# Patient Record
Sex: FEMALE | Race: BLACK OR AFRICAN AMERICAN | NOT HISPANIC OR LATINO | Employment: FULL TIME | URBAN - METROPOLITAN AREA
[De-identification: names, ages, dates, MRNs, and addresses within clinical notes are randomized per-mention and may not be internally consistent; named-entity substitution may affect disease eponyms.]

---

## 2018-06-09 ENCOUNTER — HOSPITAL ENCOUNTER (EMERGENCY)
Facility: OTHER | Age: 28
Discharge: HOME | End: 2018-06-09
Attending: EMERGENCY MEDICINE
Payer: MEDICAID

## 2018-06-09 VITALS
OXYGEN SATURATION: 98 % | HEIGHT: 68 IN | HEART RATE: 58 BPM | WEIGHT: 178 LBS | DIASTOLIC BLOOD PRESSURE: 75 MMHG | BODY MASS INDEX: 26.98 KG/M2 | TEMPERATURE: 98 F | SYSTOLIC BLOOD PRESSURE: 118 MMHG | RESPIRATION RATE: 18 BRPM

## 2018-06-09 DIAGNOSIS — T78.40XA ALLERGIC REACTION, INITIAL ENCOUNTER: Primary | ICD-10-CM

## 2018-06-09 LAB
B-HCG UR QL: NEGATIVE
CTP QC/QA: YES

## 2018-06-09 PROCEDURE — 63600175 PHARM REV CODE 636 W HCPCS: Performed by: PHYSICIAN ASSISTANT

## 2018-06-09 PROCEDURE — 96374 THER/PROPH/DIAG INJ IV PUSH: CPT

## 2018-06-09 PROCEDURE — 81025 URINE PREGNANCY TEST: CPT | Performed by: EMERGENCY MEDICINE

## 2018-06-09 PROCEDURE — 25000003 PHARM REV CODE 250: Performed by: PHYSICIAN ASSISTANT

## 2018-06-09 PROCEDURE — 99284 EMERGENCY DEPT VISIT MOD MDM: CPT | Mod: 25

## 2018-06-09 PROCEDURE — S0028 INJECTION, FAMOTIDINE, 20 MG: HCPCS | Performed by: PHYSICIAN ASSISTANT

## 2018-06-09 PROCEDURE — 96375 TX/PRO/DX INJ NEW DRUG ADDON: CPT

## 2018-06-09 RX ORDER — PREDNISONE 20 MG/1
60 TABLET ORAL DAILY
Qty: 15 TABLET | Refills: 0 | Status: SHIPPED | OUTPATIENT
Start: 2018-06-09 | End: 2018-06-14

## 2018-06-09 RX ORDER — FAMOTIDINE 20 MG/1
20 TABLET, FILM COATED ORAL 2 TIMES DAILY PRN
Qty: 20 TABLET | Refills: 0 | Status: ON HOLD | OUTPATIENT
Start: 2018-06-09 | End: 2022-02-05 | Stop reason: CLARIF

## 2018-06-09 RX ORDER — FAMOTIDINE 10 MG/ML
20 INJECTION INTRAVENOUS
Status: COMPLETED | OUTPATIENT
Start: 2018-06-09 | End: 2018-06-09

## 2018-06-09 RX ORDER — DIPHENHYDRAMINE HCL 25 MG
50 CAPSULE ORAL EVERY 6 HOURS PRN
Qty: 20 CAPSULE | Refills: 0 | Status: SHIPPED | OUTPATIENT
Start: 2018-06-09 | End: 2021-07-07

## 2018-06-09 RX ORDER — DIPHENHYDRAMINE HYDROCHLORIDE 50 MG/ML
25 INJECTION INTRAMUSCULAR; INTRAVENOUS
Status: COMPLETED | OUTPATIENT
Start: 2018-06-09 | End: 2018-06-09

## 2018-06-09 RX ORDER — METHYLPREDNISOLONE SOD SUCC 125 MG
125 VIAL (EA) INJECTION
Status: COMPLETED | OUTPATIENT
Start: 2018-06-09 | End: 2018-06-09

## 2018-06-09 RX ADMIN — METHYLPREDNISOLONE SODIUM SUCCINATE 125 MG: 125 INJECTION, POWDER, FOR SOLUTION INTRAMUSCULAR; INTRAVENOUS at 06:06

## 2018-06-09 RX ADMIN — DIPHENHYDRAMINE HYDROCHLORIDE 25 MG: 50 INJECTION, SOLUTION INTRAMUSCULAR; INTRAVENOUS at 06:06

## 2018-06-09 RX ADMIN — FAMOTIDINE 20 MG: 10 INJECTION INTRAVENOUS at 06:06

## 2018-06-10 NOTE — ED PROVIDER NOTES
Encounter Date: 6/9/2018       History     Chief Complaint   Patient presents with    Insect Bite     Pt reports being stung by a bee to the right wrist. Pt now c/o itching to entire body.      Patient is a 28-year-old female with no significant medical history who presents to the emergency department with insect bite.  Patient states she was at a party just prior to arrival.  She states either a bee or wasp stung her.  She states the 1st time she has ever been stung.  She states within 30 min she had swelling and hives to her face, arms, chest and legs.  She reports extreme pruritus.  She denies oral cavity swelling.  She denies nausea or vomiting. She denies chest tightness or wheezing.      The history is provided by the patient.     Review of patient's allergies indicates:  No Known Allergies  No past medical history on file.  Past Surgical History:   Procedure Laterality Date    EYE SURGERY       No family history on file.  Social History   Substance Use Topics    Smoking status: Never Smoker    Smokeless tobacco: Not on file    Alcohol use No     Review of Systems   Constitutional: Negative for activity change, appetite change, chills, fatigue and fever.   HENT: Negative for congestion, ear discharge, ear pain, nosebleeds, postnasal drip, rhinorrhea, sore throat and trouble swallowing.    Respiratory: Negative for cough, chest tightness, shortness of breath and wheezing.    Cardiovascular: Negative for chest pain.   Gastrointestinal: Negative for abdominal pain, blood in stool, constipation, diarrhea, nausea and vomiting.   Genitourinary: Negative for dysuria, flank pain and hematuria.   Musculoskeletal: Negative for back pain, neck pain and neck stiffness.   Skin: Positive for rash. Negative for wound.   Neurological: Negative for dizziness, syncope, speech difficulty, weakness, light-headedness, numbness and headaches.       Physical Exam     Initial Vitals [06/09/18 1806]   BP Pulse Resp Temp SpO2    115/65 104 18 98.2 °F (36.8 °C) 98 %      MAP       81.67         Physical Exam    Nursing note and vitals reviewed.  Constitutional: She appears well-developed and well-nourished. She is not diaphoretic.  Non-toxic appearance. No distress.   HENT:   Head: Normocephalic.   Right Ear: Hearing and external ear normal.   Left Ear: Hearing and external ear normal.   Nose: Nose normal.   Mouth/Throat: Uvula is midline, oropharynx is clear and moist and mucous membranes are normal. Mucous membranes are not pale and not dry. No trismus in the jaw. No uvula swelling. No oropharyngeal exudate.   Eyes: Conjunctivae are normal. Pupils are equal, round, and reactive to light.   Neck: Normal range of motion.   Cardiovascular: Normal rate, regular rhythm and normal heart sounds. Exam reveals no gallop and no friction rub.    No murmur heard.  Pulmonary/Chest: Breath sounds normal. No respiratory distress. She has no wheezes. She has no rhonchi. She has no rales. She exhibits no tenderness.   Abdominal: Soft. Bowel sounds are normal. There is no tenderness.   Lymphadenopathy:     She has no cervical adenopathy.   Neurological: She is alert and oriented to person, place, and time.   Skin: Skin is warm and dry. Capillary refill takes less than 2 seconds.   Diffuse urticarial lesions to face, trunk, and upper extremities.   Psychiatric: She has a normal mood and affect.         ED Course   Procedures  Labs Reviewed   POCT URINE PREGNANCY          No orders to display        Medical Decision Making:   Initial Assessment:   Urgent evaluation of a 28-year-old female with no significant medical history who presents to the emergency department with insect sting.  Patient is afebrile, nontoxic appearing, and hemodynamically stable. Patient is slightly tachycardic on initial exam.  Lungs are clear to auscultation. Not hypoxic.  No oral cavity swelling.  Diffuse erythematous urticarial lesions to face, trunk, and extremities. No other  systemic symptoms.  Patient will be given Solu-Medrol, Pepcid, and Benadryl.  Patient will be observed and re-evaluated.  ED Management:  8:04 PM  On re-evaluation, patient no longer has hives.  She is feeling much better.  She is ready for discharge. She is advised to follow up with PCP or return to the emergency department with any worsening symptoms or concerns.  Other:   I have discussed this case with another health care provider.       <> Summary of the Discussion: Dr. Allen                      Clinical Impression:   The encounter diagnosis was Allergic reaction, initial encounter.                             Sherlyn Seo PA-C  06/09/18 2007

## 2019-03-18 ENCOUNTER — HOSPITAL ENCOUNTER (EMERGENCY)
Facility: HOSPITAL | Age: 29
Discharge: HOME | End: 2019-03-18
Attending: EMERGENCY MEDICINE

## 2019-03-18 VITALS
RESPIRATION RATE: 12 BRPM | SYSTOLIC BLOOD PRESSURE: 119 MMHG | WEIGHT: 178 LBS | DIASTOLIC BLOOD PRESSURE: 65 MMHG | TEMPERATURE: 99 F | BODY MASS INDEX: 26.36 KG/M2 | OXYGEN SATURATION: 99 % | HEART RATE: 79 BPM | HEIGHT: 69 IN

## 2019-03-18 DIAGNOSIS — N93.9 VAGINAL BLEEDING: Primary | ICD-10-CM

## 2019-03-18 LAB
B-HCG UR QL: NEGATIVE
BACTERIA GENITAL QL WET PREP: ABNORMAL
CLUE CELLS VAG QL WET PREP: ABNORMAL
CTP QC/QA: YES
FILAMENT FUNGI VAG WET PREP-#/AREA: ABNORMAL
SPECIMEN SOURCE: ABNORMAL
T VAGINALIS GENITAL QL WET PREP: ABNORMAL
WBC #/AREA VAG WET PREP: ABNORMAL
YEAST GENITAL QL WET PREP: ABNORMAL

## 2019-03-18 PROCEDURE — 81025 URINE PREGNANCY TEST: CPT | Performed by: PHYSICIAN ASSISTANT

## 2019-03-18 PROCEDURE — 87210 SMEAR WET MOUNT SALINE/INK: CPT

## 2019-03-18 PROCEDURE — 87491 CHLMYD TRACH DNA AMP PROBE: CPT

## 2019-03-18 PROCEDURE — 99284 EMERGENCY DEPT VISIT MOD MDM: CPT | Mod: 25

## 2019-03-18 NOTE — DISCHARGE INSTRUCTIONS
Follow up with your GYN.  For worsening symptoms, chest pain, shortness of breath, increased abdominal pain, high grade fever, stroke or stroke like symptoms, immediately go to the nearest Emergency Room or call 911 as soon as possible.

## 2019-03-18 NOTE — ED PROVIDER NOTES
"Encounter Date: 3/18/2019    SCRIBE #1 NOTE: I, Gay Caicedo, am scribing for, and in the presence of, Christine Jenkins PA-C.       History     Chief Complaint   Patient presents with    Vaginal Bleeding       Time seen by provider: 1:22 PM on 2019    Javier Anderson is a 28 y.o. female with a A1 who presents to the ED with a sudden onset of constant vaginal bleeding and abdominal cramps that began one day ago. Pt notes that she has not had a menstrual cycle in two months. Her last known menstrual cycle was 2019. She took a home pregnancy test, which had a "faint" lines. In the month of February, pt notes she was nauseated and vomiting. She is no longer nauseated or vomiting. The patient denies any other symptoms at this time. No pertinent PSHx noted. No pertinent SHx noted. No known drug allergies noted.       The history is provided by the patient.     Review of patient's allergies indicates:  No Known Allergies  History reviewed. No pertinent past medical history.  Past Surgical History:   Procedure Laterality Date    EYE SURGERY       History reviewed. No pertinent family history.  Social History     Tobacco Use    Smoking status: Never Smoker   Substance Use Topics    Alcohol use: No    Drug use: No     Review of Systems   Constitutional: Negative for activity change, appetite change, chills and fever.   HENT: Negative for congestion, rhinorrhea and sore throat.    Eyes: Negative for redness and visual disturbance.   Respiratory: Negative for cough, chest tightness and shortness of breath.    Cardiovascular: Negative for chest pain.   Gastrointestinal: Positive for abdominal pain, nausea (Resolved) and vomiting (Resolved). Negative for diarrhea.   Genitourinary: Positive for vaginal bleeding. Negative for dysuria and frequency.   Musculoskeletal: Negative for back pain, neck pain and neck stiffness.   Skin: Negative for rash.   Neurological: Negative for dizziness, syncope, numbness " and headaches.       Physical Exam     Initial Vitals [03/18/19 1309]   BP Pulse Resp Temp SpO2   119/65 79 12 98.6 °F (37 °C) 99 %      MAP       --         Physical Exam    Nursing note and vitals reviewed.  Constitutional: She appears well-developed and well-nourished. She is cooperative.  Non-toxic appearance. She does not have a sickly appearance.   HENT:   Head: Normocephalic and atraumatic.   Right Ear: External ear normal.   Left Ear: External ear normal.   Nose: Nose normal.   Eyes: Conjunctivae and lids are normal. Pupils are equal, round, and reactive to light.   Neck: Normal range of motion and full passive range of motion without pain. Neck supple.   Cardiovascular: Normal rate, regular rhythm and normal heart sounds. Exam reveals no gallop and no friction rub.    No murmur heard.  Pulmonary/Chest: Breath sounds normal. She has no wheezes. She has no rhonchi. She has no rales.   Abdominal: Soft. Normal appearance. There is no tenderness. There is no rigidity, no rebound and no guarding.   Genitourinary: Cervix exhibits no motion tenderness and no discharge. Right adnexum displays no tenderness. Left adnexum displays no tenderness. No vaginal discharge found.   Genitourinary Comments: Chaperone present. Scant amount of old blood in the vaginal vault.    Neurological: She is alert.   Skin: Skin is warm, dry and intact. No rash noted.         ED Course   Procedures  Labs Reviewed   VAGINAL SCREEN - Abnormal; Notable for the following components:       Result Value    WBC - Vaginal Screen Occasional (*)     Bacteria - Vaginal Screen Few (*)     All other components within normal limits   C. TRACHOMATIS/N. GONORRHOEAE BY AMP DNA   POCT URINE PREGNANCY          Imaging Results    None          Medical Decision Making:   History:   Old Medical Records: I decided to obtain old medical records.  Clinical Tests:   Lab Tests: Ordered and Reviewed       APC / Resident Notes:   Urgent evaluation of a well-appearing  28-year-old female who presents with vaginal bleeding.  She reports she has not had a period in 2 months and took a pregnancy test at home last month.  She reports she thought she saw a faint line.  Abdomen is soft and nontender. No pelvic tenderness to palpation. She denies discharge. She denies any concerns for STDs.  UPT is negative.   exam shows a scant amount of old blood in the vaginal vault.  No discharge.No cervical motion tenderness. I doubt PID.  No adnexal tenderness.  Recommend follow-up with Gynecology.  Return precautions given.  Case discussed with Dr. Melo.       Scribe Attestation:   Scribe #1: I performed the above scribed service and the documentation accurately describes the services I performed. I attest to the accuracy of the note.    I, Christine Jenkins PA-C, personally performed the services described in this documentation. All medical record entries made by the scribe were at my direction and in my presence.  I have reviewed the chart and agree that the record reflects my personal performance and is accurate and complete. Christine Jenkins PA-C.  2:47 PM 03/18/2019             Clinical Impression:       ICD-10-CM ICD-9-CM   1. Vaginal bleeding N93.9 623.8         Disposition:   Disposition: Discharged  Condition: Stable                        Christine Jenkins PA-C  03/18/19 1447

## 2019-03-19 LAB
C TRACH DNA SPEC QL NAA+PROBE: DETECTED
N GONORRHOEA DNA SPEC QL NAA+PROBE: NOT DETECTED

## 2019-07-06 ENCOUNTER — HOSPITAL ENCOUNTER (EMERGENCY)
Facility: HOSPITAL | Age: 29
Discharge: HOME | End: 2019-07-06
Attending: EMERGENCY MEDICINE

## 2019-07-06 VITALS
WEIGHT: 168 LBS | OXYGEN SATURATION: 100 % | DIASTOLIC BLOOD PRESSURE: 62 MMHG | HEIGHT: 68 IN | RESPIRATION RATE: 16 BRPM | TEMPERATURE: 98 F | BODY MASS INDEX: 25.46 KG/M2 | SYSTOLIC BLOOD PRESSURE: 101 MMHG | HEART RATE: 80 BPM

## 2019-07-06 DIAGNOSIS — A08.4 VIRAL GASTROENTERITIS: Primary | ICD-10-CM

## 2019-07-06 LAB
ALBUMIN SERPL BCP-MCNC: 3.7 G/DL (ref 3.5–5.2)
ALP SERPL-CCNC: 48 U/L (ref 55–135)
ALT SERPL W/O P-5'-P-CCNC: 11 U/L (ref 10–44)
ANION GAP SERPL CALC-SCNC: 7 MMOL/L (ref 8–16)
AST SERPL-CCNC: 19 U/L (ref 10–40)
B-HCG UR QL: NEGATIVE
BACTERIA #/AREA URNS HPF: ABNORMAL /HPF
BASOPHILS # BLD AUTO: 0.01 K/UL (ref 0–0.2)
BASOPHILS NFR BLD: 0.1 % (ref 0–1.9)
BILIRUB SERPL-MCNC: 1.2 MG/DL (ref 0.1–1)
BILIRUB UR QL STRIP: NEGATIVE
BUN SERPL-MCNC: 9 MG/DL (ref 6–20)
CALCIUM SERPL-MCNC: 9.5 MG/DL (ref 8.7–10.5)
CHLORIDE SERPL-SCNC: 103 MMOL/L (ref 95–110)
CLARITY UR: CLEAR
CO2 SERPL-SCNC: 26 MMOL/L (ref 23–29)
COLOR UR: YELLOW
CREAT SERPL-MCNC: 0.9 MG/DL (ref 0.5–1.4)
CTP QC/QA: YES
DIFFERENTIAL METHOD: ABNORMAL
EOSINOPHIL # BLD AUTO: 0 K/UL (ref 0–0.5)
EOSINOPHIL NFR BLD: 0 % (ref 0–8)
ERYTHROCYTE [DISTWIDTH] IN BLOOD BY AUTOMATED COUNT: 13.1 % (ref 11.5–14.5)
EST. GFR  (AFRICAN AMERICAN): >60 ML/MIN/1.73 M^2
EST. GFR  (NON AFRICAN AMERICAN): >60 ML/MIN/1.73 M^2
GLUCOSE SERPL-MCNC: 132 MG/DL (ref 70–110)
GLUCOSE UR QL STRIP: NEGATIVE
HCT VFR BLD AUTO: 36 % (ref 37–48.5)
HGB BLD-MCNC: 11.8 G/DL (ref 12–16)
HGB UR QL STRIP: ABNORMAL
IMM GRANULOCYTES # BLD AUTO: 0.02 K/UL (ref 0–0.04)
KETONES UR QL STRIP: NEGATIVE
LEUKOCYTE ESTERASE UR QL STRIP: NEGATIVE
LIPASE SERPL-CCNC: 8 U/L (ref 4–60)
LYMPHOCYTES # BLD AUTO: 0.7 K/UL (ref 1–4.8)
LYMPHOCYTES NFR BLD: 7.5 % (ref 18–48)
MCH RBC QN AUTO: 30.2 PG (ref 27–31)
MCHC RBC AUTO-ENTMCNC: 32.8 G/DL (ref 32–36)
MCV RBC AUTO: 92 FL (ref 82–98)
MICROSCOPIC COMMENT: ABNORMAL
MONOCYTES # BLD AUTO: 0.4 K/UL (ref 0.3–1)
MONOCYTES NFR BLD: 4.5 % (ref 4–15)
NEUTROPHILS # BLD AUTO: 7.7 K/UL (ref 1.8–7.7)
NEUTROPHILS NFR BLD: 87.7 % (ref 38–73)
NITRITE UR QL STRIP: POSITIVE
NRBC BLD-RTO: 0 /100 WBC
PH UR STRIP: 6 [PH] (ref 5–8)
PLATELET # BLD AUTO: 265 K/UL (ref 150–350)
PMV BLD AUTO: 9.7 FL (ref 9.2–12.9)
POTASSIUM SERPL-SCNC: 3.7 MMOL/L (ref 3.5–5.1)
PROT SERPL-MCNC: 7.3 G/DL (ref 6–8.4)
PROT UR QL STRIP: NEGATIVE
RBC # BLD AUTO: 3.91 M/UL (ref 4–5.4)
RBC #/AREA URNS HPF: 5 /HPF (ref 0–4)
SODIUM SERPL-SCNC: 136 MMOL/L (ref 136–145)
SP GR UR STRIP: 1.02 (ref 1–1.03)
SQUAMOUS #/AREA URNS HPF: 15 /HPF
URN SPEC COLLECT METH UR: ABNORMAL
UROBILINOGEN UR STRIP-ACNC: NEGATIVE EU/DL
WBC # BLD AUTO: 8.75 K/UL (ref 3.9–12.7)

## 2019-07-06 PROCEDURE — 63600175 PHARM REV CODE 636 W HCPCS: Performed by: PHYSICIAN ASSISTANT

## 2019-07-06 PROCEDURE — 83690 ASSAY OF LIPASE: CPT

## 2019-07-06 PROCEDURE — 87077 CULTURE AEROBIC IDENTIFY: CPT

## 2019-07-06 PROCEDURE — 81000 URINALYSIS NONAUTO W/SCOPE: CPT

## 2019-07-06 PROCEDURE — 81025 URINE PREGNANCY TEST: CPT | Performed by: PHYSICIAN ASSISTANT

## 2019-07-06 PROCEDURE — 87088 URINE BACTERIA CULTURE: CPT

## 2019-07-06 PROCEDURE — 80053 COMPREHEN METABOLIC PANEL: CPT

## 2019-07-06 PROCEDURE — 99284 EMERGENCY DEPT VISIT MOD MDM: CPT | Mod: 25

## 2019-07-06 PROCEDURE — 96374 THER/PROPH/DIAG INJ IV PUSH: CPT

## 2019-07-06 PROCEDURE — 96361 HYDRATE IV INFUSION ADD-ON: CPT

## 2019-07-06 PROCEDURE — 36415 COLL VENOUS BLD VENIPUNCTURE: CPT

## 2019-07-06 PROCEDURE — 85025 COMPLETE CBC W/AUTO DIFF WBC: CPT

## 2019-07-06 PROCEDURE — 25000003 PHARM REV CODE 250: Performed by: PHYSICIAN ASSISTANT

## 2019-07-06 PROCEDURE — 87186 SC STD MICRODIL/AGAR DIL: CPT

## 2019-07-06 PROCEDURE — 87086 URINE CULTURE/COLONY COUNT: CPT

## 2019-07-06 RX ORDER — ONDANSETRON 4 MG/1
4 TABLET, FILM COATED ORAL EVERY 6 HOURS PRN
Qty: 20 TABLET | Refills: 0 | Status: SHIPPED | OUTPATIENT
Start: 2019-07-06 | End: 2021-07-14

## 2019-07-06 RX ORDER — KETOROLAC TROMETHAMINE 30 MG/ML
15 INJECTION, SOLUTION INTRAMUSCULAR; INTRAVENOUS
Status: COMPLETED | OUTPATIENT
Start: 2019-07-06 | End: 2019-07-06

## 2019-07-06 RX ORDER — DICYCLOMINE HYDROCHLORIDE 20 MG/1
20 TABLET ORAL EVERY 8 HOURS PRN
Qty: 15 TABLET | Refills: 0 | Status: SHIPPED | OUTPATIENT
Start: 2019-07-06 | End: 2019-07-11

## 2019-07-06 RX ADMIN — SODIUM CHLORIDE 1000 ML: 0.9 INJECTION, SOLUTION INTRAVENOUS at 09:07

## 2019-07-06 RX ADMIN — KETOROLAC TROMETHAMINE 15 MG: 30 INJECTION, SOLUTION INTRAMUSCULAR at 09:07

## 2019-07-06 NOTE — DISCHARGE INSTRUCTIONS
Drink plenty of fluids. Eat a bland diet.  Follow up with your primary care provider.  For worsening symptoms, chest pain, shortness of breath, increased abdominal pain, high grade fever, stroke or stroke like symptoms, immediately go to the nearest Emergency Room or call 911 as soon as possible.

## 2019-07-06 NOTE — ED PROVIDER NOTES
Encounter Date: 7/6/2019    SCRIBE #1 NOTE: I, Nury Luisflor, am scribing for, and in the presence of,  Christine Jenkins PA-C. I have scribed the entire note.       History     Chief Complaint   Patient presents with    Abdominal Pain     started yesterday     Diarrhea    Weakness     29 year old female with no self-reported PMHx presents to the ED with sharp abdominal pain since yesterday. Patient endorses diarrhea (5x), increased urinary frequency, nausea, and vomiting that has mitigated since last night. She states the nausea has resolved. Patient states that she ate seafood on July 4th, but it was not raw. She denies contact with other sick individuals, recent travel, and recent antibiotic use. Patient also denies any dysuria and fever. No known drug allergies.     The history is provided by the patient.     Review of patient's allergies indicates:  No Known Allergies  No past medical history on file.  Past Surgical History:   Procedure Laterality Date    EYE SURGERY       No family history on file.  Social History     Tobacco Use    Smoking status: Never Smoker   Substance Use Topics    Alcohol use: No    Drug use: No     Review of Systems   Constitutional: Negative for activity change, appetite change, chills and fever.   HENT: Negative for congestion, rhinorrhea and sore throat.    Eyes: Negative for redness and visual disturbance.   Respiratory: Negative for cough, chest tightness and shortness of breath.    Cardiovascular: Negative for chest pain.   Gastrointestinal: Positive for abdominal pain, diarrhea, nausea, rectal pain and vomiting.   Genitourinary: Positive for frequency. Negative for dysuria.   Musculoskeletal: Negative for back pain, neck pain and neck stiffness.   Skin: Negative for rash.   Neurological: Negative for dizziness, syncope, numbness and headaches.       Physical Exam     Initial Vitals [07/06/19 0917]   BP Pulse Resp Temp SpO2   (!) 99/57 (!) 117 16 98 °F (36.7 °C) 97 %       MAP       --         Physical Exam    Nursing note and vitals reviewed.  Constitutional: She appears well-developed and well-nourished. She is cooperative.  Non-toxic appearance. She does not have a sickly appearance.   HENT:   Head: Normocephalic and atraumatic.   Right Ear: External ear normal.   Left Ear: External ear normal.   Nose: Nose normal.   Eyes: Conjunctivae and lids are normal.   Neck: Normal range of motion and full passive range of motion without pain. Neck supple.   Cardiovascular: Normal rate, regular rhythm and normal heart sounds. Exam reveals no gallop and no friction rub.    No murmur heard.  Pulmonary/Chest: Breath sounds normal. She has no wheezes. She has no rhonchi. She has no rales.   Abdominal: Soft. Normal appearance. There is no tenderness. There is no rigidity, no rebound and no guarding.   Neurological: She is alert.   Skin: Skin is warm, dry and intact. No rash noted.         ED Course   Procedures  Labs Reviewed   CBC W/ AUTO DIFFERENTIAL - Abnormal; Notable for the following components:       Result Value    RBC 3.91 (*)     Hemoglobin 11.8 (*)     Hematocrit 36.0 (*)     Lymph # 0.7 (*)     Gran% 87.7 (*)     Lymph% 7.5 (*)     All other components within normal limits   COMPREHENSIVE METABOLIC PANEL - Abnormal; Notable for the following components:    Glucose 132 (*)     Total Bilirubin 1.2 (*)     Alkaline Phosphatase 48 (*)     Anion Gap 7 (*)     All other components within normal limits   URINALYSIS, REFLEX TO URINE CULTURE - Abnormal; Notable for the following components:    Occult Blood UA 2+ (*)     Nitrite, UA Positive (*)     All other components within normal limits    Narrative:     Preferred Collection Type->Urine, Clean Catch   URINALYSIS MICROSCOPIC - Abnormal; Notable for the following components:    RBC, UA 5 (*)     Bacteria Many (*)     All other components within normal limits    Narrative:     Preferred Collection Type->Urine, Clean Catch   CULTURE, URINE    LIPASE   POCT URINE PREGNANCY          Imaging Results    None          Medical Decision Making:   History:   Old Medical Records: I decided to obtain old medical records.  Clinical Tests:   Lab Tests: Ordered and Reviewed       APC / Resident Notes:   Urgent evaluation of a 29-year-old female who presents with nausea vomiting and abdominal pain.  She is well appearing.  Vital signs are stable. Abdomen is soft and nontender.  There is no rebound or guarding. She states the nausea and vomiting has resolved but she has persistent diarrhea.  She denies any blood or mucus her stools.  No recent travel, antibiotic use or raw seafood intake.  Labs are unremarkable.  UA shows many bacteria and nitrite positive but no WBCs.  Urine culture was sent.  She was given IV fluids and Toradol in the ER with resolution of her symptoms. Suspect a viral gastroenteritis.  Discussed good oral hydration, bland diet in strict return precautions. Discussed results with patient. Return precautions given. Based on my clinical evaluation, I do not appreciate any immediate, emergent, or life threatening condition or etiology that warrants additional workup today and feel that the patient can be discharged with close follow up care.  Patient is to follow up with their primary care provider. Case was discussed with Dr. Corcoran who is in agreement with the plan of care. All questions answered.          Scribe Attestation:   Scribe #1: I performed the above scribed service and the documentation accurately describes the services I performed. I attest to the accuracy of the note.    Attending Attestation:     Physician Attestation Statement for NP/PA:   I discussed this assessment and plan of this patient with the NP/PA, but I did not personally examine the patient. The face to face encounter was performed by the NP/PA.    Other NP/PA Attestation Additions:    History of Present Illness: 29-year-old female presented with a chief complaint of  abdominal pain.    Medical Decision Making: Initial differential diagnosis included but not limited to enteritis, viral illness, and pancreatitis.  I am in agreement with the physician assistant's  assessment, treatment, and plan of care.         I, Christine Jenkins PA-C, personally performed the services described in this documentation. All medical record entries made by the scribe were at my direction and in my presence.  I have reviewed the chart and agree that the record reflects my personal performance and is accurate and complete. Christine Jenkins PA-C.  11:35 AM 07/06/2019             Clinical Impression:       ICD-10-CM ICD-9-CM   1. Viral gastroenteritis A08.4 008.8         Disposition:   Disposition: Discharged  Condition: Stable                        Christine Jenkins PA-C  07/06/19 1136       Néstor Corcoran MD  07/06/19 1140

## 2019-07-06 NOTE — ED NOTES
Pt ready for discharge per PA. Pt is AAOx4. Ambulatory with steady gait unassisted. Respirations even, nonlabored. Pt has had no episodes of vomiting or diarrhea while in ED.

## 2019-07-06 NOTE — ED NOTES
Pt presents to ED POV from home with c/o abdominal pain, nausea, vomiting, and diarrhea since yesterday. Pt is AAOx4. Skin warm, dry to touch. Respirations even, nonlabored. NAD noted.

## 2019-07-08 LAB — BACTERIA UR CULT: ABNORMAL

## 2019-07-09 ENCOUNTER — HOSPITAL ENCOUNTER (EMERGENCY)
Facility: HOSPITAL | Age: 29
Discharge: HOME | End: 2019-07-10
Attending: EMERGENCY MEDICINE

## 2019-07-09 DIAGNOSIS — N30.90 CYSTITIS: Primary | ICD-10-CM

## 2019-07-09 DIAGNOSIS — K52.9 COLITIS: ICD-10-CM

## 2019-07-09 LAB
ALBUMIN SERPL BCP-MCNC: 3.5 G/DL (ref 3.5–5.2)
ALP SERPL-CCNC: 38 U/L (ref 55–135)
ALT SERPL W/O P-5'-P-CCNC: 14 U/L (ref 10–44)
ANION GAP SERPL CALC-SCNC: 14 MMOL/L (ref 8–16)
AST SERPL-CCNC: 18 U/L (ref 10–40)
B-HCG UR QL: NEGATIVE
BACTERIA #/AREA URNS HPF: ABNORMAL /HPF
BASOPHILS # BLD AUTO: 0.01 K/UL (ref 0–0.2)
BASOPHILS NFR BLD: 0.3 % (ref 0–1.9)
BILIRUB SERPL-MCNC: 0.6 MG/DL (ref 0.1–1)
BILIRUB UR QL STRIP: NEGATIVE
BUN SERPL-MCNC: 10 MG/DL (ref 6–20)
CALCIUM SERPL-MCNC: 9.6 MG/DL (ref 8.7–10.5)
CHLORIDE SERPL-SCNC: 96 MMOL/L (ref 95–110)
CLARITY UR: ABNORMAL
CO2 SERPL-SCNC: 25 MMOL/L (ref 23–29)
COLOR UR: YELLOW
CREAT SERPL-MCNC: 0.9 MG/DL (ref 0.5–1.4)
CTP QC/QA: YES
DIFFERENTIAL METHOD: ABNORMAL
EOSINOPHIL # BLD AUTO: 0 K/UL (ref 0–0.5)
EOSINOPHIL NFR BLD: 0.3 % (ref 0–8)
ERYTHROCYTE [DISTWIDTH] IN BLOOD BY AUTOMATED COUNT: 12.7 % (ref 11.5–14.5)
EST. GFR  (AFRICAN AMERICAN): >60 ML/MIN/1.73 M^2
EST. GFR  (NON AFRICAN AMERICAN): >60 ML/MIN/1.73 M^2
GLUCOSE SERPL-MCNC: 95 MG/DL (ref 70–110)
GLUCOSE UR QL STRIP: NEGATIVE
HCT VFR BLD AUTO: 37 % (ref 37–48.5)
HGB BLD-MCNC: 12.2 G/DL (ref 12–16)
HGB UR QL STRIP: ABNORMAL
IMM GRANULOCYTES # BLD AUTO: 0.03 K/UL (ref 0–0.04)
KETONES UR QL STRIP: ABNORMAL
LEUKOCYTE ESTERASE UR QL STRIP: ABNORMAL
LIPASE SERPL-CCNC: 35 U/L (ref 4–60)
LYMPHOCYTES # BLD AUTO: 1.1 K/UL (ref 1–4.8)
LYMPHOCYTES NFR BLD: 29.4 % (ref 18–48)
MCH RBC QN AUTO: 29.3 PG (ref 27–31)
MCHC RBC AUTO-ENTMCNC: 33 G/DL (ref 32–36)
MCV RBC AUTO: 89 FL (ref 82–98)
MICROSCOPIC COMMENT: ABNORMAL
MONOCYTES # BLD AUTO: 0.6 K/UL (ref 0.3–1)
MONOCYTES NFR BLD: 15.6 % (ref 4–15)
NEUTROPHILS # BLD AUTO: 2.1 K/UL (ref 1.8–7.7)
NEUTROPHILS NFR BLD: 53.6 % (ref 38–73)
NITRITE UR QL STRIP: POSITIVE
NRBC BLD-RTO: 0 /100 WBC
PH UR STRIP: 6 [PH] (ref 5–8)
PLATELET # BLD AUTO: 277 K/UL (ref 150–350)
PMV BLD AUTO: 9.7 FL (ref 9.2–12.9)
POTASSIUM SERPL-SCNC: 3.2 MMOL/L (ref 3.5–5.1)
PROT SERPL-MCNC: 7.9 G/DL (ref 6–8.4)
PROT UR QL STRIP: ABNORMAL
RBC # BLD AUTO: 4.16 M/UL (ref 4–5.4)
RBC #/AREA URNS HPF: 8 /HPF (ref 0–4)
SODIUM SERPL-SCNC: 135 MMOL/L (ref 136–145)
SP GR UR STRIP: <=1.005 (ref 1–1.03)
SQUAMOUS #/AREA URNS HPF: 12 /HPF
URN SPEC COLLECT METH UR: ABNORMAL
UROBILINOGEN UR STRIP-ACNC: NEGATIVE EU/DL
WBC # BLD AUTO: 3.85 K/UL (ref 3.9–12.7)
WBC #/AREA URNS HPF: 16 /HPF (ref 0–5)

## 2019-07-09 PROCEDURE — 99285 EMERGENCY DEPT VISIT HI MDM: CPT | Mod: 25

## 2019-07-09 PROCEDURE — 25500020 PHARM REV CODE 255: Performed by: EMERGENCY MEDICINE

## 2019-07-09 PROCEDURE — 83690 ASSAY OF LIPASE: CPT

## 2019-07-09 PROCEDURE — 85025 COMPLETE CBC W/AUTO DIFF WBC: CPT

## 2019-07-09 PROCEDURE — 87086 URINE CULTURE/COLONY COUNT: CPT

## 2019-07-09 PROCEDURE — 80053 COMPREHEN METABOLIC PANEL: CPT

## 2019-07-09 PROCEDURE — 87077 CULTURE AEROBIC IDENTIFY: CPT

## 2019-07-09 PROCEDURE — 36415 COLL VENOUS BLD VENIPUNCTURE: CPT

## 2019-07-09 PROCEDURE — 96372 THER/PROPH/DIAG INJ SC/IM: CPT | Mod: 59

## 2019-07-09 PROCEDURE — 81000 URINALYSIS NONAUTO W/SCOPE: CPT

## 2019-07-09 PROCEDURE — 87186 SC STD MICRODIL/AGAR DIL: CPT

## 2019-07-09 PROCEDURE — 87088 URINE BACTERIA CULTURE: CPT

## 2019-07-09 PROCEDURE — 81025 URINE PREGNANCY TEST: CPT | Performed by: EMERGENCY MEDICINE

## 2019-07-09 PROCEDURE — 63600175 PHARM REV CODE 636 W HCPCS: Performed by: EMERGENCY MEDICINE

## 2019-07-09 RX ORDER — DICYCLOMINE HYDROCHLORIDE 10 MG/ML
20 INJECTION INTRAMUSCULAR
Status: COMPLETED | OUTPATIENT
Start: 2019-07-09 | End: 2019-07-09

## 2019-07-09 RX ADMIN — IOHEXOL 100 ML: 350 INJECTION, SOLUTION INTRAVENOUS at 10:07

## 2019-07-09 RX ADMIN — CEFTRIAXONE 1 G: 1 INJECTION, SOLUTION INTRAVENOUS at 11:07

## 2019-07-09 RX ADMIN — DICYCLOMINE HYDROCHLORIDE 20 MG: 20 INJECTION, SOLUTION INTRAMUSCULAR at 10:07

## 2019-07-10 VITALS
BODY MASS INDEX: 25.46 KG/M2 | RESPIRATION RATE: 18 BRPM | TEMPERATURE: 98 F | SYSTOLIC BLOOD PRESSURE: 105 MMHG | DIASTOLIC BLOOD PRESSURE: 63 MMHG | HEART RATE: 62 BPM | HEIGHT: 68 IN | WEIGHT: 168 LBS | OXYGEN SATURATION: 98 %

## 2019-07-10 RX ORDER — AMOXICILLIN AND CLAVULANATE POTASSIUM 875; 125 MG/1; MG/1
1 TABLET, FILM COATED ORAL 2 TIMES DAILY
Qty: 14 TABLET | Refills: 0 | Status: SHIPPED | OUTPATIENT
Start: 2019-07-10 | End: 2021-06-20

## 2019-07-10 NOTE — ED PROVIDER NOTES
"Encounter Date: 7/9/2019    SCRIBE #1 NOTE: I, Valarie Lopez, am scribing for, and in the presence of, Néstor Corcoran MD.       History     Chief Complaint   Patient presents with    Abdominal Pain     mid abdominal pain - seen here Saturday - no better        Time seen by provider: 10:19 PM on 07/09/2019    Javier Anderson is a 29 y.o. female who presents to the ED with an onset of lower abdominal pain that began 4 days ago. Per provider, the pt was here in the ED 3 days ago for abdominal pain. She has had persistent Sx. She had a questionable UTI that was not treated. However, the culture was sent. The culture grew e.coli, and two attempts were made to contact the pt. There was no answer. Per pt, she came to the ED, 3 days ago, complaining of lower abdominal pain. She says, "the pain is like a bursting contraction pain."  She says that she was given an Rx and has been compliant to her regimen. However, she says the medication has done nothing to improve her Sx. She endorses diarrhea, increased frequency in urination, and is positive for sick contact (son). The patient denies pain with urination, blood in her urine, or any other symptoms at this time. She has no pertinent PSHx noted. She has no abdominal PMHx noted. No known drug allergies are noted.        The history is provided by the patient.     Review of patient's allergies indicates:  No Known Allergies  No past medical history on file.  Past Surgical History:   Procedure Laterality Date    EYE SURGERY       No family history on file.  Social History     Tobacco Use    Smoking status: Never Smoker   Substance Use Topics    Alcohol use: No    Drug use: No     Review of Systems   Constitutional: Negative for fever.   HENT: Negative for sore throat.    Respiratory: Negative for shortness of breath.    Cardiovascular: Negative for chest pain.   Gastrointestinal: Positive for abdominal pain (lower) and diarrhea. Negative for nausea.   Genitourinary: " Positive for frequency. Negative for dysuria and hematuria.   Musculoskeletal: Negative for back pain.   Skin: Negative for rash.   Neurological: Negative for weakness.   Hematological: Does not bruise/bleed easily.       Physical Exam     Initial Vitals [07/09/19 2108]   BP Pulse Resp Temp SpO2   100/65 73 18 97.5 °F (36.4 °C) 100 %      MAP       --         Physical Exam    Nursing note and vitals reviewed.  Constitutional: She appears well-developed and well-nourished. She is not diaphoretic. No distress.   HENT:   Head: Normocephalic and atraumatic.   Eyes: EOM are normal. Pupils are equal, round, and reactive to light.   Neck: Normal range of motion. Neck supple.   Cardiovascular: Normal rate, regular rhythm, normal heart sounds and intact distal pulses. Exam reveals no gallop and no friction rub.    No murmur heard.  Pulmonary/Chest: Breath sounds normal. No respiratory distress. She has no wheezes. She has no rhonchi. She has no rales.   Abdominal: Soft. Bowel sounds are normal. There is tenderness in the periumbilical area and left lower quadrant. There is no rebound and no guarding.   Musculoskeletal: Normal range of motion.   Neurological: She is alert and oriented to person, place, and time.   Skin: Skin is warm and dry.   Psychiatric: She has a normal mood and affect. Her behavior is normal. Judgment and thought content normal.         ED Course   Procedures  Labs Reviewed   URINALYSIS, REFLEX TO URINE CULTURE - Abnormal; Notable for the following components:       Result Value    Appearance, UA Hazy (*)     Specific Gravity, UA <=1.005 (*)     Protein, UA Trace (*)     Ketones, UA Trace (*)     Occult Blood UA 2+ (*)     Nitrite, UA Positive (*)     Leukocytes, UA 2+ (*)     All other components within normal limits    Narrative:     Preferred Collection Type->Urine, Clean Catch   COMPREHENSIVE METABOLIC PANEL - Abnormal; Notable for the following components:    Sodium 135 (*)     Potassium 3.2 (*)      Alkaline Phosphatase 38 (*)     All other components within normal limits   CBC W/ AUTO DIFFERENTIAL - Abnormal; Notable for the following components:    WBC 3.85 (*)     Mono% 15.6 (*)     All other components within normal limits   URINALYSIS MICROSCOPIC - Abnormal; Notable for the following components:    RBC, UA 8 (*)     WBC, UA 16 (*)     Bacteria Many (*)     All other components within normal limits    Narrative:     Preferred Collection Type->Urine, Clean Catch   CULTURE, URINE   LIPASE   POCT URINE PREGNANCY          Imaging Results          CT Abdomen Pelvis With Contrast (In process)                  Medical Decision Making:   History:   Old Medical Records: I decided to obtain old medical records.  Initial Assessment:   29-year-old female presented with a chief complaint of abdominal pain.  Differential Diagnosis:   My differential diagnosis includes diverticulitis, colitis, enteritis, and cystitis.     Clinical Tests:   Lab Tests: Ordered and Reviewed  Radiological Study: Ordered and Reviewed  ED Management:  The patient was emergently evaluated in the emergency department, her evaluation was significant for a young female with abdominal tenderness.  The patient's labs were significant for an infected urine.  The patient's CT scan was significant for a cystitis and mild nonspecific enterocolitis per [facility].  The patient was treated with a dose of IV Rocephin in the emergency department.  She is stable for discharge to home.  She will be discharged home with p.o. Augmentin and she is referred to primary care for follow-up.            Scribe Attestation:   Scribe #1: I performed the above scribed service and the documentation accurately describes the services I performed. I attest to the accuracy of the note.        I, Dr. Néstor Corcoran, personally performed the services described in this documentation. All medical record entries made by the scribe were at my direction and in my presence.  I  have reviewed the chart and agree that the record reflects my personal performance and is accurate and complete. Néstor Corcoran MD.  1:13 AM 07/10/2019          Clinical Impression:       ICD-10-CM ICD-9-CM   1. Cystitis N30.90 595.9   2. Colitis K52.9 558.9         Disposition:   Disposition: Discharged  Condition: Stable                        Néstor Corcoran MD  07/10/19 0115

## 2019-07-12 LAB — BACTERIA UR CULT: ABNORMAL

## 2021-04-27 ENCOUNTER — PATIENT MESSAGE (OUTPATIENT)
Dept: ADMINISTRATIVE | Facility: OTHER | Age: 31
End: 2021-04-27

## 2021-04-27 ENCOUNTER — NURSE TRIAGE (OUTPATIENT)
Dept: ADMINISTRATIVE | Facility: CLINIC | Age: 31
End: 2021-04-27

## 2021-04-27 ENCOUNTER — HOSPITAL ENCOUNTER (EMERGENCY)
Facility: HOSPITAL | Age: 31
Discharge: HOME | End: 2021-04-27
Attending: EMERGENCY MEDICINE
Payer: MEDICAID

## 2021-04-27 VITALS
BODY MASS INDEX: 28.38 KG/M2 | HEART RATE: 85 BPM | DIASTOLIC BLOOD PRESSURE: 69 MMHG | OXYGEN SATURATION: 99 % | HEIGHT: 68 IN | SYSTOLIC BLOOD PRESSURE: 113 MMHG | RESPIRATION RATE: 18 BRPM | TEMPERATURE: 103 F | WEIGHT: 187.25 LBS

## 2021-04-27 DIAGNOSIS — U07.1 COVID-19 VIRUS INFECTION: Primary | ICD-10-CM

## 2021-04-27 DIAGNOSIS — B34.9 VIRAL SYNDROME: ICD-10-CM

## 2021-04-27 LAB — SARS-COV-2 RDRP RESP QL NAA+PROBE: POSITIVE

## 2021-04-27 PROCEDURE — 25000003 PHARM REV CODE 250: Performed by: EMERGENCY MEDICINE

## 2021-04-27 PROCEDURE — 99282 EMERGENCY DEPT VISIT SF MDM: CPT

## 2021-04-27 PROCEDURE — U0002 COVID-19 LAB TEST NON-CDC: HCPCS | Performed by: EMERGENCY MEDICINE

## 2021-04-27 RX ORDER — ACETAMINOPHEN 500 MG
1000 TABLET ORAL
Status: COMPLETED | OUTPATIENT
Start: 2021-04-27 | End: 2021-04-27

## 2021-04-27 RX ADMIN — ACETAMINOPHEN 1000 MG: 500 TABLET ORAL at 09:04

## 2021-04-28 ENCOUNTER — HOSPITAL ENCOUNTER (EMERGENCY)
Facility: HOSPITAL | Age: 31
Discharge: HOME | End: 2021-04-28
Attending: EMERGENCY MEDICINE
Payer: MEDICAID

## 2021-04-28 ENCOUNTER — NURSE TRIAGE (OUTPATIENT)
Dept: ADMINISTRATIVE | Facility: CLINIC | Age: 31
End: 2021-04-28

## 2021-04-28 ENCOUNTER — PATIENT MESSAGE (OUTPATIENT)
Dept: ADMINISTRATIVE | Facility: OTHER | Age: 31
End: 2021-04-28

## 2021-04-28 ENCOUNTER — TELEPHONE (OUTPATIENT)
Dept: ADMINISTRATIVE | Facility: CLINIC | Age: 31
End: 2021-04-28

## 2021-04-28 VITALS
RESPIRATION RATE: 18 BRPM | DIASTOLIC BLOOD PRESSURE: 72 MMHG | OXYGEN SATURATION: 98 % | HEART RATE: 85 BPM | WEIGHT: 187 LBS | SYSTOLIC BLOOD PRESSURE: 122 MMHG | BODY MASS INDEX: 28.34 KG/M2 | HEIGHT: 68 IN

## 2021-04-28 DIAGNOSIS — R06.02 SHORTNESS OF BREATH: ICD-10-CM

## 2021-04-28 DIAGNOSIS — R06.02 SOB (SHORTNESS OF BREATH): ICD-10-CM

## 2021-04-28 DIAGNOSIS — U07.1 COVID-19: Primary | ICD-10-CM

## 2021-04-28 DIAGNOSIS — R07.9 CHEST PAIN: ICD-10-CM

## 2021-04-28 LAB
B-HCG UR QL: NEGATIVE
CTP QC/QA: YES

## 2021-04-28 PROCEDURE — 25000003 PHARM REV CODE 250: Performed by: NURSE PRACTITIONER

## 2021-04-28 PROCEDURE — 93010 EKG 12-LEAD: ICD-10-PCS | Mod: ,,, | Performed by: SPECIALIST

## 2021-04-28 PROCEDURE — 93010 ELECTROCARDIOGRAM REPORT: CPT | Mod: ,,, | Performed by: SPECIALIST

## 2021-04-28 PROCEDURE — 93005 ELECTROCARDIOGRAM TRACING: CPT

## 2021-04-28 PROCEDURE — 81025 URINE PREGNANCY TEST: CPT | Performed by: NURSE PRACTITIONER

## 2021-04-28 PROCEDURE — 99285 EMERGENCY DEPT VISIT HI MDM: CPT | Mod: 25

## 2021-04-28 RX ORDER — IBUPROFEN 600 MG/1
600 TABLET ORAL
Status: COMPLETED | OUTPATIENT
Start: 2021-04-28 | End: 2021-04-28

## 2021-04-28 RX ORDER — ALBUTEROL SULFATE 90 UG/1
1-2 AEROSOL, METERED RESPIRATORY (INHALATION) EVERY 6 HOURS PRN
Qty: 6.7 G | Refills: 0 | Status: SHIPPED | OUTPATIENT
Start: 2021-04-28 | End: 2021-05-11 | Stop reason: SDUPTHER

## 2021-04-28 RX ADMIN — IBUPROFEN 600 MG: 600 TABLET, FILM COATED ORAL at 05:04

## 2021-04-29 ENCOUNTER — NURSE TRIAGE (OUTPATIENT)
Dept: ADMINISTRATIVE | Facility: CLINIC | Age: 31
End: 2021-04-29

## 2021-04-29 ENCOUNTER — PATIENT MESSAGE (OUTPATIENT)
Dept: ADMINISTRATIVE | Facility: CLINIC | Age: 31
End: 2021-04-29

## 2021-04-30 ENCOUNTER — NURSE TRIAGE (OUTPATIENT)
Dept: ADMINISTRATIVE | Facility: CLINIC | Age: 31
End: 2021-04-30

## 2021-04-30 ENCOUNTER — PATIENT MESSAGE (OUTPATIENT)
Dept: ADMINISTRATIVE | Facility: CLINIC | Age: 31
End: 2021-04-30

## 2021-04-30 ENCOUNTER — PATIENT OUTREACH (OUTPATIENT)
Dept: EMERGENCY MEDICINE | Facility: HOSPITAL | Age: 31
End: 2021-04-30

## 2021-05-01 ENCOUNTER — PATIENT MESSAGE (OUTPATIENT)
Dept: ADMINISTRATIVE | Facility: CLINIC | Age: 31
End: 2021-05-01

## 2021-05-01 ENCOUNTER — NURSE TRIAGE (OUTPATIENT)
Dept: ADMINISTRATIVE | Facility: CLINIC | Age: 31
End: 2021-05-01

## 2021-05-06 ENCOUNTER — PATIENT MESSAGE (OUTPATIENT)
Dept: RESEARCH | Facility: HOSPITAL | Age: 31
End: 2021-05-06

## 2021-05-11 ENCOUNTER — HOSPITAL ENCOUNTER (EMERGENCY)
Facility: HOSPITAL | Age: 31
Discharge: HOME | End: 2021-05-11
Attending: EMERGENCY MEDICINE
Payer: MEDICAID

## 2021-05-11 VITALS
RESPIRATION RATE: 18 BRPM | HEART RATE: 62 BPM | WEIGHT: 187 LBS | BODY MASS INDEX: 28.34 KG/M2 | TEMPERATURE: 98 F | OXYGEN SATURATION: 100 % | SYSTOLIC BLOOD PRESSURE: 123 MMHG | DIASTOLIC BLOOD PRESSURE: 82 MMHG | HEIGHT: 68 IN

## 2021-05-11 DIAGNOSIS — R05.9 COUGH: ICD-10-CM

## 2021-05-11 DIAGNOSIS — U07.1 COVID-19: Primary | ICD-10-CM

## 2021-05-11 LAB
ANION GAP SERPL CALC-SCNC: 6 MMOL/L (ref 8–16)
B-HCG UR QL: NEGATIVE
BACTERIA #/AREA URNS HPF: ABNORMAL /HPF
BASOPHILS # BLD AUTO: 0.02 K/UL (ref 0–0.2)
BASOPHILS NFR BLD: 0.4 % (ref 0–1.9)
BILIRUB UR QL STRIP: NEGATIVE
BUN SERPL-MCNC: 11 MG/DL (ref 6–20)
CALCIUM SERPL-MCNC: 9.3 MG/DL (ref 8.7–10.5)
CHLORIDE SERPL-SCNC: 105 MMOL/L (ref 95–110)
CLARITY UR: CLEAR
CO2 SERPL-SCNC: 30 MMOL/L (ref 23–29)
COLOR UR: YELLOW
CREAT SERPL-MCNC: 0.7 MG/DL (ref 0.5–1.4)
CTP QC/QA: YES
DIFFERENTIAL METHOD: ABNORMAL
EOSINOPHIL # BLD AUTO: 0.1 K/UL (ref 0–0.5)
EOSINOPHIL NFR BLD: 1.1 % (ref 0–8)
ERYTHROCYTE [DISTWIDTH] IN BLOOD BY AUTOMATED COUNT: 11.8 % (ref 11.5–14.5)
EST. GFR  (AFRICAN AMERICAN): >60 ML/MIN/1.73 M^2
EST. GFR  (NON AFRICAN AMERICAN): >60 ML/MIN/1.73 M^2
GLUCOSE SERPL-MCNC: 89 MG/DL (ref 70–110)
GLUCOSE UR QL STRIP: NEGATIVE
HCT VFR BLD AUTO: 36.3 % (ref 37–48.5)
HGB BLD-MCNC: 11.7 G/DL (ref 12–16)
HGB UR QL STRIP: ABNORMAL
IMM GRANULOCYTES # BLD AUTO: 0.01 K/UL (ref 0–0.04)
IMM GRANULOCYTES NFR BLD AUTO: 0.2 % (ref 0–0.5)
KETONES UR QL STRIP: NEGATIVE
LEUKOCYTE ESTERASE UR QL STRIP: NEGATIVE
LYMPHOCYTES # BLD AUTO: 1.9 K/UL (ref 1–4.8)
LYMPHOCYTES NFR BLD: 34.6 % (ref 18–48)
MCH RBC QN AUTO: 30 PG (ref 27–31)
MCHC RBC AUTO-ENTMCNC: 32.2 G/DL (ref 32–36)
MCV RBC AUTO: 93 FL (ref 82–98)
MICROSCOPIC COMMENT: ABNORMAL
MONOCYTES # BLD AUTO: 0.4 K/UL (ref 0.3–1)
MONOCYTES NFR BLD: 6.5 % (ref 4–15)
NEUTROPHILS # BLD AUTO: 3.2 K/UL (ref 1.8–7.7)
NEUTROPHILS NFR BLD: 57.2 % (ref 38–73)
NITRITE UR QL STRIP: NEGATIVE
NRBC BLD-RTO: 0 /100 WBC
PH UR STRIP: 8 [PH] (ref 5–8)
PLATELET # BLD AUTO: 307 K/UL (ref 150–450)
PMV BLD AUTO: 9.9 FL (ref 9.2–12.9)
POTASSIUM SERPL-SCNC: 3.6 MMOL/L (ref 3.5–5.1)
PROT UR QL STRIP: NEGATIVE
RBC # BLD AUTO: 3.9 M/UL (ref 4–5.4)
RBC #/AREA URNS HPF: 2 /HPF (ref 0–4)
SODIUM SERPL-SCNC: 141 MMOL/L (ref 136–145)
SP GR UR STRIP: 1.02 (ref 1–1.03)
SQUAMOUS #/AREA URNS HPF: 15 /HPF
URN SPEC COLLECT METH UR: ABNORMAL
UROBILINOGEN UR STRIP-ACNC: ABNORMAL EU/DL
WBC # BLD AUTO: 5.55 K/UL (ref 3.9–12.7)
WBC #/AREA URNS HPF: 12 /HPF (ref 0–5)

## 2021-05-11 PROCEDURE — 99284 EMERGENCY DEPT VISIT MOD MDM: CPT | Mod: 25

## 2021-05-11 PROCEDURE — 85025 COMPLETE CBC W/AUTO DIFF WBC: CPT | Performed by: PHYSICIAN ASSISTANT

## 2021-05-11 PROCEDURE — 36415 COLL VENOUS BLD VENIPUNCTURE: CPT | Performed by: PHYSICIAN ASSISTANT

## 2021-05-11 PROCEDURE — 81025 URINE PREGNANCY TEST: CPT | Performed by: PHYSICIAN ASSISTANT

## 2021-05-11 PROCEDURE — 81000 URINALYSIS NONAUTO W/SCOPE: CPT | Performed by: PHYSICIAN ASSISTANT

## 2021-05-11 PROCEDURE — 80048 BASIC METABOLIC PNL TOTAL CA: CPT | Performed by: PHYSICIAN ASSISTANT

## 2021-05-11 RX ORDER — ALBUTEROL SULFATE 90 UG/1
1-2 AEROSOL, METERED RESPIRATORY (INHALATION) EVERY 6 HOURS PRN
Qty: 6.7 G | Refills: 0 | Status: ON HOLD | OUTPATIENT
Start: 2021-05-11 | End: 2022-02-05 | Stop reason: CLARIF

## 2021-06-20 ENCOUNTER — HOSPITAL ENCOUNTER (EMERGENCY)
Facility: HOSPITAL | Age: 31
Discharge: HOME | End: 2021-06-20
Attending: EMERGENCY MEDICINE
Payer: MEDICAID

## 2021-06-20 VITALS
RESPIRATION RATE: 18 BRPM | TEMPERATURE: 98 F | OXYGEN SATURATION: 99 % | HEIGHT: 68 IN | DIASTOLIC BLOOD PRESSURE: 72 MMHG | WEIGHT: 182 LBS | SYSTOLIC BLOOD PRESSURE: 136 MMHG | HEART RATE: 68 BPM | BODY MASS INDEX: 27.58 KG/M2

## 2021-06-20 DIAGNOSIS — N93.9 VAGINAL BLEEDING: Primary | ICD-10-CM

## 2021-06-20 DIAGNOSIS — N39.0 ACUTE UTI: ICD-10-CM

## 2021-06-20 LAB
ABO + RH BLD: NORMAL
ALBUMIN SERPL BCP-MCNC: 3.6 G/DL (ref 3.5–5.2)
ALP SERPL-CCNC: 47 U/L (ref 55–135)
ALT SERPL W/O P-5'-P-CCNC: 10 U/L (ref 10–44)
ANION GAP SERPL CALC-SCNC: 7 MMOL/L (ref 8–16)
AST SERPL-CCNC: 15 U/L (ref 10–40)
B-HCG UR QL: NEGATIVE
BACTERIA #/AREA URNS HPF: ABNORMAL /HPF
BASOPHILS # BLD AUTO: 0.02 K/UL (ref 0–0.2)
BASOPHILS NFR BLD: 0.4 % (ref 0–1.9)
BILIRUB SERPL-MCNC: 0.5 MG/DL (ref 0.1–1)
BILIRUB UR QL STRIP: NEGATIVE
BUN SERPL-MCNC: 9 MG/DL (ref 6–20)
CALCIUM SERPL-MCNC: 9.2 MG/DL (ref 8.7–10.5)
CHLORIDE SERPL-SCNC: 106 MMOL/L (ref 95–110)
CLARITY UR: ABNORMAL
CO2 SERPL-SCNC: 25 MMOL/L (ref 23–29)
COLOR UR: YELLOW
CREAT SERPL-MCNC: 0.6 MG/DL (ref 0.5–1.4)
CTP QC/QA: YES
DIFFERENTIAL METHOD: ABNORMAL
EOSINOPHIL # BLD AUTO: 0.1 K/UL (ref 0–0.5)
EOSINOPHIL NFR BLD: 1.6 % (ref 0–8)
ERYTHROCYTE [DISTWIDTH] IN BLOOD BY AUTOMATED COUNT: 12.9 % (ref 11.5–14.5)
EST. GFR  (AFRICAN AMERICAN): >60 ML/MIN/1.73 M^2
EST. GFR  (NON AFRICAN AMERICAN): >60 ML/MIN/1.73 M^2
GLUCOSE SERPL-MCNC: 79 MG/DL (ref 70–110)
GLUCOSE UR QL STRIP: NEGATIVE
HCG INTACT+B SERPL-ACNC: <1.2 MIU/ML
HCT VFR BLD AUTO: 33.1 % (ref 37–48.5)
HGB BLD-MCNC: 10.6 G/DL (ref 12–16)
HGB UR QL STRIP: ABNORMAL
IMM GRANULOCYTES # BLD AUTO: 0.01 K/UL (ref 0–0.04)
IMM GRANULOCYTES NFR BLD AUTO: 0.2 % (ref 0–0.5)
KETONES UR QL STRIP: NEGATIVE
LEUKOCYTE ESTERASE UR QL STRIP: NEGATIVE
LYMPHOCYTES # BLD AUTO: 2 K/UL (ref 1–4.8)
LYMPHOCYTES NFR BLD: 39.2 % (ref 18–48)
MCH RBC QN AUTO: 30.3 PG (ref 27–31)
MCHC RBC AUTO-ENTMCNC: 32 G/DL (ref 32–36)
MCV RBC AUTO: 95 FL (ref 82–98)
MICROSCOPIC COMMENT: ABNORMAL
MONOCYTES # BLD AUTO: 0.3 K/UL (ref 0.3–1)
MONOCYTES NFR BLD: 6 % (ref 4–15)
NEUTROPHILS # BLD AUTO: 2.7 K/UL (ref 1.8–7.7)
NEUTROPHILS NFR BLD: 52.6 % (ref 38–73)
NITRITE UR QL STRIP: POSITIVE
NRBC BLD-RTO: 0 /100 WBC
PH UR STRIP: 7 [PH] (ref 5–8)
PLATELET # BLD AUTO: 283 K/UL (ref 150–450)
PMV BLD AUTO: 9.3 FL (ref 9.2–12.9)
POTASSIUM SERPL-SCNC: 3.6 MMOL/L (ref 3.5–5.1)
PROT SERPL-MCNC: 7 G/DL (ref 6–8.4)
PROT UR QL STRIP: NEGATIVE
RBC # BLD AUTO: 3.5 M/UL (ref 4–5.4)
RBC #/AREA URNS HPF: 8 /HPF (ref 0–4)
SODIUM SERPL-SCNC: 138 MMOL/L (ref 136–145)
SP GR UR STRIP: 1.02 (ref 1–1.03)
SQUAMOUS #/AREA URNS HPF: 4 /HPF
URN SPEC COLLECT METH UR: ABNORMAL
UROBILINOGEN UR STRIP-ACNC: NEGATIVE EU/DL
WBC # BLD AUTO: 5.03 K/UL (ref 3.9–12.7)
WBC #/AREA URNS HPF: 3 /HPF (ref 0–5)

## 2021-06-20 PROCEDURE — 81000 URINALYSIS NONAUTO W/SCOPE: CPT | Performed by: NURSE PRACTITIONER

## 2021-06-20 PROCEDURE — 80053 COMPREHEN METABOLIC PANEL: CPT | Performed by: NURSE PRACTITIONER

## 2021-06-20 PROCEDURE — 84702 CHORIONIC GONADOTROPIN TEST: CPT | Performed by: NURSE PRACTITIONER

## 2021-06-20 PROCEDURE — 99284 EMERGENCY DEPT VISIT MOD MDM: CPT | Mod: 25

## 2021-06-20 PROCEDURE — 81025 URINE PREGNANCY TEST: CPT | Performed by: NURSE PRACTITIONER

## 2021-06-20 PROCEDURE — 36415 COLL VENOUS BLD VENIPUNCTURE: CPT | Performed by: NURSE PRACTITIONER

## 2021-06-20 PROCEDURE — 86900 BLOOD TYPING SEROLOGIC ABO: CPT | Performed by: NURSE PRACTITIONER

## 2021-06-20 PROCEDURE — 85025 COMPLETE CBC W/AUTO DIFF WBC: CPT | Performed by: NURSE PRACTITIONER

## 2021-06-20 RX ORDER — CEPHALEXIN 500 MG/1
500 CAPSULE ORAL EVERY 8 HOURS
Qty: 21 CAPSULE | Refills: 0 | Status: SHIPPED | OUTPATIENT
Start: 2021-06-20 | End: 2021-06-27

## 2021-07-04 ENCOUNTER — HOSPITAL ENCOUNTER (EMERGENCY)
Facility: OTHER | Age: 31
Discharge: HOME | End: 2021-07-04
Attending: EMERGENCY MEDICINE
Payer: OTHER GOVERNMENT

## 2021-07-04 VITALS
BODY MASS INDEX: 27.89 KG/M2 | SYSTOLIC BLOOD PRESSURE: 118 MMHG | TEMPERATURE: 98 F | HEART RATE: 63 BPM | RESPIRATION RATE: 18 BRPM | OXYGEN SATURATION: 100 % | DIASTOLIC BLOOD PRESSURE: 72 MMHG | WEIGHT: 184 LBS | HEIGHT: 68 IN

## 2021-07-04 DIAGNOSIS — S20.229A CONTUSION OF BACK, UNSPECIFIED LATERALITY, INITIAL ENCOUNTER: ICD-10-CM

## 2021-07-04 DIAGNOSIS — Z02.6 ENCOUNTER RELATED TO WORKER'S COMPENSATION CLAIM: Primary | ICD-10-CM

## 2021-07-04 LAB
B-HCG UR QL: NEGATIVE
CTP QC/QA: YES

## 2021-07-04 PROCEDURE — 81025 URINE PREGNANCY TEST: CPT | Performed by: EMERGENCY MEDICINE

## 2021-07-04 PROCEDURE — 25000003 PHARM REV CODE 250: Performed by: PHYSICIAN ASSISTANT

## 2021-07-04 PROCEDURE — 99284 EMERGENCY DEPT VISIT MOD MDM: CPT

## 2021-07-04 RX ORDER — LIDOCAINE 50 MG/G
1 PATCH TOPICAL DAILY
Qty: 15 PATCH | Refills: 0 | Status: ON HOLD | OUTPATIENT
Start: 2021-07-04 | End: 2022-02-05 | Stop reason: CLARIF

## 2021-07-04 RX ORDER — IBUPROFEN 600 MG/1
600 TABLET ORAL EVERY 6 HOURS PRN
Qty: 20 TABLET | Refills: 0 | Status: SHIPPED | OUTPATIENT
Start: 2021-07-04 | End: 2021-07-14

## 2021-07-04 RX ORDER — LIDOCAINE 50 MG/G
1 PATCH TOPICAL
Status: DISCONTINUED | OUTPATIENT
Start: 2021-07-04 | End: 2021-07-04 | Stop reason: HOSPADM

## 2021-07-04 RX ORDER — METHOCARBAMOL 750 MG/1
1500 TABLET, FILM COATED ORAL ONCE
Status: COMPLETED | OUTPATIENT
Start: 2021-07-04 | End: 2021-07-04

## 2021-07-04 RX ORDER — METHOCARBAMOL 750 MG/1
1500 TABLET, FILM COATED ORAL 3 TIMES DAILY
Qty: 30 TABLET | Refills: 0 | Status: SHIPPED | OUTPATIENT
Start: 2021-07-04 | End: 2021-07-09

## 2021-07-04 RX ORDER — KETOROLAC TROMETHAMINE 10 MG/1
10 TABLET, FILM COATED ORAL
Status: COMPLETED | OUTPATIENT
Start: 2021-07-04 | End: 2021-07-04

## 2021-07-04 RX ADMIN — METHOCARBAMOL TABLETS 1500 MG: 750 TABLET, COATED ORAL at 07:07

## 2021-07-04 RX ADMIN — KETOROLAC TROMETHAMINE 10 MG: 10 TABLET, FILM COATED ORAL at 07:07

## 2021-07-04 RX ADMIN — LIDOCAINE 1 PATCH: 50 PATCH TOPICAL at 07:07

## 2021-07-07 ENCOUNTER — OFFICE VISIT (OUTPATIENT)
Dept: URGENT CARE | Facility: CLINIC | Age: 31
End: 2021-07-07
Payer: OTHER GOVERNMENT

## 2021-07-07 VITALS
HEART RATE: 65 BPM | RESPIRATION RATE: 18 BRPM | SYSTOLIC BLOOD PRESSURE: 112 MMHG | TEMPERATURE: 98 F | DIASTOLIC BLOOD PRESSURE: 70 MMHG | WEIGHT: 184 LBS | HEIGHT: 68 IN | OXYGEN SATURATION: 96 % | BODY MASS INDEX: 27.89 KG/M2

## 2021-07-07 DIAGNOSIS — S30.0XXA LUMBAR CONTUSION, INITIAL ENCOUNTER: ICD-10-CM

## 2021-07-07 PROCEDURE — 99203 OFFICE O/P NEW LOW 30 MIN: CPT | Mod: S$GLB,,, | Performed by: NURSE PRACTITIONER

## 2021-07-07 PROCEDURE — 99203 PR OFFICE/OUTPT VISIT, NEW, LEVL III, 30-44 MIN: ICD-10-PCS | Mod: S$GLB,,, | Performed by: NURSE PRACTITIONER

## 2021-07-07 RX ORDER — METHOCARBAMOL 750 MG/1
1500 TABLET, FILM COATED ORAL 4 TIMES DAILY PRN
Qty: 56 TABLET | Refills: 0 | Status: SHIPPED | OUTPATIENT
Start: 2021-07-07 | End: 2021-07-14

## 2021-07-07 RX ORDER — IBUPROFEN 600 MG/1
600 TABLET ORAL EVERY 6 HOURS PRN
Qty: 30 TABLET | Refills: 0 | Status: SHIPPED | OUTPATIENT
Start: 2021-07-07 | End: 2021-07-14

## 2021-07-14 ENCOUNTER — OFFICE VISIT (OUTPATIENT)
Dept: URGENT CARE | Facility: CLINIC | Age: 31
End: 2021-07-14
Payer: OTHER GOVERNMENT

## 2021-07-14 VITALS
SYSTOLIC BLOOD PRESSURE: 111 MMHG | WEIGHT: 184 LBS | HEIGHT: 68 IN | HEART RATE: 71 BPM | DIASTOLIC BLOOD PRESSURE: 72 MMHG | OXYGEN SATURATION: 98 % | BODY MASS INDEX: 27.89 KG/M2 | TEMPERATURE: 98 F

## 2021-07-14 DIAGNOSIS — M62.830 MUSCLE SPASM OF BACK: ICD-10-CM

## 2021-07-14 DIAGNOSIS — S30.0XXD LUMBAR CONTUSION, SUBSEQUENT ENCOUNTER: Primary | ICD-10-CM

## 2021-07-14 PROCEDURE — 99214 OFFICE O/P EST MOD 30 MIN: CPT | Mod: S$GLB,,, | Performed by: NURSE PRACTITIONER

## 2021-07-14 PROCEDURE — 99214 PR OFFICE/OUTPT VISIT, EST, LEVL IV, 30-39 MIN: ICD-10-PCS | Mod: S$GLB,,, | Performed by: NURSE PRACTITIONER

## 2021-07-14 RX ORDER — DICLOFENAC SODIUM 50 MG/1
50 TABLET, DELAYED RELEASE ORAL 2 TIMES DAILY
Qty: 30 TABLET | Refills: 0 | Status: SHIPPED | OUTPATIENT
Start: 2021-07-14 | End: 2021-08-09 | Stop reason: ALTCHOICE

## 2021-07-14 RX ORDER — CYCLOBENZAPRINE HCL 5 MG
5 TABLET ORAL 3 TIMES DAILY PRN
Qty: 30 TABLET | Refills: 0 | Status: SHIPPED | OUTPATIENT
Start: 2021-07-14 | End: 2021-07-24

## 2021-07-26 ENCOUNTER — OFFICE VISIT (OUTPATIENT)
Dept: URGENT CARE | Facility: CLINIC | Age: 31
End: 2021-07-26
Payer: OTHER GOVERNMENT

## 2021-07-26 DIAGNOSIS — S30.0XXD LUMBAR CONTUSION, SUBSEQUENT ENCOUNTER: Primary | ICD-10-CM

## 2021-07-26 DIAGNOSIS — S33.5XXD LUMBAR SPRAIN, SUBSEQUENT ENCOUNTER: ICD-10-CM

## 2021-07-26 DIAGNOSIS — M62.830 MUSCLE SPASM OF BACK: ICD-10-CM

## 2021-07-26 PROCEDURE — 99214 OFFICE O/P EST MOD 30 MIN: CPT | Mod: S$GLB,,, | Performed by: EMERGENCY MEDICINE

## 2021-07-26 PROCEDURE — 99214 PR OFFICE/OUTPT VISIT, EST, LEVL IV, 30-39 MIN: ICD-10-PCS | Mod: S$GLB,,, | Performed by: EMERGENCY MEDICINE

## 2021-07-26 RX ORDER — TIZANIDINE 4 MG/1
4 TABLET ORAL EVERY 8 HOURS
Qty: 30 TABLET | Refills: 0 | Status: SHIPPED | OUTPATIENT
Start: 2021-07-26 | End: 2021-08-05

## 2021-07-26 RX ORDER — NAPROXEN 500 MG/1
500 TABLET ORAL 2 TIMES DAILY WITH MEALS
Qty: 20 TABLET | Refills: 0 | Status: SHIPPED | OUTPATIENT
Start: 2021-07-26 | End: 2021-08-09 | Stop reason: ALTCHOICE

## 2021-08-02 ENCOUNTER — OFFICE VISIT (OUTPATIENT)
Dept: URGENT CARE | Facility: CLINIC | Age: 31
End: 2021-08-02
Payer: OTHER GOVERNMENT

## 2021-08-02 DIAGNOSIS — M62.830 MUSCLE SPASM OF BACK: ICD-10-CM

## 2021-08-02 DIAGNOSIS — S33.5XXD LUMBAR SPRAIN, SUBSEQUENT ENCOUNTER: ICD-10-CM

## 2021-08-02 DIAGNOSIS — S30.0XXD LUMBAR CONTUSION, SUBSEQUENT ENCOUNTER: Primary | ICD-10-CM

## 2021-08-02 PROCEDURE — 99214 OFFICE O/P EST MOD 30 MIN: CPT | Mod: S$GLB,,, | Performed by: EMERGENCY MEDICINE

## 2021-08-02 PROCEDURE — 99214 PR OFFICE/OUTPT VISIT, EST, LEVL IV, 30-39 MIN: ICD-10-PCS | Mod: S$GLB,,, | Performed by: EMERGENCY MEDICINE

## 2021-08-09 ENCOUNTER — OFFICE VISIT (OUTPATIENT)
Dept: URGENT CARE | Facility: CLINIC | Age: 31
End: 2021-08-09
Payer: OTHER GOVERNMENT

## 2021-08-09 DIAGNOSIS — M54.50 ACUTE MIDLINE LOW BACK PAIN WITHOUT SCIATICA: ICD-10-CM

## 2021-08-09 DIAGNOSIS — Z98.890 HISTORY OF BACK SURGERY: ICD-10-CM

## 2021-08-09 DIAGNOSIS — S30.0XXD LUMBAR CONTUSION, SUBSEQUENT ENCOUNTER: Primary | ICD-10-CM

## 2021-08-09 PROCEDURE — 99214 OFFICE O/P EST MOD 30 MIN: CPT | Mod: S$GLB,,, | Performed by: PREVENTIVE MEDICINE

## 2021-08-09 PROCEDURE — 99214 PR OFFICE/OUTPT VISIT, EST, LEVL IV, 30-39 MIN: ICD-10-PCS | Mod: S$GLB,,, | Performed by: PREVENTIVE MEDICINE

## 2021-08-09 RX ORDER — METHOCARBAMOL 500 MG/1
500 TABLET, FILM COATED ORAL NIGHTLY
Qty: 30 TABLET | Refills: 1 | Status: ON HOLD | OUTPATIENT
Start: 2021-08-09 | End: 2022-02-05 | Stop reason: CLARIF

## 2021-08-09 RX ORDER — ETODOLAC 400 MG/1
400 TABLET, FILM COATED ORAL 2 TIMES DAILY
Qty: 60 TABLET | Refills: 1 | OUTPATIENT
Start: 2021-08-09 | End: 2021-08-18

## 2021-08-10 ENCOUNTER — PATIENT OUTREACH (OUTPATIENT)
Dept: EMERGENCY MEDICINE | Facility: OTHER | Age: 31
End: 2021-08-10

## 2021-08-10 ENCOUNTER — CLINICAL SUPPORT (OUTPATIENT)
Dept: REHABILITATION | Facility: HOSPITAL | Age: 31
End: 2021-08-10
Attending: NURSE PRACTITIONER
Payer: OTHER GOVERNMENT

## 2021-08-10 DIAGNOSIS — Z74.09 DECREASED FUNCTIONAL MOBILITY AND ENDURANCE: ICD-10-CM

## 2021-08-10 DIAGNOSIS — M62.81 MUSCLE WEAKNESS: ICD-10-CM

## 2021-08-10 DIAGNOSIS — M54.50 ACUTE BILATERAL LOW BACK PAIN WITHOUT SCIATICA: Primary | ICD-10-CM

## 2021-08-10 PROCEDURE — 97163 PT EVAL HIGH COMPLEX 45 MIN: CPT

## 2021-08-10 PROCEDURE — 97110 THERAPEUTIC EXERCISES: CPT

## 2021-08-12 ENCOUNTER — CLINICAL SUPPORT (OUTPATIENT)
Dept: REHABILITATION | Facility: HOSPITAL | Age: 31
End: 2021-08-12
Payer: OTHER GOVERNMENT

## 2021-08-12 DIAGNOSIS — Z74.09 DECREASED FUNCTIONAL MOBILITY AND ENDURANCE: ICD-10-CM

## 2021-08-12 DIAGNOSIS — M62.81 MUSCLE WEAKNESS: ICD-10-CM

## 2021-08-12 DIAGNOSIS — M54.50 ACUTE BILATERAL LOW BACK PAIN WITHOUT SCIATICA: Primary | ICD-10-CM

## 2021-08-12 PROCEDURE — 97110 THERAPEUTIC EXERCISES: CPT

## 2021-08-12 PROCEDURE — 97530 THERAPEUTIC ACTIVITIES: CPT

## 2021-08-12 PROCEDURE — 97014 ELECTRIC STIMULATION THERAPY: CPT

## 2021-08-12 PROCEDURE — 97112 NEUROMUSCULAR REEDUCATION: CPT

## 2021-08-13 ENCOUNTER — CLINICAL SUPPORT (OUTPATIENT)
Dept: REHABILITATION | Facility: HOSPITAL | Age: 31
End: 2021-08-13
Payer: OTHER GOVERNMENT

## 2021-08-13 DIAGNOSIS — M54.50 ACUTE BILATERAL LOW BACK PAIN WITHOUT SCIATICA: ICD-10-CM

## 2021-08-13 DIAGNOSIS — M62.81 MUSCLE WEAKNESS: ICD-10-CM

## 2021-08-13 DIAGNOSIS — Z74.09 DECREASED FUNCTIONAL MOBILITY AND ENDURANCE: ICD-10-CM

## 2021-08-13 PROCEDURE — 97112 NEUROMUSCULAR REEDUCATION: CPT | Mod: CQ

## 2021-08-13 PROCEDURE — 97110 THERAPEUTIC EXERCISES: CPT | Mod: CQ

## 2021-08-13 PROCEDURE — 97014 ELECTRIC STIMULATION THERAPY: CPT | Mod: CQ

## 2021-08-13 PROCEDURE — 97530 THERAPEUTIC ACTIVITIES: CPT | Mod: CQ

## 2021-08-16 ENCOUNTER — CLINICAL SUPPORT (OUTPATIENT)
Dept: REHABILITATION | Facility: HOSPITAL | Age: 31
End: 2021-08-16
Payer: OTHER GOVERNMENT

## 2021-08-16 DIAGNOSIS — Z74.09 DECREASED FUNCTIONAL MOBILITY AND ENDURANCE: ICD-10-CM

## 2021-08-16 DIAGNOSIS — M62.81 MUSCLE WEAKNESS: ICD-10-CM

## 2021-08-16 DIAGNOSIS — M54.50 ACUTE BILATERAL LOW BACK PAIN WITHOUT SCIATICA: ICD-10-CM

## 2021-08-16 PROCEDURE — 97014 ELECTRIC STIMULATION THERAPY: CPT | Mod: CQ

## 2021-08-16 PROCEDURE — 97530 THERAPEUTIC ACTIVITIES: CPT | Mod: CQ

## 2021-08-16 PROCEDURE — 97112 NEUROMUSCULAR REEDUCATION: CPT | Mod: CQ

## 2021-08-16 PROCEDURE — 97110 THERAPEUTIC EXERCISES: CPT | Mod: CQ

## 2021-08-18 ENCOUNTER — TELEPHONE (OUTPATIENT)
Dept: URGENT CARE | Facility: CLINIC | Age: 31
End: 2021-08-18

## 2021-08-18 ENCOUNTER — DOCUMENTATION ONLY (OUTPATIENT)
Dept: REHABILITATION | Facility: HOSPITAL | Age: 31
End: 2021-08-18

## 2021-08-18 ENCOUNTER — TELEPHONE (OUTPATIENT)
Dept: URGENT CARE | Facility: CLINIC | Age: 31
End: 2021-08-18
Payer: MEDICAID

## 2021-08-18 ENCOUNTER — HOSPITAL ENCOUNTER (EMERGENCY)
Facility: HOSPITAL | Age: 31
Discharge: HOME | End: 2021-08-18
Attending: EMERGENCY MEDICINE
Payer: MEDICAID

## 2021-08-18 VITALS
DIASTOLIC BLOOD PRESSURE: 79 MMHG | BODY MASS INDEX: 28.07 KG/M2 | HEART RATE: 78 BPM | HEIGHT: 68 IN | WEIGHT: 185.19 LBS | RESPIRATION RATE: 18 BRPM | TEMPERATURE: 98 F | SYSTOLIC BLOOD PRESSURE: 118 MMHG | OXYGEN SATURATION: 97 %

## 2021-08-18 DIAGNOSIS — Z3A.01 LESS THAN 8 WEEKS GESTATION OF PREGNANCY: ICD-10-CM
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
## 2021-08-18 DIAGNOSIS — S00.03XA CONTUSION OF SCALP, INITIAL ENCOUNTER: ICD-10-CM

## 2021-08-18 PROCEDURE — 99281 EMR DPT VST MAYX REQ PHY/QHP: CPT

## 2021-08-23 ENCOUNTER — OFFICE VISIT (OUTPATIENT)
Dept: URGENT CARE | Facility: CLINIC | Age: 31
End: 2021-08-23
Payer: OTHER GOVERNMENT

## 2021-08-23 DIAGNOSIS — Z98.890 HISTORY OF BACK SURGERY: ICD-10-CM

## 2021-08-23 DIAGNOSIS — M54.50 ACUTE MIDLINE LOW BACK PAIN WITHOUT SCIATICA: ICD-10-CM

## 2021-08-23 DIAGNOSIS — M62.830 MUSCLE SPASM OF BACK: ICD-10-CM

## 2021-08-23 DIAGNOSIS — S30.0XXD LUMBAR CONTUSION, SUBSEQUENT ENCOUNTER: Primary | ICD-10-CM

## 2021-08-23 PROCEDURE — 99214 OFFICE O/P EST MOD 30 MIN: CPT | Mod: S$GLB,,, | Performed by: PREVENTIVE MEDICINE

## 2021-08-23 PROCEDURE — 99214 PR OFFICE/OUTPT VISIT, EST, LEVL IV, 30-39 MIN: ICD-10-PCS | Mod: S$GLB,,, | Performed by: PREVENTIVE MEDICINE

## 2021-08-25 ENCOUNTER — DOCUMENTATION ONLY (OUTPATIENT)
Dept: REHABILITATION | Facility: HOSPITAL | Age: 31
End: 2021-08-25

## 2021-08-27 ENCOUNTER — CLINICAL SUPPORT (OUTPATIENT)
Dept: REHABILITATION | Facility: HOSPITAL | Age: 31
End: 2021-08-27
Payer: OTHER GOVERNMENT

## 2021-08-27 ENCOUNTER — DOCUMENTATION ONLY (OUTPATIENT)
Dept: REHABILITATION | Facility: HOSPITAL | Age: 31
End: 2021-08-27

## 2021-08-27 DIAGNOSIS — M54.50 ACUTE BILATERAL LOW BACK PAIN WITHOUT SCIATICA: Primary | ICD-10-CM

## 2021-08-27 DIAGNOSIS — Z74.09 DECREASED FUNCTIONAL MOBILITY AND ENDURANCE: ICD-10-CM

## 2021-08-27 DIAGNOSIS — M62.81 MUSCLE WEAKNESS: ICD-10-CM

## 2021-08-27 PROCEDURE — 97110 THERAPEUTIC EXERCISES: CPT

## 2021-08-27 PROCEDURE — 97530 THERAPEUTIC ACTIVITIES: CPT

## 2021-08-27 PROCEDURE — 97112 NEUROMUSCULAR REEDUCATION: CPT

## 2021-10-19 ENCOUNTER — HOSPITAL ENCOUNTER (EMERGENCY)
Facility: HOSPITAL | Age: 31
Discharge: HOME | End: 2021-10-19
Attending: EMERGENCY MEDICINE
Payer: MEDICAID

## 2021-10-19 VITALS
BODY MASS INDEX: 28.04 KG/M2 | WEIGHT: 185 LBS | SYSTOLIC BLOOD PRESSURE: 101 MMHG | HEART RATE: 80 BPM | OXYGEN SATURATION: 98 % | HEIGHT: 68 IN | TEMPERATURE: 98 F | RESPIRATION RATE: 20 BRPM | DIASTOLIC BLOOD PRESSURE: 60 MMHG

## 2021-10-19 DIAGNOSIS — N30.90 CYSTITIS: ICD-10-CM

## 2021-10-19 DIAGNOSIS — O20.0 THREATENED MISCARRIAGE: Primary | ICD-10-CM

## 2021-10-19 LAB
BACTERIA #/AREA URNS HPF: ABNORMAL /HPF
BILIRUB UR QL STRIP: NEGATIVE
CLARITY UR: ABNORMAL
COLOR UR: YELLOW
GLUCOSE UR QL STRIP: NEGATIVE
HGB UR QL STRIP: ABNORMAL
KETONES UR QL STRIP: NEGATIVE
LEUKOCYTE ESTERASE UR QL STRIP: ABNORMAL
MICROSCOPIC COMMENT: ABNORMAL
NITRITE UR QL STRIP: POSITIVE
PH UR STRIP: 7 [PH] (ref 5–8)
PROT UR QL STRIP: NEGATIVE
RBC #/AREA URNS HPF: 4 /HPF (ref 0–4)
SP GR UR STRIP: 1.02 (ref 1–1.03)
SQUAMOUS #/AREA URNS HPF: 2 /HPF
URN SPEC COLLECT METH UR: ABNORMAL
UROBILINOGEN UR STRIP-ACNC: NEGATIVE EU/DL
WBC #/AREA URNS HPF: 23 /HPF (ref 0–5)

## 2021-10-19 PROCEDURE — 81000 URINALYSIS NONAUTO W/SCOPE: CPT | Performed by: EMERGENCY MEDICINE

## 2021-10-19 PROCEDURE — 87086 URINE CULTURE/COLONY COUNT: CPT | Performed by: EMERGENCY MEDICINE

## 2021-10-19 PROCEDURE — 87088 URINE BACTERIA CULTURE: CPT | Performed by: EMERGENCY MEDICINE

## 2021-10-19 PROCEDURE — 87591 N.GONORRHOEAE DNA AMP PROB: CPT | Performed by: EMERGENCY MEDICINE

## 2021-10-19 PROCEDURE — 99284 EMERGENCY DEPT VISIT MOD MDM: CPT | Mod: 25

## 2021-10-19 PROCEDURE — 87077 CULTURE AEROBIC IDENTIFY: CPT | Performed by: EMERGENCY MEDICINE

## 2021-10-19 PROCEDURE — 87491 CHLMYD TRACH DNA AMP PROBE: CPT | Performed by: EMERGENCY MEDICINE

## 2021-10-19 PROCEDURE — 87186 SC STD MICRODIL/AGAR DIL: CPT | Performed by: EMERGENCY MEDICINE

## 2021-10-19 RX ORDER — NITROFURANTOIN 25; 75 MG/1; MG/1
100 CAPSULE ORAL 2 TIMES DAILY
Qty: 10 CAPSULE | Refills: 0 | Status: SHIPPED | OUTPATIENT
Start: 2021-10-19 | End: 2021-10-24

## 2021-10-20 LAB
C TRACH DNA SPEC QL NAA+PROBE: DETECTED
N GONORRHOEA DNA SPEC QL NAA+PROBE: NOT DETECTED

## 2021-10-21 LAB — BACTERIA UR CULT: ABNORMAL

## 2021-10-22 ENCOUNTER — DOCUMENTATION ONLY (OUTPATIENT)
Dept: REHABILITATION | Facility: HOSPITAL | Age: 31
End: 2021-10-22

## 2021-10-26 ENCOUNTER — DOCUMENTATION ONLY (OUTPATIENT)
Dept: REHABILITATION | Facility: HOSPITAL | Age: 31
End: 2021-10-26
Payer: MEDICAID

## 2021-10-26 PROBLEM — Z74.09 DECREASED FUNCTIONAL MOBILITY AND ENDURANCE: Status: RESOLVED | Noted: 2021-08-10 | Resolved: 2021-10-26

## 2021-10-26 PROBLEM — M62.81 MUSCLE WEAKNESS: Status: RESOLVED | Noted: 2021-08-10 | Resolved: 2021-10-26

## 2021-10-26 PROBLEM — M54.50 ACUTE BILATERAL LOW BACK PAIN WITHOUT SCIATICA: Status: RESOLVED | Noted: 2021-08-10 | Resolved: 2021-10-26

## 2022-02-05 ENCOUNTER — HOSPITAL ENCOUNTER (OUTPATIENT)
Facility: HOSPITAL | Age: 32
Discharge: HOME | End: 2022-02-05
Attending: STUDENT IN AN ORGANIZED HEALTH CARE EDUCATION/TRAINING PROGRAM | Admitting: STUDENT IN AN ORGANIZED HEALTH CARE EDUCATION/TRAINING PROGRAM
Payer: MEDICAID

## 2022-02-05 VITALS
DIASTOLIC BLOOD PRESSURE: 56 MMHG | HEIGHT: 68 IN | BODY MASS INDEX: 29.4 KG/M2 | WEIGHT: 194 LBS | SYSTOLIC BLOOD PRESSURE: 115 MMHG | RESPIRATION RATE: 16 BRPM | OXYGEN SATURATION: 100 % | HEART RATE: 64 BPM | TEMPERATURE: 99 F

## 2022-02-05 DIAGNOSIS — O26.899 ABDOMINAL PAIN IN PREGNANCY: ICD-10-CM

## 2022-02-05 DIAGNOSIS — R10.9 ABDOMINAL PAIN IN PREGNANCY: ICD-10-CM

## 2022-02-05 LAB
BACTERIA #/AREA URNS HPF: ABNORMAL /HPF
BILIRUB UR QL STRIP: NEGATIVE
CLARITY UR: ABNORMAL
COLOR UR: YELLOW
GLUCOSE UR QL STRIP: NEGATIVE
HGB UR QL STRIP: ABNORMAL
HYALINE CASTS #/AREA URNS LPF: 52 /LPF
KETONES UR QL STRIP: NEGATIVE
LEUKOCYTE ESTERASE UR QL STRIP: ABNORMAL
MICROSCOPIC COMMENT: ABNORMAL
NITRITE UR QL STRIP: NEGATIVE
PH UR STRIP: 7 [PH] (ref 5–8)
PROT UR QL STRIP: ABNORMAL
RBC #/AREA URNS HPF: >100 /HPF (ref 0–4)
SP GR UR STRIP: 1.02 (ref 1–1.03)
SQUAMOUS #/AREA URNS HPF: 2 /HPF
URN SPEC COLLECT METH UR: ABNORMAL
UROBILINOGEN UR STRIP-ACNC: NEGATIVE EU/DL
WBC #/AREA URNS HPF: >100 /HPF (ref 0–5)

## 2022-02-05 PROCEDURE — 87186 SC STD MICRODIL/AGAR DIL: CPT | Performed by: STUDENT IN AN ORGANIZED HEALTH CARE EDUCATION/TRAINING PROGRAM

## 2022-02-05 PROCEDURE — 81001 URINALYSIS AUTO W/SCOPE: CPT | Performed by: STUDENT IN AN ORGANIZED HEALTH CARE EDUCATION/TRAINING PROGRAM

## 2022-02-05 PROCEDURE — 87077 CULTURE AEROBIC IDENTIFY: CPT | Performed by: STUDENT IN AN ORGANIZED HEALTH CARE EDUCATION/TRAINING PROGRAM

## 2022-02-05 PROCEDURE — 87086 URINE CULTURE/COLONY COUNT: CPT | Performed by: STUDENT IN AN ORGANIZED HEALTH CARE EDUCATION/TRAINING PROGRAM

## 2022-02-05 NOTE — NURSING
Patient presents to triage with c/o abdominal pain @ 30wk gestation. Clean catch urine collected and sent to lab.     1058: Urine results relayed to Dr. Bolaños, order received for antibiotic; called in to pt's pharmacy of choice. Written & verbal discharge instructions given to patient on UTI, prevention, importance of finishing antibiotics, physician follow-up. Patient verbalized understanding of instructions

## 2022-02-07 LAB — BACTERIA UR CULT: ABNORMAL

## 2022-11-03 ENCOUNTER — HOSPITAL ENCOUNTER (EMERGENCY)
Facility: HOSPITAL | Age: 32
Discharge: HOME | End: 2022-11-03
Attending: EMERGENCY MEDICINE
Payer: MEDICAID

## 2022-11-03 VITALS
DIASTOLIC BLOOD PRESSURE: 71 MMHG | OXYGEN SATURATION: 99 % | HEART RATE: 90 BPM | HEIGHT: 68 IN | WEIGHT: 180 LBS | RESPIRATION RATE: 18 BRPM | BODY MASS INDEX: 27.28 KG/M2 | TEMPERATURE: 98 F | SYSTOLIC BLOOD PRESSURE: 132 MMHG

## 2022-11-03 DIAGNOSIS — J06.9 VIRAL URI WITH COUGH: Primary | ICD-10-CM

## 2022-11-03 LAB
GROUP A STREP, MOLECULAR: NEGATIVE
INFLUENZA A, MOLECULAR: NEGATIVE
INFLUENZA B, MOLECULAR: NEGATIVE
SARS-COV-2 RDRP RESP QL NAA+PROBE: NEGATIVE
SPECIMEN SOURCE: NORMAL

## 2022-11-03 PROCEDURE — U0002 COVID-19 LAB TEST NON-CDC: HCPCS | Performed by: EMERGENCY MEDICINE

## 2022-11-03 PROCEDURE — 87502 INFLUENZA DNA AMP PROBE: CPT | Performed by: EMERGENCY MEDICINE

## 2022-11-03 PROCEDURE — 87651 STREP A DNA AMP PROBE: CPT | Performed by: EMERGENCY MEDICINE

## 2022-11-03 PROCEDURE — 99282 EMERGENCY DEPT VISIT SF MDM: CPT

## 2022-11-03 NOTE — DISCHARGE INSTRUCTIONS
You may take Zyrtec 10 mg daily and use Flonase over-the-counter steroid nasal spray for congestion.  Drink plenty of fluids.  Take Tylenol and ibuprofen as needed.  Follow-up with your primary care physician.  Return for worsening symptoms.

## 2022-11-03 NOTE — ED PROVIDER NOTES
Encounter Date: 11/3/2022       History     Chief Complaint   Patient presents with    Generalized Body Aches    Nasal Congestion    Cough     Pt son exposed to flu     32-year-old female with no past medical history presents emergency department with nasal congestion and cough.  Symptoms have been on going for the past couple of days.  Her son has similar symptoms.  He was exposed to the flu recently.  Patient denies any fever, vomiting, abdominal pain, urinary symptoms or diarrhea.    Review of patient's allergies indicates:  No Known Allergies  Past Medical History:   Diagnosis Date    COVID-19      Past Surgical History:   Procedure Laterality Date    EYE SURGERY       No family history on file.  Social History     Tobacco Use    Smoking status: Never    Smokeless tobacco: Never   Substance Use Topics    Alcohol use: No    Drug use: No     Review of Systems   Constitutional:  Negative for fever.   HENT:  Positive for congestion. Negative for sore throat.    Respiratory:  Positive for cough. Negative for shortness of breath.    Cardiovascular:  Negative for chest pain.   Gastrointestinal:  Negative for nausea.   Genitourinary:  Negative for dysuria.   Musculoskeletal:  Negative for back pain.   Skin:  Negative for rash.   Neurological:  Negative for weakness.   Hematological:  Does not bruise/bleed easily.   All other systems reviewed and are negative.    Physical Exam     Initial Vitals [11/03/22 0514]   BP Pulse Resp Temp SpO2   138/74 91 20 97.7 °F (36.5 °C) 99 %      MAP       --         Physical Exam    Nursing note and vitals reviewed.  Constitutional: She appears well-developed and well-nourished. No distress.   HENT:   Head: Normocephalic and atraumatic.   Posterior pharyngeal erythema with mild, symmetric tonsillar edema, no exudates, uvula midline, tonsillar pillars intact, normal voice   Eyes: EOM are normal.   Neck: Neck supple.   Normal range of motion.  Cardiovascular:  Normal rate, regular rhythm,  normal heart sounds and intact distal pulses.           No murmur heard.  Pulmonary/Chest: Breath sounds normal. She has no wheezes. She has no rhonchi. She has no rales.   Abdominal: She exhibits no distension.   Musculoskeletal:         General: Normal range of motion.      Cervical back: Normal range of motion and neck supple.     Neurological: She is alert and oriented to person, place, and time.   Skin: Skin is warm.   Psychiatric: Thought content normal.     ED Course   Procedures  Labs Reviewed   GROUP A STREP, MOLECULAR   INFLUENZA A AND B ANTIGEN    Narrative:     Specimen Source->Nasopharyngeal Swab   SARS-COV-2 RNA AMPLIFICATION, QUAL          Imaging Results    None          Medications - No data to display  Medical Decision Making:   ED Management:  32-year-old female presents emergency department with upper respiratory symptoms.  She is nontoxic and well-hydrated.  No pulmonary findings on exam.  Screening COVID, influenza and strep were negative.  Suspect viral upper respiratory infection.  No indication for antibiotics at this time.  Counseled on second-generation antihistamine, Flonase and Tylenol and ibuprofen as needed.  Push p.o. fluids. The patient is aware of and agrees with the plan of care. Detailed return precautions discussed.    Dory Yoo MD  Emergency Medicine  11/03/2022 7:15 AM                                Clinical Impression:   Final diagnoses:  [J06.9] Viral URI with cough (Primary)      ED Disposition Condition    Discharge Stable          ED Prescriptions    None       Follow-up Information       Follow up With Specialties Details Why Contact Info Additional Information    [facility] Schedule an appointment as soon as possible for a visit in 2 days  [address] [place] [facility] [address]  [phone]       [facility] - Emergency Dept Emergency Medicine  As needed, If symptoms worsen [address]  [place]  [place] [address]  [phone] 1st floor             Dory Yoo MD  11/03/22 0715

## 2022-11-03 NOTE — Clinical Note
"Javier Kylee" Monica was seen and treated in our emergency department on 11/3/2022.  She may return to work on 11/04/2022.       If you have any questions or concerns, please don't hesitate to call.      Carmela Stubbs RN    "

## 2023-01-16 ENCOUNTER — HOSPITAL ENCOUNTER (EMERGENCY)
Facility: HOSPITAL | Age: 33
Discharge: HOME | End: 2023-01-16
Attending: EMERGENCY MEDICINE
Payer: MEDICAID

## 2023-01-16 VITALS
OXYGEN SATURATION: 97 % | SYSTOLIC BLOOD PRESSURE: 115 MMHG | TEMPERATURE: 98 F | RESPIRATION RATE: 18 BRPM | HEART RATE: 84 BPM | DIASTOLIC BLOOD PRESSURE: 78 MMHG

## 2023-01-16 DIAGNOSIS — S39.012A LUMBAR STRAIN, INITIAL ENCOUNTER: Primary | ICD-10-CM

## 2023-01-16 LAB
B-HCG UR QL: NEGATIVE
CTP QC/QA: YES

## 2023-01-16 PROCEDURE — 25000003 PHARM REV CODE 250: Performed by: EMERGENCY MEDICINE

## 2023-01-16 PROCEDURE — 99283 EMERGENCY DEPT VISIT LOW MDM: CPT

## 2023-01-16 PROCEDURE — 81025 URINE PREGNANCY TEST: CPT

## 2023-01-16 RX ORDER — METHOCARBAMOL 750 MG/1
750 TABLET, FILM COATED ORAL 3 TIMES DAILY
Qty: 15 TABLET | Refills: 0 | Status: SHIPPED | OUTPATIENT
Start: 2023-01-16 | End: 2023-01-21

## 2023-01-16 RX ORDER — NAPROXEN 375 MG/1
375 TABLET ORAL 2 TIMES DAILY WITH MEALS
Qty: 14 TABLET | Refills: 0 | Status: SHIPPED | OUTPATIENT
Start: 2023-01-16 | End: 2023-01-23

## 2023-01-16 RX ORDER — ACETAMINOPHEN 500 MG
500 TABLET ORAL
Status: COMPLETED | OUTPATIENT
Start: 2023-01-16 | End: 2023-01-16

## 2023-01-16 RX ADMIN — ACETAMINOPHEN 500 MG: 500 TABLET ORAL at 04:01

## 2023-01-16 NOTE — ED TRIAGE NOTES
Pt c/o mid lower back pain x Friday after lifting at work denies any numbness/tingling denies any GI./ difficulty ambulatory without difficulty

## 2023-01-16 NOTE — ED PROVIDER NOTES
Encounter Date: 1/16/2023       History     Chief Complaint   Patient presents with    Back Pain     32-year-old female presents emergency department for emergent evaluation of low back pain.  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  Patient reports any type of movement or bending makes the pain worse resting makes the pain better.  Patient has not taken any over-the-counter medications for relief of symptoms.    Review of patient's allergies indicates:  No Known Allergies  Past Medical History:   Diagnosis Date    COVID-19      Past Surgical History:   Procedure Laterality Date    EYE SURGERY       No family history on file.  Social History     Tobacco Use    Smoking status: Never    Smokeless tobacco: Never   Substance Use Topics    Alcohol use: No    Drug use: No     Review of Systems   Constitutional: Negative.    HENT: Negative.     Respiratory: Negative.     Cardiovascular: Negative.    Gastrointestinal: Negative.    Genitourinary: Negative.    Musculoskeletal:  Positive for back pain.   Hematological: Negative.    Psychiatric/Behavioral: Negative.     All other systems reviewed and are negative.    Physical Exam     Initial Vitals [01/16/23 1525]   BP Pulse Resp Temp SpO2   115/78 84 18 97.8 °F (36.6 °C) 97 %      MAP       --         Physical Exam    Nursing note and vitals reviewed.  Constitutional: She appears well-developed and well-nourished.   HENT:   Head: Normocephalic and atraumatic.   Cardiovascular:  Normal rate, regular rhythm, normal heart sounds and intact distal pulses.           Pulmonary/Chest: Breath sounds normal.   Abdominal: Abdomen is soft. Bowel sounds are normal. There is no abdominal tenderness.   Musculoskeletal:      Lumbar back: No edema, signs of trauma or bony tenderness. Decreased range of motion. Negative right straight leg raise test and negative left straight leg raise test.       Comments: Patient has tenderness across lower back area that is reproducible on my exam with any type of movement or bending.  Patient is able to heel walk, toe walk.     Neurological: She is alert and oriented to person, place, and time. She has normal strength and normal reflexes. Coordination and gait normal. GCS score is 15. GCS eye subscore is 4. GCS verbal subscore is 5. GCS motor subscore is 6.   Skin: Skin is warm and dry. No rash noted.   Psychiatric: She has a normal mood and affect.       ED Course   Procedures  Labs Reviewed   POCT URINE PREGNANCY          Imaging Results              X-Ray Lumbar Spine Ap And Lateral (In process)                      Medications   acetaminophen tablet 500 mg (500 mg Oral Given 1/16/23 5718)     Medical Decision Making:   History:   Old Records Summarized: records from previous admission(s).  Initial Assessment:   32-year-old female presents emergency department for emergent evaluation of low back pain.  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  Patient reports any type of movement or bending makes the pain worse resting makes the pain better.  Patient has not taken any over-the-counter medications for relief of symptoms.    Differential Diagnosis:   Considerations include lumbar radiculopathy, musculoskeletal strain, degenerative disc disease  Independently Interpreted Test(s):   I have ordered and independently interpreted X-rays - see prior notes.  Clinical Tests:   Lab Tests: Ordered and Reviewed  Radiological Study: Ordered and Reviewed  ED Management:  32-year-old female presents emergency department with complaint of low back pain.  Patient reports that she injured her back after possibly lifting something while at work few days prior patient has no neurological deficits she is able to ambulate without difficulty she is able to heel walk, toe walk.  Negative straight leg  "raise bilaterally denies bowel or bladder incontinence, urinary tension or saddle anesthesia.  Patient has no "red flags" requiring further emergent imaging.  L-spine x-rays are unremarkable patient will be treated conservatively at that time discharged home with Naprosyn and Robaxin given return precautions                 I was personally available for consultation in the emergency department.  I have not seen this patient.    Jonathan Del Castillo MD MPH  01/16/2023 6:48 PM         Clinical Impression:   Final diagnoses:  [S39.012A] Lumbar strain, initial encounter (Primary)        ED Disposition Condition    Discharge Stable          ED Prescriptions       Medication Sig Dispense Start Date End Date Auth. Provider    naproxen (NAPROSYN) 375 MG tablet Take 1 tablet (375 mg total) by mouth 2 (two) times daily with meals. for 7 days 14 tablet 1/16/2023 1/23/2023 KAI Hill    methocarbamoL (ROBAXIN) 750 MG Tab Take 1 tablet (750 mg total) by mouth 3 (three) times daily. for 5 days 15 tablet 1/16/2023 1/21/2023 KAI Hill          Follow-up Information       Follow up With Specialties Details Why Contact [facility]  Schedule an appointment as soon as possible for a visit in 2 days  [address] [place] [address]  [phone]               KAI Hill  01/16/23 2966       Jonathan Del Castillo Jr., MD  01/16/23 1749    "

## 2023-01-16 NOTE — FIRST PROVIDER EVALUATION
Medical screening examination initiated.  I have conducted a focused provider triage encounter, findings are as follows:    Brief history of present illness:  Patient presents to ED for concern for back pain.  Patient reports the pain started on Friday and has progressively gotten worse.  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  Patient reports she thinks she pulled something at work.  Patient denies any bowel or bladder symptoms.    There were no vitals filed for this visit.    Pertinent physical exam:  Patient is alert in no acute distress.  Patient has lumbar tenderness.    Brief workup plan:  X-rays    Preliminary workup initiated; this workup will be continued and followed by the physician or advanced practice provider that is assigned to the patient when roomed.

## 2023-01-16 NOTE — DISCHARGE INSTRUCTIONS
Ice every 2 hours for 20 minutes   Take medications as directed   Follow-up as directed   Pain is persistent you may need further outpatient MRI and/or physical therapy   Return for any concerns

## 2023-06-22 ENCOUNTER — OFFICE VISIT (OUTPATIENT)
Dept: ORTHOPEDICS | Facility: CLINIC | Age: 33
End: 2023-06-22
Payer: MEDICAID

## 2023-06-22 ENCOUNTER — PATIENT MESSAGE (OUTPATIENT)
Dept: ORTHOPEDICS | Facility: CLINIC | Age: 33
End: 2023-06-22
Payer: MEDICAID

## 2023-06-22 ENCOUNTER — HOSPITAL ENCOUNTER (EMERGENCY)
Facility: OTHER | Age: 33
Discharge: HOME | End: 2023-06-22
Attending: EMERGENCY MEDICINE
Payer: MEDICAID

## 2023-06-22 ENCOUNTER — ANESTHESIA EVENT (OUTPATIENT)
Dept: SURGERY | Facility: HOSPITAL | Age: 33
End: 2023-06-22
Payer: MEDICAID

## 2023-06-22 ENCOUNTER — TELEPHONE (OUTPATIENT)
Dept: ORTHOPEDICS | Facility: CLINIC | Age: 33
End: 2023-06-22
Payer: MEDICAID

## 2023-06-22 VITALS
DIASTOLIC BLOOD PRESSURE: 73 MMHG | HEART RATE: 79 BPM | OXYGEN SATURATION: 98 % | SYSTOLIC BLOOD PRESSURE: 131 MMHG | RESPIRATION RATE: 16 BRPM | WEIGHT: 194 LBS | TEMPERATURE: 98 F | BODY MASS INDEX: 29.5 KG/M2

## 2023-06-22 DIAGNOSIS — S67.10XA OPEN CRUSHING INJURY OF FINGER: Primary | ICD-10-CM

## 2023-06-22 DIAGNOSIS — M79.641 PAIN OF RIGHT HAND: ICD-10-CM

## 2023-06-22 DIAGNOSIS — S61.209A OPEN CRUSHING INJURY OF FINGER: Primary | ICD-10-CM

## 2023-06-22 DIAGNOSIS — T14.8XXA OPEN FRACTURE: Primary | ICD-10-CM

## 2023-06-22 LAB
B-HCG UR QL: NEGATIVE
BASOPHILS # BLD AUTO: 0.04 K/UL (ref 0–0.2)
BASOPHILS NFR BLD: 0.7 % (ref 0–1.9)
CTP QC/QA: YES
DIFFERENTIAL METHOD: ABNORMAL
EOSINOPHIL # BLD AUTO: 0.1 K/UL (ref 0–0.5)
EOSINOPHIL NFR BLD: 1.6 % (ref 0–8)
ERYTHROCYTE [DISTWIDTH] IN BLOOD BY AUTOMATED COUNT: 13.2 % (ref 11.5–14.5)
HCT VFR BLD AUTO: 33.9 % (ref 37–48.5)
HGB BLD-MCNC: 11.1 G/DL (ref 12–16)
IMM GRANULOCYTES # BLD AUTO: 0 K/UL (ref 0–0.04)
IMM GRANULOCYTES NFR BLD AUTO: 0 % (ref 0–0.5)
LYMPHOCYTES # BLD AUTO: 2.8 K/UL (ref 1–4.8)
LYMPHOCYTES NFR BLD: 51.7 % (ref 18–48)
MCH RBC QN AUTO: 30.1 PG (ref 27–31)
MCHC RBC AUTO-ENTMCNC: 32.7 G/DL (ref 32–36)
MCV RBC AUTO: 92 FL (ref 82–98)
MONOCYTES # BLD AUTO: 0.3 K/UL (ref 0.3–1)
MONOCYTES NFR BLD: 6 % (ref 4–15)
NEUTROPHILS # BLD AUTO: 2.2 K/UL (ref 1.8–7.7)
NEUTROPHILS NFR BLD: 40 % (ref 38–73)
NRBC BLD-RTO: 0 /100 WBC
PLATELET # BLD AUTO: 323 K/UL (ref 150–450)
PMV BLD AUTO: 9.3 FL (ref 9.2–12.9)
RBC # BLD AUTO: 3.69 M/UL (ref 4–5.4)
WBC # BLD AUTO: 5.47 K/UL (ref 3.9–12.7)

## 2023-06-22 PROCEDURE — 99999 PR PBB SHADOW E&M-EST. PATIENT-LVL III: ICD-10-PCS | Mod: PBBFAC,,, | Performed by: PHYSICIAN ASSISTANT

## 2023-06-22 PROCEDURE — 96375 TX/PRO/DX INJ NEW DRUG ADDON: CPT

## 2023-06-22 PROCEDURE — 99999 PR PBB SHADOW E&M-EST. PATIENT-LVL III: CPT | Mod: PBBFAC,,, | Performed by: PHYSICIAN ASSISTANT

## 2023-06-22 PROCEDURE — 81025 URINE PREGNANCY TEST: CPT | Performed by: EMERGENCY MEDICINE

## 2023-06-22 PROCEDURE — 99213 OFFICE O/P EST LOW 20 MIN: CPT | Mod: PBBFAC,PN | Performed by: PHYSICIAN ASSISTANT

## 2023-06-22 PROCEDURE — 1159F MED LIST DOCD IN RCRD: CPT | Mod: CPTII,ICN,, | Performed by: PHYSICIAN ASSISTANT

## 2023-06-22 PROCEDURE — 85025 COMPLETE CBC W/AUTO DIFF WBC: CPT | Performed by: EMERGENCY MEDICINE

## 2023-06-22 PROCEDURE — 99204 OFFICE O/P NEW MOD 45 MIN: CPT | Mod: S$PBB,57,ICN, | Performed by: PHYSICIAN ASSISTANT

## 2023-06-22 PROCEDURE — 25000003 PHARM REV CODE 250: Performed by: EMERGENCY MEDICINE

## 2023-06-22 PROCEDURE — 1159F PR MEDICATION LIST DOCUMENTED IN MEDICAL RECORD: ICD-10-PCS | Mod: CPTII,ICN,, | Performed by: PHYSICIAN ASSISTANT

## 2023-06-22 PROCEDURE — 99204 PR OFFICE/OUTPT VISIT, NEW, LEVL IV, 45-59 MIN: ICD-10-PCS | Mod: S$PBB,57,ICN, | Performed by: PHYSICIAN ASSISTANT

## 2023-06-22 PROCEDURE — 96365 THER/PROPH/DIAG IV INF INIT: CPT

## 2023-06-22 PROCEDURE — 63600175 PHARM REV CODE 636 W HCPCS: Performed by: EMERGENCY MEDICINE

## 2023-06-22 PROCEDURE — 99284 EMERGENCY DEPT VISIT MOD MDM: CPT | Mod: 27

## 2023-06-22 RX ORDER — MUPIROCIN 20 MG/G
OINTMENT TOPICAL
Status: CANCELLED | OUTPATIENT
Start: 2023-06-22

## 2023-06-22 RX ORDER — ACETAMINOPHEN 500 MG
1000 TABLET ORAL 2 TIMES DAILY
Qty: 20 TABLET | Refills: 0 | Status: SHIPPED | OUTPATIENT
Start: 2023-06-22 | End: 2023-06-24 | Stop reason: SDUPTHER

## 2023-06-22 RX ORDER — CEFAZOLIN SODIUM 2 G/50ML
2 SOLUTION INTRAVENOUS
Status: CANCELLED | OUTPATIENT
Start: 2023-06-22

## 2023-06-22 RX ORDER — TRAMADOL HYDROCHLORIDE 50 MG/1
50 TABLET ORAL EVERY 6 HOURS
Qty: 20 TABLET | Refills: 0 | Status: SHIPPED | OUTPATIENT
Start: 2023-06-22 | End: 2024-03-26

## 2023-06-22 RX ORDER — OXYCODONE AND ACETAMINOPHEN 5; 325 MG/1; MG/1
1 TABLET ORAL
Status: COMPLETED | OUTPATIENT
Start: 2023-06-22 | End: 2023-06-22

## 2023-06-22 RX ORDER — IBUPROFEN 600 MG/1
600 TABLET ORAL 3 TIMES DAILY
Qty: 20 TABLET | Refills: 0 | Status: SHIPPED | OUTPATIENT
Start: 2023-06-22 | End: 2023-06-24

## 2023-06-22 RX ORDER — CEPHALEXIN 500 MG/1
500 CAPSULE ORAL 4 TIMES DAILY
Qty: 20 CAPSULE | Refills: 0 | Status: ON HOLD | OUTPATIENT
Start: 2023-06-22 | End: 2023-06-23 | Stop reason: HOSPADM

## 2023-06-22 RX ORDER — OXYCODONE AND ACETAMINOPHEN 5; 325 MG/1; MG/1
1 TABLET ORAL EVERY 6 HOURS PRN
Qty: 12 TABLET | Refills: 0 | Status: ON HOLD | OUTPATIENT
Start: 2023-06-22 | End: 2023-06-23

## 2023-06-22 RX ORDER — MORPHINE SULFATE 4 MG/ML
4 INJECTION, SOLUTION INTRAMUSCULAR; INTRAVENOUS
Status: COMPLETED | OUTPATIENT
Start: 2023-06-22 | End: 2023-06-22

## 2023-06-22 RX ADMIN — MORPHINE SULFATE 4 MG: 4 INJECTION, SOLUTION INTRAMUSCULAR; INTRAVENOUS at 01:06

## 2023-06-22 RX ADMIN — CEFAZOLIN 2 G: 2 INJECTION, POWDER, FOR SOLUTION INTRAMUSCULAR; INTRAVENOUS at 01:06

## 2023-06-22 RX ADMIN — OXYCODONE HYDROCHLORIDE AND ACETAMINOPHEN 1 TABLET: 5; 325 TABLET ORAL at 01:06

## 2023-06-22 RX ADMIN — SODIUM CHLORIDE 1000 ML: 9 INJECTION, SOLUTION INTRAVENOUS at 01:06

## 2023-06-22 NOTE — DISCHARGE INSTRUCTIONS
You Have an Open fracture of the finger.  You need to see the hand surgeon today.  Take the medications as needed.    Mrs. Anderson,    Thank you for letting me care for you today! It was nice meeting you, and I hope you feel better soon.   If you would like access to your chart and what was done today please utilize the [facility].   Please come back to [facility] for all of your future medical needs.    Our goal in the emergency department is to always give you outstanding care and exceptional service. You may receive a survey by mail or e-mail in the next week regarding your experience in our ED. We would greatly appreciate you completing and returning the survey. Your feedback provides us with a way to recognize our staff who give very good care and it helps us learn how to improve when your experience was below our aspiration of excellence.     Sincerely,    Aman Barreto MD  Board Certified Emergency Physician

## 2023-06-22 NOTE — ANESTHESIA PREPROCEDURE EVALUATION
06/23/2023  Javier Anderson is a 33 y.o., female.    Procedure Summary    Case: [id number] Date/Time: 06/23/23 1013   Procedure: REPAIR, TENDON, FINGER - Right Long finger I&D, poss tendon repair, poss skin graft vs integra (Right: Hand)   Anesthesia type: Regional   Diagnosis: Open crushing injury of finger [S67.10XA, S61.209A]     Patient Active Problem List   Diagnosis   (none) - all problems resolved or deleted     Past Surgical History:   Procedure Laterality Date    EYE SURGERY       Current Discharge Medication List      START taking these medications    Details   acetaminophen (TYLENOL) 500 MG tablet Take 2 tablets (1,000 mg total) by mouth 2 (two) times a day.  Qty: 20 tablet, Refills: 0      ibuprofen (ADVIL,MOTRIN) 600 MG tablet Take 1 tablet (600 mg total) by mouth 3 (three) times daily.  Qty: 20 tablet, Refills: 0      traMADoL (ULTRAM) 50 mg tablet Take 1 tablet (50 mg total) by mouth every 6 (six) hours.  Qty: 20 tablet, Refills: 0         CONTINUE these medications which have NOT CHANGED    Details   cephALEXin (KEFLEX) 500 MG capsule Take 1 capsule (500 mg total) by mouth 4 (four) times daily. for 5 days  Qty: 20 capsule, Refills: 0      oxyCODONE-acetaminophen (PERCOCET) 5-325 mg per tablet Take 1 tablet by mouth every 6 (six) hours as needed for Pain.  Qty: 12 tablet, Refills: 0    Comments: Quantity prescribed more than 7 day supply? No      promethazine HCl (PHENERGAN ORAL) Take by mouth.               Pre-op Assessment    I have reviewed the Patient Summary Reports.     I have reviewed the Nursing Notes. I have reviewed the NPO Status.   I have reviewed the Medications.     Review of Systems  Anesthesia Hx:  Denies Family Hx of Anesthesia complications.   Denies Personal Hx of Anesthesia complications.   Social:  Non-Smoker, No Alcohol Use    Cardiovascular:  Cardiovascular Normal Exercise  tolerance: good     Pulmonary:  Pulmonary Normal        Physical Exam    Airway:  Mallampati: II / II  Mouth Opening: Normal  TM Distance: Normal  Tongue: Normal  Neck ROM: Normal ROM    Dental:  Intact        Anesthesia Plan  Type of Anesthesia, risks & benefits discussed:    Anesthesia Type: Gen Natural Airway, Regional  Intra-op Monitoring Plan: Standard ASA Monitors  Post Op Pain Control Plan: multimodal analgesia, peripheral nerve block and IV/PO Opioids PRN  Induction:  IV  Informed Consent: Informed consent signed with the Patient and all parties understand the risks and agree with anesthesia plan.  All questions answered.   ASA Score: 2    Ready For Surgery From Anesthesia Perspective.     .

## 2023-06-22 NOTE — Clinical Note
"Javier"Ирина Anderson was seen and treated in our emergency department on 6/21/2023.  She may return to work on 06/26/2023.       If you have any questions or concerns, please don't hesitate to call.      Aman Barreto MD"

## 2023-06-22 NOTE — TELEPHONE ENCOUNTER
Spoke with the patient. Notified of 1030 AM arrival time to the [facility].  Informed of current visitor policy.  Reminded of NPO and need for transportation. Patient verbalized understanding to all.    Nimo Santoro MS, OTC  Clinical Assistant to Dr. Ross Dunbar [facility]

## 2023-06-22 NOTE — H&P (VIEW-ONLY)
[facility]  History & Physical  Orthopedics    SUBJECTIVE:      Chief Complaint: Right long finger injury    Referring Provider: Ruel Johnson MD Dr. Dunbar is the supervising physician for this encounter/patient    History of Present Illness:  Patient is a 33 y.o. right hand dominant female who presents today with complaints of right long finger crush injury occurred last night while at work, finger was caught in a machine. She was seen in the ED and hand surgery was contacted. She reports 10/10 pain, finger kept in dry dressing. Seen today for surgical evaluation of this injury.     Onset of symptoms/DOI was 6/21/23.    Symptoms are aggravated by activity and movement.    Symptoms are alleviated by  none .    Symptoms consist of pain and open wound .    The patient rates their pain as a 10/10.    Attempted treatment(s) and/or interventions include activity modifications, rest, antibiotic therapy and wound dressing .     The patient denies any fevers, chills, N/V, D/C and presents for evaluation.       Past Medical History:   Diagnosis Date    COVID-19      Past Surgical History:   Procedure Laterality Date    EYE SURGERY       Review of patient's allergies indicates:  No Known Allergies  Social History     Social History Narrative    Not on file     No family history on file.      Current Outpatient Medications:     cephALEXin (KEFLEX) 500 MG capsule, Take 1 capsule (500 mg total) by mouth 4 (four) times daily. for 5 days, Disp: 20 capsule, Rfl: 0    oxyCODONE-acetaminophen (PERCOCET) 5-325 mg per tablet, Take 1 tablet by mouth every 6 (six) hours as needed for Pain., Disp: 12 tablet, Rfl: 0    promethazine HCl (PHENERGAN ORAL), Take by mouth., Disp: , Rfl:   No current facility-administered medications for this visit.      Review of Systems:  Constitutional: no fever or chills  Eyes: no visual changes  ENT: no nasal congestion or sore throat  Respiratory: no cough or shortness of  Patients wife is calling, there has been a delay in his medication delivery and is asking for a week supply, 14 pills, of his Briviact 50mg to be sent to the [facility] in [place] on [facility].   breath  Cardiovascular: no chest pain  Gastrointestinal: no nausea or vomiting, tolerating diet  Musculoskeletal: pain, soreness, and wound    OBJECTIVE:      Vital Signs (Most Recent):  There were no vitals filed for this visit.  There is no height or weight on file to calculate BMI.      Physical Exam:  Constitutional: The patient appears well-developed and well-nourished. No distress.   Skin: No lesions appreciated  Head: Normocephalic and atraumatic.   Nose: Nose normal.   Ears: No deformities seen  Eyes: Conjunctivae and EOM are normal.   Neck: No tracheal deviation present.   Cardiovascular: Normal rate and intact distal pulses.    Pulmonary/Chest: Effort normal. No respiratory distress.   Abdominal: There is no guarding.   Neurological: The patient is alert.   Psychiatric: The patient has a normal mood and affect.     Right Hand/Wrist Examination:    Observation/Inspection:  Swelling  none    Deformity  none  Discoloration  none     Scars   none    Atrophy  none  Large surface of the dorsal DIP long finger is open/exposed, no current bleeding, exposed bone present    HAND/WRIST EXAMINATION:  Finkelstein's Test   Neg  WHAT Test    Neg  Snuff box tenderness   Neg  Cardenas's Test    Neg  Hook of Hamate Tenderness  Neg  CMC grind    Neg  Circumduction test   Neg    Neurovascular Exam:  Digits WWP, brisk CR < 3s throughout  NVI motor/LTS to M/R/U nerves, radial pulse 2+    ROM hand: not assessed due to condition    ROM wrist full, painless    ROM elbow full, painless    Abdomen not guarded  Respirations nonlabored  Perfusion intact          Diagnostic Results:     Imaging - I independently viewed the patient's imaging as well as the radiology report.      FINDINGS:  There is significant soft tissue injury noted involving the dorsal aspect of the 3rd digit of the right hand, with prominent soft tissue defect and injury dorsal to the level of the distal interphalangeal joint and proximal 2/3 of the distal phalanx.   On the lateral view there is appearance that may relate to open wound communication extending to the dorsal aspect of the distal interphalangeal joint.     The visualized osseous structures appear intact, there is no radiographic evidence for osseous destructive process or acute fracture deformity there is no evidence for dislocation.  Close clinical and historical correlation is otherwise needed to determine need for additional follow-up.     Impression:     Radiographic findings as above.      ASSESSMENT/PLAN:      33 y.o. yo female with Right long finger crush injury, exposed bone    Plan: The patient and I had a thorough discussion today.  We discussed the working diagnosis as well as several other potential alternative diagnoses.  Treatment options were discussed, both conservative and surgical, including risks/benefits of both options. Surgery is strongly recommended which she agrees to.    She is consented for Right long finger I&D, possible tendon/nerve/ligament repair, possible fracture fixation, possible skin grafting vs integra and any indicated procedures with Dr. Johnson on 6/23/23. She is placed in soft xeroform dressing today. Disability Desk information given today. OT ordered.    The patient has not responded to adequate non operative treatment at this time and/or non operative treatment is not indicated. Thus, the risks, benefits and alternatives to surgery were discussed with the patient in detail.  Specific risks include but are not limited to bleeding, infection, vessel and/or nerve damage, pain, numbness, tingling, compartment syndrome, need for additional surgery, failure to return to pre-injury and/or preoperative functional status, inability to return to work, scar sensitivity, delayed healing, complex regional pain syndrome, weakness, pulley injury, tendon injury, bowstringing, partial and/or incomplete relief of symptoms, persistence of and/or worsening of symptoms, hardware and/or surgical  failure, prominent and/or symptomatic hardware possibly necessitating future removal, osteomyelitis, amputation, loss of function, stiffness, rotational malalignment, functional debility, dysfunction, decreased  strength, need for prolonged postoperative rehabilitation, malunion, nonunion, deep venous thrombosis, pulmonary embolism, arthritis and death.  The patient states an understanding and wishes to proceed with surgery.   All questions were answered.  No guarantees were implied or stated.  Written informed consent was obtained.    Should the patient's symptoms worsen, persist, or fail to improve they should return for reevaluation and I would be happy to see them back anytime.           Please do not hesitate to reach out to us via email, phone, or MyChart with any questions, concerns, or feedback.

## 2023-06-22 NOTE — ED PROVIDER NOTES
"Encounter Date: 6/21/2023       History     Chief Complaint   Patient presents with    Hand Injury     Right hand "3rd digit" caught in the machine while at work.   Obvious laceration noted to the first knuckle down to the bone.      33-year-old woman who presents for evaluation of an injury of the right hand while working as a .  Finger became caught in a sorting belt at which time it ran along the back of her right middle finger causing intense pain until they could hit the emergency.  On the machine.  She is not taken anything to try and help with this, has never anything like this in the past that she can recall, denies any health issues otherwise.    Review of patient's allergies indicates:  No Known Allergies  Past Medical History:   Diagnosis Date    COVID-19      Past Surgical History:   Procedure Laterality Date    EYE SURGERY       No family history on file.  Social History     Tobacco Use    Smoking status: Never    Smokeless tobacco: Never   Substance Use Topics    Alcohol use: No    Drug use: No     Review of Systems  Constitutional-no fever  HEENT-no congestion  Eyes-no redness  Respiratory-no shortness of breath  Cardio-no chest pain  GI-no abdominal pain  Endocrine-no cold intolerance  -no difficulty urinating  MSK-positive finger injury  Skin-no rashes  Allergy-no environmental allergy  Neurologic-, no headache  Hematology-no swollen nodes  Behavioral-no confusion  Physical Exam     Initial Vitals   BP Pulse Resp Temp SpO2   06/22/23 0007 06/22/23 0007 06/22/23 0007 06/22/23 0200 06/22/23 0007   133/76 80 17 98.3 °F (36.8 °C) 98 %      MAP       --                Physical Exam  Constitutional:  Uncomfortable appearing 33-year-old female in moderate distress  Eyes: Conjunctivae normal.  ENT       Head: Normocephalic, atraumatic.       Nose: Normal external appearance        Mouth/Throat: no strigulous respirations   Hematological/Lymphatic/Immunilogical: no visible lymphadenopathy "   Cardiovascular: Normal rate,   Respiratory: Normal respiratory effort.   Gastrointestinal: non distended   Musculoskeletal: Normal range of motion in all extremities.       Neurologic: Alert, oriented. Normal speech and language. No gross focal neurologic deficits are appreciated.  Skin: Skin is warm, dry. No rash noted.  Psychiatric: Mood and affect are normal.   ED Course   Procedures  Labs Reviewed   CBC W/ AUTO DIFFERENTIAL - Abnormal; Notable for the following components:       Result Value    RBC 3.69 (*)     Hemoglobin 11.1 (*)     Hematocrit 33.9 (*)     Lymph % 51.7 (*)     All other components within normal limits   POCT URINE PREGNANCY          Imaging Results              X-Ray Finger 2 or More Views Right (Final result)  Result time 06/22/23 00:56:50      Final result by Andrea Marinelli MD (06/22/23 00:56:50)                   Impression:      Radiographic findings as above.      Electronically signed by: Andrea Marinelli  Date:    06/22/2023  Time:    00:56               Narrative:    EXAMINATION:  XR FINGER 2 OR MORE VIEWS RIGHT    CLINICAL HISTORY:  open fracture;    TECHNIQUE:  Radiographic examination of the 3rd digit, middle finger of the right hand was performed, 3 radiographs are submitted.    COMPARISON:  None    FINDINGS:  There is significant soft tissue injury noted involving the dorsal aspect of the 3rd digit of the right hand, with prominent soft tissue defect and injury dorsal to the level of the distal interphalangeal joint and proximal 2/3 of the distal phalanx.  On the lateral view there is appearance that may relate to open wound communication extending to the dorsal aspect of the distal interphalangeal joint.    The visualized osseous structures appear intact, there is no radiographic evidence for osseous destructive process or acute fracture deformity there is no evidence for dislocation.  Close clinical and historical correlation is otherwise needed to determine need for  additional follow-up.                                       Medications   morphine injection 4 mg (4 mg Intravenous Given 6/22/23 0105)   sodium chloride 0.9% bolus 1,000 mL 1,000 mL (0 mLs Intravenous Stopped 6/22/23 0156)   ceFAZolin 2 g in dextrose 5 % in water (D5W) 5 % 50 mL IVPB (MB+) (0 g Intravenous Stopped 6/22/23 0132)   oxyCODONE-acetaminophen 5-325 mg per tablet 1 tablet (1 tablet Oral Given 6/22/23 0158)     Medical Decision Making:   History:   Old Medical Records: I decided to obtain old medical records.  Old Records Summarized: records from clinic visits and records from previous admission(s).  Differential Diagnosis:   Open fracture, crush injury, tendon injury,  Independently Interpreted Test(s):   I have ordered and independently interpreted X-rays - see summary below.       <> Summary of X-Ray Reading(s): Obvious soft tissue defect in the dorsum of the right 3rd digit  Clinical Tests:   Lab Tests: Ordered and Reviewed  Radiological Study: Ordered and Reviewed  ED Management:  33-year-old female who presents with a large friction injury erosive into the bone in the right 3rd finger.    Administered antibiotics intravenously, administered analgesics intravenously.    Discussed with on-call hand surgeon Dr. Ruel Johnson who reviewed imaging as well as photos of the hand.  Will plan for application of Xeroform and gauze dressing.  Will plan for urgent evaluation this morning for potential operative intervention.  Discussed with patient at the bedside, will plan for discharge at this time, outpatient follow-up, returning case of worsening symptoms.                        Clinical Impression:   Final diagnoses:  [T14.8XXA] Open fracture (Primary)  [M79.641] Pain of right hand        ED Disposition Condition    Discharge Stable          ED Prescriptions       Medication Sig Dispense Start Date End Date Auth. Provider    cephALEXin (KEFLEX) 500 MG capsule Take 1 capsule (500 mg total) by mouth 4 (four)  times daily. for 5 days 20 capsule 6/22/2023 6/27/2023 Aman Barreto MD    oxyCODONE-acetaminophen (PERCOCET) 5-325 mg per tablet Take 1 tablet by mouth every 6 (six) hours as needed for Pain. 12 tablet 6/22/2023 -- Aman Barreto MD          Follow-up Information       Follow up With Specialties Details Why Contact Info    Ruel Johnson MD Hand Surgery, Orthopedic Surgery Go today For a follow up visit about today [facility] [address]  [phone]               Aman Barreto MD  06/22/23 6387

## 2023-06-22 NOTE — PROGRESS NOTES
[facility]  History & Physical  Orthopedics    SUBJECTIVE:      Chief Complaint: Right long finger injury    Referring Provider: Ruel Johnson MD Dr. Dunbar is the supervising physician for this encounter/patient    History of Present Illness:  Patient is a 33 y.o. right hand dominant female who presents today with complaints of right long finger crush injury occurred last night while at work, finger was caught in a machine. She was seen in the ED and hand surgery was contacted. She reports 10/10 pain, finger kept in dry dressing. Seen today for surgical evaluation of this injury.     Onset of symptoms/DOI was 6/21/23.    Symptoms are aggravated by activity and movement.    Symptoms are alleviated by  none .    Symptoms consist of pain and open wound .    The patient rates their pain as a 10/10.    Attempted treatment(s) and/or interventions include activity modifications, rest, antibiotic therapy and wound dressing .     The patient denies any fevers, chills, N/V, D/C and presents for evaluation.       Past Medical History:   Diagnosis Date    COVID-19      Past Surgical History:   Procedure Laterality Date    EYE SURGERY       Review of patient's allergies indicates:  No Known Allergies  Social History     Social History Narrative    Not on file     No family history on file.      Current Outpatient Medications:     cephALEXin (KEFLEX) 500 MG capsule, Take 1 capsule (500 mg total) by mouth 4 (four) times daily. for 5 days, Disp: 20 capsule, Rfl: 0    oxyCODONE-acetaminophen (PERCOCET) 5-325 mg per tablet, Take 1 tablet by mouth every 6 (six) hours as needed for Pain., Disp: 12 tablet, Rfl: 0    promethazine HCl (PHENERGAN ORAL), Take by mouth., Disp: , Rfl:   No current facility-administered medications for this visit.      Review of Systems:  Constitutional: no fever or chills  Eyes: no visual changes  ENT: no nasal congestion or sore throat  Respiratory: no cough or shortness of  breath  Cardiovascular: no chest pain  Gastrointestinal: no nausea or vomiting, tolerating diet  Musculoskeletal: pain, soreness, and wound    OBJECTIVE:      Vital Signs (Most Recent):  There were no vitals filed for this visit.  There is no height or weight on file to calculate BMI.      Physical Exam:  Constitutional: The patient appears well-developed and well-nourished. No distress.   Skin: No lesions appreciated  Head: Normocephalic and atraumatic.   Nose: Nose normal.   Ears: No deformities seen  Eyes: Conjunctivae and EOM are normal.   Neck: No tracheal deviation present.   Cardiovascular: Normal rate and intact distal pulses.    Pulmonary/Chest: Effort normal. No respiratory distress.   Abdominal: There is no guarding.   Neurological: The patient is alert.   Psychiatric: The patient has a normal mood and affect.     Right Hand/Wrist Examination:    Observation/Inspection:  Swelling  none    Deformity  none  Discoloration  none     Scars   none    Atrophy  none  Large surface of the dorsal DIP long finger is open/exposed, no current bleeding, exposed bone present    HAND/WRIST EXAMINATION:  Finkelstein's Test   Neg  WHAT Test    Neg  Snuff box tenderness   Neg  Cardenas's Test    Neg  Hook of Hamate Tenderness  Neg  CMC grind    Neg  Circumduction test   Neg    Neurovascular Exam:  Digits WWP, brisk CR < 3s throughout  NVI motor/LTS to M/R/U nerves, radial pulse 2+    ROM hand: not assessed due to condition    ROM wrist full, painless    ROM elbow full, painless    Abdomen not guarded  Respirations nonlabored  Perfusion intact          Diagnostic Results:     Imaging - I independently viewed the patient's imaging as well as the radiology report.      FINDINGS:  There is significant soft tissue injury noted involving the dorsal aspect of the 3rd digit of the right hand, with prominent soft tissue defect and injury dorsal to the level of the distal interphalangeal joint and proximal 2/3 of the distal phalanx.   On the lateral view there is appearance that may relate to open wound communication extending to the dorsal aspect of the distal interphalangeal joint.     The visualized osseous structures appear intact, there is no radiographic evidence for osseous destructive process or acute fracture deformity there is no evidence for dislocation.  Close clinical and historical correlation is otherwise needed to determine need for additional follow-up.     Impression:     Radiographic findings as above.      ASSESSMENT/PLAN:      33 y.o. yo female with Right long finger crush injury, exposed bone    Plan: The patient and I had a thorough discussion today.  We discussed the working diagnosis as well as several other potential alternative diagnoses.  Treatment options were discussed, both conservative and surgical, including risks/benefits of both options. Surgery is strongly recommended which she agrees to.    She is consented for Right long finger I&D, possible tendon/nerve/ligament repair, possible fracture fixation, possible skin grafting vs integra and any indicated procedures with Dr. Johnson on 6/23/23. She is placed in soft xeroform dressing today. Disability Desk information given today. OT ordered.    The patient has not responded to adequate non operative treatment at this time and/or non operative treatment is not indicated. Thus, the risks, benefits and alternatives to surgery were discussed with the patient in detail.  Specific risks include but are not limited to bleeding, infection, vessel and/or nerve damage, pain, numbness, tingling, compartment syndrome, need for additional surgery, failure to return to pre-injury and/or preoperative functional status, inability to return to work, scar sensitivity, delayed healing, complex regional pain syndrome, weakness, pulley injury, tendon injury, bowstringing, partial and/or incomplete relief of symptoms, persistence of and/or worsening of symptoms, hardware and/or surgical  failure, prominent and/or symptomatic hardware possibly necessitating future removal, osteomyelitis, amputation, loss of function, stiffness, rotational malalignment, functional debility, dysfunction, decreased  strength, need for prolonged postoperative rehabilitation, malunion, nonunion, deep venous thrombosis, pulmonary embolism, arthritis and death.  The patient states an understanding and wishes to proceed with surgery.   All questions were answered.  No guarantees were implied or stated.  Written informed consent was obtained.    Should the patient's symptoms worsen, persist, or fail to improve they should return for reevaluation and I would be happy to see them back anytime.           Please do not hesitate to reach out to us via email, phone, or MyChart with any questions, concerns, or feedback.

## 2023-06-23 ENCOUNTER — ANESTHESIA (OUTPATIENT)
Dept: SURGERY | Facility: HOSPITAL | Age: 33
End: 2023-06-23
Payer: MEDICAID

## 2023-06-23 ENCOUNTER — HOSPITAL ENCOUNTER (OUTPATIENT)
Facility: HOSPITAL | Age: 33
Discharge: HOME | End: 2023-06-23
Attending: ORTHOPAEDIC SURGERY | Admitting: ORTHOPAEDIC SURGERY
Payer: MEDICAID

## 2023-06-23 VITALS
DIASTOLIC BLOOD PRESSURE: 70 MMHG | OXYGEN SATURATION: 99 % | TEMPERATURE: 98 F | SYSTOLIC BLOOD PRESSURE: 117 MMHG | RESPIRATION RATE: 23 BRPM | HEART RATE: 60 BPM

## 2023-06-23 DIAGNOSIS — S61.209A OPEN CRUSHING INJURY OF FINGER: Primary | ICD-10-CM

## 2023-06-23 DIAGNOSIS — S67.10XA OPEN CRUSHING INJURY OF FINGER: Primary | ICD-10-CM

## 2023-06-23 LAB
B-HCG UR QL: NEGATIVE
CTP QC/QA: YES

## 2023-06-23 PROCEDURE — 36000707: Performed by: ORTHOPAEDIC SURGERY

## 2023-06-23 PROCEDURE — 01810 ANES PX NRV MUSC F/ARM WRST: CPT | Performed by: ORTHOPAEDIC SURGERY

## 2023-06-23 PROCEDURE — 15271 PR SKIN SUB GRAFT TRNK/ARM/LEG: ICD-10-PCS | Mod: 51,,, | Performed by: ORTHOPAEDIC SURGERY

## 2023-06-23 PROCEDURE — 71000033 HC RECOVERY, INTIAL HOUR: Performed by: ORTHOPAEDIC SURGERY

## 2023-06-23 PROCEDURE — 99900035 HC TECH TIME PER 15 MIN (STAT)

## 2023-06-23 PROCEDURE — 26765 PR OPEN TX DISTAL PHALANGEAL FRACTURE EACH: ICD-10-PCS | Mod: 51,F7,, | Performed by: ORTHOPAEDIC SURGERY

## 2023-06-23 PROCEDURE — 37000008 HC ANESTHESIA 1ST 15 MINUTES: Performed by: ORTHOPAEDIC SURGERY

## 2023-06-23 PROCEDURE — 63600175 PHARM REV CODE 636 W HCPCS: Performed by: NURSE ANESTHETIST, CERTIFIED REGISTERED

## 2023-06-23 PROCEDURE — 36000706: Performed by: ORTHOPAEDIC SURGERY

## 2023-06-23 PROCEDURE — 27201423 OPTIME MED/SURG SUP & DEVICES STERILE SUPPLY: Performed by: ORTHOPAEDIC SURGERY

## 2023-06-23 PROCEDURE — 25000003 PHARM REV CODE 250: Performed by: NURSE ANESTHETIST, CERTIFIED REGISTERED

## 2023-06-23 PROCEDURE — 26418 REPAIR FINGER TENDON: CPT | Mod: F7,51,, | Performed by: ORTHOPAEDIC SURGERY

## 2023-06-23 PROCEDURE — 11012 PR DEBRIDE ASSOC OPEN FX/DISLO SKIN/MUS/BONE: ICD-10-PCS | Mod: ,,, | Performed by: ORTHOPAEDIC SURGERY

## 2023-06-23 PROCEDURE — 11012 DEB SKIN BONE AT FX SITE: CPT | Mod: ,,, | Performed by: ORTHOPAEDIC SURGERY

## 2023-06-23 PROCEDURE — 64415 NJX AA&/STRD BRCH PLXS IMG: CPT | Performed by: SURGERY

## 2023-06-23 PROCEDURE — 81025 URINE PREGNANCY TEST: CPT | Performed by: ORTHOPAEDIC SURGERY

## 2023-06-23 PROCEDURE — 15271 SKIN SUB GRAFT TRNK/ARM/LEG: CPT | Mod: 51,,, | Performed by: ORTHOPAEDIC SURGERY

## 2023-06-23 PROCEDURE — 94761 N-INVAS EAR/PLS OXIMETRY MLT: CPT

## 2023-06-23 PROCEDURE — D9220A PRA ANESTHESIA: ICD-10-PCS | Mod: CRNA,,, | Performed by: NURSE ANESTHETIST, CERTIFIED REGISTERED

## 2023-06-23 PROCEDURE — D9220A PRA ANESTHESIA: Mod: CRNA,,, | Performed by: NURSE ANESTHETIST, CERTIFIED REGISTERED

## 2023-06-23 PROCEDURE — 63600175 PHARM REV CODE 636 W HCPCS: Performed by: SURGERY

## 2023-06-23 PROCEDURE — D9220A PRA ANESTHESIA: Mod: ANES,,, | Performed by: SURGERY

## 2023-06-23 PROCEDURE — D9220A PRA ANESTHESIA: ICD-10-PCS | Mod: ANES,,, | Performed by: SURGERY

## 2023-06-23 PROCEDURE — 63600175 PHARM REV CODE 636 W HCPCS: Performed by: PHYSICIAN ASSISTANT

## 2023-06-23 PROCEDURE — 25000003 PHARM REV CODE 250: Performed by: PHYSICIAN ASSISTANT

## 2023-06-23 PROCEDURE — 63600175 PHARM REV CODE 636 W HCPCS: Performed by: STUDENT IN AN ORGANIZED HEALTH CARE EDUCATION/TRAINING PROGRAM

## 2023-06-23 PROCEDURE — 71000015 HC POSTOP RECOV 1ST HR: Performed by: ORTHOPAEDIC SURGERY

## 2023-06-23 PROCEDURE — 37000009 HC ANESTHESIA EA ADD 15 MINS: Performed by: ORTHOPAEDIC SURGERY

## 2023-06-23 PROCEDURE — 26765 TREAT FINGER FRACTURE EACH: CPT | Mod: 51,F7,, | Performed by: ORTHOPAEDIC SURGERY

## 2023-06-23 PROCEDURE — C1769 GUIDE WIRE: HCPCS | Performed by: ORTHOPAEDIC SURGERY

## 2023-06-23 PROCEDURE — 26418 PR REPAIR EXTEN TENDON,DORSUM FINGR,EA: ICD-10-PCS | Mod: F7,51,, | Performed by: ORTHOPAEDIC SURGERY

## 2023-06-23 PROCEDURE — 11730 PR REMOVAL OF NAIL PLATE: ICD-10-PCS | Mod: F7,51,, | Performed by: ORTHOPAEDIC SURGERY

## 2023-06-23 PROCEDURE — 11730 AVULSION NAIL PLATE SIMPLE 1: CPT | Mod: F7,51,, | Performed by: ORTHOPAEDIC SURGERY

## 2023-06-23 PROCEDURE — C1713 ANCHOR/SCREW BN/BN,TIS/BN: HCPCS | Performed by: ORTHOPAEDIC SURGERY

## 2023-06-23 PROCEDURE — 64415 RIGHT SUPRACLAVICULAR SINGLE INJECTION: ICD-10-PCS | Mod: 59,RT,, | Performed by: SURGERY

## 2023-06-23 PROCEDURE — 25000003 PHARM REV CODE 250: Performed by: STUDENT IN AN ORGANIZED HEALTH CARE EDUCATION/TRAINING PROGRAM

## 2023-06-23 DEVICE — K-WIRE SNGL TRCR .045 D 6L N/S: Type: IMPLANTABLE DEVICE | Site: FINGER | Status: FUNCTIONAL

## 2023-06-23 DEVICE — ANCHOR CORKSCREW SUT MICRO 4-0: Type: IMPLANTABLE DEVICE | Site: FINGER | Status: FUNCTIONAL

## 2023-06-23 DEVICE — DRESSING MATRIX BILAYER 4X5IN: Type: IMPLANTABLE DEVICE | Site: FINGER | Status: FUNCTIONAL

## 2023-06-23 RX ORDER — MUPIROCIN 20 MG/G
OINTMENT TOPICAL
Status: DISCONTINUED | OUTPATIENT
Start: 2023-06-23 | End: 2023-06-23 | Stop reason: HOSPADM

## 2023-06-23 RX ORDER — LIDOCAINE HYDROCHLORIDE 20 MG/ML
INJECTION INTRAVENOUS
Status: DISCONTINUED | OUTPATIENT
Start: 2023-06-23 | End: 2023-06-23

## 2023-06-23 RX ORDER — MEDROXYPROGESTERONE ACETATE 150 MG/ML
150 INJECTION, SUSPENSION INTRAMUSCULAR
COMMUNITY
Start: 2022-08-19

## 2023-06-23 RX ORDER — PROPOFOL 10 MG/ML
VIAL (ML) INTRAVENOUS CONTINUOUS PRN
Status: DISCONTINUED | OUTPATIENT
Start: 2023-06-23 | End: 2023-06-23

## 2023-06-23 RX ORDER — ONDANSETRON 2 MG/ML
4 INJECTION INTRAMUSCULAR; INTRAVENOUS DAILY PRN
Status: ACTIVE | OUTPATIENT
Start: 2023-06-23

## 2023-06-23 RX ORDER — OXYCODONE AND ACETAMINOPHEN 5; 325 MG/1; MG/1
1-2 TABLET ORAL
Qty: 15 TABLET | Refills: 0 | Status: SHIPPED | OUTPATIENT
Start: 2023-06-23 | End: 2023-06-23

## 2023-06-23 RX ORDER — METHOCARBAMOL 500 MG/1
1000 TABLET, FILM COATED ORAL ONCE
Status: ACTIVE | OUTPATIENT
Start: 2023-06-23

## 2023-06-23 RX ORDER — AMOXICILLIN 500 MG/1
500 CAPSULE ORAL 3 TIMES DAILY
COMMUNITY
Start: 2023-06-19

## 2023-06-23 RX ORDER — DEXAMETHASONE SODIUM PHOSPHATE 4 MG/ML
INJECTION, SOLUTION INTRA-ARTICULAR; INTRALESIONAL; INTRAMUSCULAR; INTRAVENOUS; SOFT TISSUE
Status: DISCONTINUED | OUTPATIENT
Start: 2023-06-23 | End: 2023-06-23

## 2023-06-23 RX ORDER — FENTANYL CITRATE 50 UG/ML
25-200 INJECTION, SOLUTION INTRAMUSCULAR; INTRAVENOUS
Status: DISPENSED | OUTPATIENT
Start: 2023-06-23

## 2023-06-23 RX ORDER — HYDROCODONE BITARTRATE AND ACETAMINOPHEN 5; 325 MG/1; MG/1
1 TABLET ORAL
COMMUNITY
Start: 2023-06-19 | End: 2023-06-24 | Stop reason: ALTCHOICE

## 2023-06-23 RX ORDER — IBUPROFEN 800 MG/1
800 TABLET ORAL 3 TIMES DAILY
COMMUNITY
Start: 2023-06-19 | End: 2023-06-24

## 2023-06-23 RX ORDER — FAMOTIDINE 10 MG/ML
INJECTION INTRAVENOUS
Status: DISCONTINUED | OUTPATIENT
Start: 2023-06-23 | End: 2023-06-23

## 2023-06-23 RX ORDER — ROPIVACAINE HYDROCHLORIDE 5 MG/ML
INJECTION, SOLUTION EPIDURAL; INFILTRATION; PERINEURAL
Status: COMPLETED | OUTPATIENT
Start: 2023-06-23 | End: 2023-06-23

## 2023-06-23 RX ORDER — ONDANSETRON 2 MG/ML
INJECTION INTRAMUSCULAR; INTRAVENOUS
Status: DISCONTINUED | OUTPATIENT
Start: 2023-06-23 | End: 2023-06-23

## 2023-06-23 RX ORDER — OXYCODONE AND ACETAMINOPHEN 5; 325 MG/1; MG/1
1-2 TABLET ORAL
Qty: 15 TABLET | Refills: 0 | Status: SHIPPED | OUTPATIENT
Start: 2023-06-23 | End: 2023-06-24 | Stop reason: DRUGHIGH

## 2023-06-23 RX ORDER — MIDAZOLAM HYDROCHLORIDE 1 MG/ML
.5-4 INJECTION INTRAMUSCULAR; INTRAVENOUS
Status: DISPENSED | OUTPATIENT
Start: 2023-06-23

## 2023-06-23 RX ORDER — PROPOFOL 10 MG/ML
VIAL (ML) INTRAVENOUS
Status: DISCONTINUED | OUTPATIENT
Start: 2023-06-23 | End: 2023-06-23

## 2023-06-23 RX ORDER — DEXMEDETOMIDINE HYDROCHLORIDE 100 UG/ML
INJECTION, SOLUTION INTRAVENOUS
Status: DISCONTINUED | OUTPATIENT
Start: 2023-06-23 | End: 2023-06-23

## 2023-06-23 RX ORDER — SULFAMETHOXAZOLE AND TRIMETHOPRIM 800; 160 MG/1; MG/1
1 TABLET ORAL 2 TIMES DAILY
Qty: 28 TABLET | Refills: 0 | Status: SHIPPED | OUTPATIENT
Start: 2023-06-23 | End: 2023-07-07

## 2023-06-23 RX ORDER — HALOPERIDOL 5 MG/ML
0.5 INJECTION INTRAMUSCULAR EVERY 10 MIN PRN
Status: ACTIVE | OUTPATIENT
Start: 2023-06-23

## 2023-06-23 RX ORDER — ACETAMINOPHEN 500 MG
1000 TABLET ORAL
Status: COMPLETED | OUTPATIENT
Start: 2023-06-23 | End: 2023-06-23

## 2023-06-23 RX ORDER — PROMETHAZINE HYDROCHLORIDE AND DEXTROMETHORPHAN HYDROBROMIDE 6.25; 15 MG/5ML; MG/5ML
SYRUP ORAL
COMMUNITY
Start: 2023-04-24

## 2023-06-23 RX ORDER — CELECOXIB 200 MG/1
400 CAPSULE ORAL ONCE
Status: COMPLETED | OUTPATIENT
Start: 2023-06-23 | End: 2023-06-23

## 2023-06-23 RX ORDER — SODIUM CHLORIDE 0.9 % (FLUSH) 0.9 %
10 SYRINGE (ML) INJECTION
Status: ACTIVE | OUTPATIENT
Start: 2023-06-23

## 2023-06-23 RX ADMIN — PROPOFOL 30 MG: 10 INJECTION, EMULSION INTRAVENOUS at 01:06

## 2023-06-23 RX ADMIN — LIDOCAINE HYDROCHLORIDE 50 MG: 20 INJECTION INTRAVENOUS at 01:06

## 2023-06-23 RX ADMIN — SODIUM CHLORIDE: 9 INJECTION, SOLUTION INTRAVENOUS at 12:06

## 2023-06-23 RX ADMIN — DEXAMETHASONE SODIUM PHOSPHATE 4 MG: 4 INJECTION, SOLUTION INTRAMUSCULAR; INTRAVENOUS at 01:06

## 2023-06-23 RX ADMIN — FENTANYL CITRATE 100 MCG: 50 INJECTION INTRAMUSCULAR; INTRAVENOUS at 12:06

## 2023-06-23 RX ADMIN — DEXMEDETOMIDINE HYDROCHLORIDE 8 MCG: 100 INJECTION, SOLUTION INTRAVENOUS at 01:06

## 2023-06-23 RX ADMIN — CELECOXIB 400 MG: 200 CAPSULE ORAL at 11:06

## 2023-06-23 RX ADMIN — DEXMEDETOMIDINE HYDROCHLORIDE 4 MCG: 100 INJECTION, SOLUTION INTRAVENOUS at 01:06

## 2023-06-23 RX ADMIN — CEFAZOLIN 2 G: 2 INJECTION, POWDER, FOR SOLUTION INTRAMUSCULAR; INTRAVENOUS at 01:06

## 2023-06-23 RX ADMIN — MUPIROCIN: 20 OINTMENT TOPICAL at 11:06

## 2023-06-23 RX ADMIN — ACETAMINOPHEN 1000 MG: 500 TABLET ORAL at 11:06

## 2023-06-23 RX ADMIN — PROPOFOL 100 MCG/KG/MIN: 10 INJECTION, EMULSION INTRAVENOUS at 01:06

## 2023-06-23 RX ADMIN — SODIUM CHLORIDE, SODIUM GLUCONATE, SODIUM ACETATE, POTASSIUM CHLORIDE, MAGNESIUM CHLORIDE, SODIUM PHOSPHATE, DIBASIC, AND POTASSIUM PHOSPHATE: .53; .5; .37; .037; .03; .012; .00082 INJECTION, SOLUTION INTRAVENOUS at 01:06

## 2023-06-23 RX ADMIN — ROPIVACAINE HYDROCHLORIDE 30 ML: 5 INJECTION EPIDURAL; INFILTRATION; PERINEURAL at 12:06

## 2023-06-23 RX ADMIN — MIDAZOLAM 2 MG: 1 INJECTION INTRAMUSCULAR; INTRAVENOUS at 12:06

## 2023-06-23 RX ADMIN — ONDANSETRON 4 MG: 2 INJECTION, SOLUTION INTRAMUSCULAR; INTRAVENOUS at 01:06

## 2023-06-23 RX ADMIN — FAMOTIDINE 20 MG: 10 INJECTION, SOLUTION INTRAVENOUS at 01:06

## 2023-06-23 NOTE — BRIEF OP NOTE
[facility] - Surgery (Hospital)  Brief Operative Note    Surgery Date: 6/23/2023     Surgeon(s) and Role:     * Ruel Johnson MD - Primary    Assisting Surgeon: None    Pre-op Diagnosis:  Open crushing injury of finger [S67.10XA, S61.209A]    Post-op Diagnosis:  Post-Op Diagnosis Codes:     * Open crushing injury of finger [S67.10XA, S61.209A]    Procedure(s) (LRB):  REPAIR, TENDON, FINGER - Right Long finger I&D, poss tendon repair, poss skin graft vs integra (Right)    Anesthesia: Regional    Operative Findings: See op note    Estimated Blood Loss: minimal         Specimens:   Specimen (24h ago, onward)      None              Discharge Note    OUTCOME: Patient tolerated treatment/procedure well without complication and is now ready for discharge.    DISPOSITION: Home or Self Care    FINAL DIAGNOSIS:  <principal problem not specified>    FOLLOWUP: In clinic    DISCHARGE INSTRUCTIONS:    Discharge Procedure Orders   Notify your health care provider if you experience any of the following:  increased confusion or weakness     Notify your health care provider if you experience any of the following:  persistent dizziness, light-headedness, or visual disturbances     Notify your health care provider if you experience any of the following:  worsening rash     Notify your health care provider if you experience any of the following:  severe persistent headache     Notify your health care provider if you experience any of the following:  difficulty breathing or increased cough     Notify your health care provider if you experience any of the following:  redness, tenderness, or signs of infection (pain, swelling, redness, odor or green/yellow discharge around incision site)     Notify your health care provider if you experience any of the following:  severe uncontrolled pain     Notify your health care provider if you experience any of the following:  persistent nausea and vomiting or diarrhea     Notify your health care  provider if you experience any of the following:  temperature >100.4

## 2023-06-23 NOTE — ANESTHESIA PROCEDURE NOTES
Right Supraclavicular Single Injection    Patient location during procedure: pre-op   Block not for primary anesthetic.  Reason for block: at surgeon's request and post-op pain management   Post-op Pain Location: right hand   Start time: 6/23/2023 12:21 PM  Timeout: 6/23/2023 12:20 PM   End time: 6/23/2023 12:25 PM    Staffing  Authorizing Provider: Jacques Mathias MD  Performing Provider: Jacques Mathias MD    Preanesthetic Checklist  Completed: patient identified, IV checked, site marked, risks and benefits discussed, surgical consent, monitors and equipment checked, pre-op evaluation and timeout performed  Peripheral Block  Patient position: supine  Prep: ChloraPrep  Patient monitoring: heart rate, cardiac monitor, continuous pulse ox, continuous capnometry and frequent blood pressure checks  Block type: supraclavicular  Laterality: right  Injection technique: single shot  Needle  Needle type: Stimuplex   Needle gauge: 22 G  Needle length: 2 in  Needle localization: anatomical landmarks and ultrasound guidance   -ultrasound image captured on disc.  Assessment  Injection assessment: negative aspiration, negative parasthesia and local visualized surrounding nerve  Paresthesia pain: none  Heart rate change: no  Slow fractionated injection: yes    Medications:    Medications: ropivacaine (NAROPIN) injection 0.5% - Perineural   30 mL - 6/23/2023 12:25:00 PM    Additional Notes  VSS.  [facility] RN monitoring vitals throughout procedure.  Patient tolerated procedure well.

## 2023-06-23 NOTE — TRANSFER OF CARE
Anesthesia Transfer of Care Note    Patient: Javier Anderson    Procedure(s) Performed: Procedure(s) (LRB):  REPAIR, TENDON, FINGER - Right Long finger I&D, poss tendon repair, poss skin graft vs integra (Right)    Patient location: PACU    Anesthesia Type: general and regional    Transport from OR: Transported from OR on 100% O2 by closed face mask with adequate spontaneous ventilation    Post pain: adequate analgesia    Post assessment: no apparent anesthetic complications and tolerated procedure well    Post vital signs: stable    Level of consciousness: awake and responds to stimulation    Nausea/Vomiting: no nausea/vomiting    Complications: none    Transfer of care protocol was followed      Last vitals:   Visit Vitals  /71 (BP Location: Left arm, Patient Position: Lying)   Pulse 76   Resp 16   SpO2 100%   Breastfeeding No

## 2023-06-23 NOTE — PLAN OF CARE
Need site marked, updated h&p and anesthesia consent.    Patient's family is not here right now. Call light within reach. Bed in lowest, locked position with side rails up X2. Patient using cell phone. Belongings within reach. No apparent distress noted. Will continue to monitor. Belongings will be locked up once patient goes to surgery

## 2023-06-23 NOTE — PROGRESS NOTES
PVCs noted to monitor following block.  Patient asymptomatic with stable vital signs.  Fluids open.  Notified Dr. Mathias.  No further orders at this time.

## 2023-06-23 NOTE — PLAN OF CARE
Patient is AAO and VSS. Tolerating PO and states pain is tolerable. Dressing CDI. Patient states they are ready for d/c. IV removed. Catheter tip intact. Family at bedside. Discharge instructions reviewed and copy given to the patient and family. Questions answered. Both verbalized understanding. Medication delivered to bedside. Patient wheeled to car by Jaylene EDMONDS

## 2023-06-23 NOTE — OP NOTE
DATE OF PROCEDURE: 06/23/2023     COVID-19 attestation:  Due to the nature of this patient's condition and/or the presence of pain, debilitty, and/or dysfunction, proceeding with surgery was indicated.  Furthermore, this patient was treated during the COVID-19 pandemic.  This was discussed with the patient, they are aware of our current policies and procedures, were given the option of delaying their surgery when applicable and if acceptable, and they elect to proceed.  Delaying this patient's surgery greater than 30 days would be detrimental to their care and functional outcome and/or cause additional suffering, pain, discomfort, and/or dysfunction/debility.  This was confirmed and we thus proceeded.      SERVICE:  Orthopedic Surgery.     SURGEON:  Ruel Johnson M.D.     FIRST ASSISTANT:  MD FELTON Triplett     PREOPERATIVE DIAGNOSIS:    Crushing injury with partial degloving right long finger with extensive wounds     POSTOPERATIVE DIAGNOSIS:    Same     PROCEDURE(S) PERFORMED:    Irrigation and sharp excisional debridement right long finger with debridement of portions of nonviable skin, subcutaneous tissue, tendon, and bone  Removal of foreign material right long finger open wounds  Extensor tendon repair in zone 1 right long finger  Escharotomy right long finger  Transarticular extension pinning of the DIP joint of the right long finger  Placement of Integra skin substitute to the right long finger, 2 distinct wounds  Nail plate removal right long finger     TOURNIQUET TIME:  55 minutes via finger tourniquet     ESTIMATED BLOOD LOSS:  6 mL.     IMPLANTS:  Arthrex micro corkscrew anchor with FiberWire as well as Integra     COMPLICATIONS:  None.     PACKS AND DRAINS:  None.     PATHOLOGIC SPECIMENS:  None.    ANESTHESIA:  Regional     IV FLUIDS: Crystalloid.     CONDITION:  Stable.    MICROBIOLOGY: None.    Indications for Procedure:     The patient is a 33-year-old female who sustained a significant crushing injury  to the right long finger.  She had extensive soft tissue wounds with involvement of an exposed bone as well as obvious terminal tendon disruption.  The patient has not responded to adequate non operative treatment at this time and/or non operative treatment is not indicated. Thus, the risks, benefits and alternatives to surgery were discussed with the patient in detail.  Specific risks include but are not limited to bleeding, infection, vessel and/or nerve damage, pain, numbness, tingling, compartment syndrome, need for additional surgery, failure to return to pre-injury and/or preoperative functional status, inability to return to work, scar sensitivity, delayed healing, complex regional pain syndrome, weakness, pulley injury, tendon injury, bowstringing, partial and/or incomplete relief of symptoms, persistence of and/or worsening of symptoms, hardware and/or surgical failure, prominent and/or symptomatic hardware possibly necessitating future removal, osteomyelitis, amputation, loss of function, stiffness, rotational malalignment, functional debility, dysfunction, decreased  strength, need for prolonged postoperative rehabilitation, malunion, nonunion, deep venous thrombosis, pulmonary embolism, arthritis and death.  The patient states an understanding and wishes to proceed with surgery.   All questions were answered.  No guarantees were implied or stated.  Written informed consent was obtained.     Procedure in detail:    On the date of the operative intervention, the patient was evaluated in the preoperative holding area.  With their participation the right upper extremity was marked at the operative site.   The patient was then administered regional anesthesia and taken to the operating room where they were placed on OR table.  The right upper extremity extremity was then placed on a hand table with a nonsterile tourniquet placed high on the patient's right upper extremity although this was never inflated.   The right upper extremity extremity was then prepped and draped in the usual sterile fashion and time-out was taken and confirmed by all present to confirm the correct patient site procedure and administration of preoperative antibiotics if ordered.  All were in agreement so I proceeded.  Anesthesia was introduced.  Finger tourniquet was applied to the right long finger.  I then examined the finger under anesthesia.  There was some significant gross contamination and foreign debris which I debrided from the wounds.  After doing so, it was quite evident that there was exposed and some loss of bone to both the right long finger middle and distal phalanges.  There were 2 full-thickness wounds involving the dorsal aspect of the right long finger distal phalanx base as well as more distally involving the nail plate and sterile matrix.  Upon supinated the forearm, I also identified that there was essentially a nearly circumferential constrictive eschar about the right long finger D DIP flexion crease.  I also noticed this prior to applying the finger tourniquet and although the finger was warm and perfused with brisk capillary refill, this eschar did appear to be causing some venous congestion thus, at this point in time I performed an eschar ostomy of this region to the volar pulp of the right long finger.  This relaxed the soft tissues nicely.  Having performed this I once again pronated the forearm to examined the dorsal aspect of the right long finger were most of the zone of injury was.  Mosquito hemostat was utilized to remove the nail plate to the right long finger.  The traumatic laceration was extended proximally to the level of the mid middle phalanx to the right long finger.  Full-thickness flaps were developed.  I identified the terminal tendon which was disrupted with some gap formation at its insertion with complete discontinuity.  Attention was then turned towards debridement and irrigation of the portions  of nonviable skin subcutaneous tissue tendon and bone were sharply debrided with a rongeur and discarded.  Several L of sterile saline were then run through the wounds with cysto tubing to gravity.  Next, I placed a retrograde 0.045 Karla wire through the tip of the right long finger and into the distal phalanx then extending proximally and traversing the D IP joint into the base of the middle phalanx for stability of the D IP joint in extension to prepare this for our extensor tendon repair.  Next,  I then turned my attention towards repair of the zone an Arthrex micro corkscrew anchor was placed at the anatomic insertion of the terminal tendon finger distal phalanx.  The attached FiberWire was utilized to [place] up and down the terminal tendon and prepared for reinsertion to its footprint.  Due to some loss of the tendon substance I performed some relaxing and lengthening incisions more proximally in the tendon.  This accomplished the goal quite nicely and the tendon was then reinserted back down to its footprint with the anchor and FiberWire suture with good apposition.  Now, having completed my eschar ostomy, extent, debridement and irrigation I my D IP extension pinning, and nail plate removal, I turned my attention towards the residual soft tissue defect.  There were 2 distinct areas of soft tissue loss 1 more proximally the dorsal D IP joint and 1 slightly more distally in the sterile matrix.  Bilayer drinks was opened and applied to these areas with excellent coverage.  The Integra was then sutured in place with 4-0 chromic suture.  At this point in time I procedures were complete.  The indwelling Karla wire was then bent and clipped in the usual sterile fashion.  I applied bolster dressings to the Integra sites and then further dressed the wound with Xeroform 4 x 4 gauze splint.  Finger tourniquet was removed prior to dressing application and brisk capillary refill in [place] throughout the right  long finger.  There was no significant bleeding.  The patient was then awakened from anesthesia return the post anesthesia care in stable condition.  There were no complications as attending surgeon was present performed the critical portions of procedure.      Postoperative plan for the patient:  The patient be discharged home in stable condition.  Urge occupational therapy for a custom D IP extension orthosis.  We will determine the need for skin grafting over her Integra versus allowing this to granulate in and reepithelialized.  Follow-up in 2 weeks for wound check and re-evaluation of the postop plan.    Please be aware that this note has been generated with the assistance of [employer] voice-to-text.  Please excuse any spelling or grammatical errors.

## 2023-06-24 ENCOUNTER — NURSE TRIAGE (OUTPATIENT)
Dept: ADMINISTRATIVE | Facility: CLINIC | Age: 33
End: 2023-06-24
Payer: MEDICAID

## 2023-06-24 RX ORDER — METHOCARBAMOL 750 MG/1
750 TABLET, FILM COATED ORAL 3 TIMES DAILY
Qty: 21 TABLET | Refills: 0 | Status: SHIPPED | OUTPATIENT
Start: 2023-06-24 | End: 2023-07-01

## 2023-06-24 RX ORDER — OXYCODONE HYDROCHLORIDE 5 MG/1
5 TABLET ORAL EVERY 6 HOURS PRN
Qty: 10 TABLET | Refills: 0 | Status: SHIPPED | OUTPATIENT
Start: 2023-06-24 | End: 2023-07-07 | Stop reason: SDUPTHER

## 2023-06-24 RX ORDER — ACETAMINOPHEN 500 MG
500 TABLET ORAL EVERY 6 HOURS PRN
Qty: 20 TABLET | Refills: 0 | Status: SHIPPED | OUTPATIENT
Start: 2023-06-24

## 2023-06-24 RX ORDER — GABAPENTIN 300 MG/1
300 CAPSULE ORAL 3 TIMES DAILY
Qty: 21 CAPSULE | Refills: 0 | Status: SHIPPED | OUTPATIENT
Start: 2023-06-24 | End: 2023-07-01

## 2023-06-24 RX ORDER — IBUPROFEN 800 MG/1
800 TABLET ORAL 3 TIMES DAILY
Qty: 42 TABLET | Refills: 0 | Status: SHIPPED | OUTPATIENT
Start: 2023-06-24 | End: 2023-07-07 | Stop reason: SDUPTHER

## 2023-06-24 NOTE — ANESTHESIA POSTPROCEDURE EVALUATION
Anesthesia Post Evaluation    Patient: Javier Anderson    Procedure(s) Performed: Procedure(s) (LRB):  REPAIR, TENDON, FINGER - Right Long finger I&D, poss tendon repair, poss skin graft vs integra (Right)    Final Anesthesia Type: general      Patient location during evaluation: PACU  Patient participation: Yes- Able to Participate  Level of consciousness: awake and alert and oriented  Post-procedure vital signs: reviewed and stable  Pain management: adequate  Airway patency: patent  BOUBACAR mitigation strategies: Multimodal analgesia  PONV status at discharge: No PONV  Anesthetic complications: no      Cardiovascular status: blood pressure returned to baseline and hemodynamically stable  Respiratory status: unassisted, spontaneous ventilation and room air  Hydration status: euvolemic  Follow-up not needed.          Vitals Value Taken Time   /70 06/23/23 1502   Temp 36.7 °C (98 °F) 06/23/23 1445   Pulse 64 06/23/23 1520   Resp 33 06/23/23 1518   SpO2 98 % 06/23/23 1520   Vitals shown include unvalidated device data.      Event Time   Out of Recovery 15:15:00         Pain/Lucía Score: Pain Rating Prior to Med Admin: 2 (6/23/2023 12:20 PM)  Lucía Score: 9 (6/23/2023  2:45 PM)

## 2023-06-24 NOTE — TELEPHONE ENCOUNTER
"Javier is 1 day post right finger tendon repair with Dr. Johnson at [place]. Pt currently c/o pain unrelieved by currently prescribed Percocet 5/325 mg. Pt states she has been having to take 2 Percocets every 2 hours for pain relief since last night. Advised per triage protocol that on call provider will be paged now and informed caller of brief hold. V/u.     Spoke to Jermain Odell Orthopedist resident on call. Per JAMIE Odell's verbal advice "pt should not be taking the Percocet every 2 hours. About to scrub in. Please send me a secure chat with pt info and requested pharmacy and I will send something in."     Update provided to Javier and instructed to only take medications as prescribed. Instructed to contact pharmacy within next 1 hour for p/u time and to call back if further questions Pt v/u. Javier now reporting vaginal bleeding that she states is not from her cycle. Triage offered but declined. Pt states she plans to have boyfriend drive her to the ER.      Reason for Disposition   [1] SEVERE post-op pain (e.g., excruciating, pain scale 8-10) AND [2] not controlled with pain medications    Additional Information   Negative: Sounds like a life-threatening emergency to the triager   Negative: [1] Widespread rash AND [2] bright red, sunburn-like   Negative: [1] SEVERE headache AND [2] after spinal (epidural) anesthesia   Negative: [1] Vomiting AND [2] persists > 4 hours   Negative: [1] Vomiting AND [2] abdomen looks much more swollen than usual   Negative: [1] Drinking very little AND [2] dehydration suspected (e.g., no urine > 12 hours, very dry mouth, very lightheaded)   Negative: Patient sounds very sick or weak to the triager   Negative: Sounds like a serious complication to the triager   Negative: Fever > 100.4 F (38.0 C)    Protocols used: Post-Op Symptoms and Vtvuzggic-B-DV    "

## 2023-07-07 ENCOUNTER — OFFICE VISIT (OUTPATIENT)
Dept: ORTHOPEDICS | Facility: CLINIC | Age: 33
End: 2023-07-07
Payer: MEDICAID

## 2023-07-07 DIAGNOSIS — S61.209A OPEN CRUSHING INJURY OF FINGER: Primary | ICD-10-CM

## 2023-07-07 DIAGNOSIS — S67.10XA OPEN CRUSHING INJURY OF FINGER: Primary | ICD-10-CM

## 2023-07-07 DIAGNOSIS — Z98.890 POSTOPERATIVE STATE: ICD-10-CM

## 2023-07-07 PROCEDURE — 1159F PR MEDICATION LIST DOCUMENTED IN MEDICAL RECORD: ICD-10-PCS | Mod: CPTII,,, | Performed by: PHYSICIAN ASSISTANT

## 2023-07-07 PROCEDURE — 1159F MED LIST DOCD IN RCRD: CPT | Mod: CPTII,,, | Performed by: PHYSICIAN ASSISTANT

## 2023-07-07 PROCEDURE — 99024 PR POST-OP FOLLOW-UP VISIT: ICD-10-PCS | Mod: ,,, | Performed by: PHYSICIAN ASSISTANT

## 2023-07-07 PROCEDURE — 99024 POSTOP FOLLOW-UP VISIT: CPT | Mod: ,,, | Performed by: PHYSICIAN ASSISTANT

## 2023-07-07 PROCEDURE — 99999 PR PBB SHADOW E&M-EST. PATIENT-LVL II: CPT | Mod: PBBFAC,,, | Performed by: PHYSICIAN ASSISTANT

## 2023-07-07 PROCEDURE — 99999 PR PBB SHADOW E&M-EST. PATIENT-LVL II: ICD-10-PCS | Mod: PBBFAC,,, | Performed by: PHYSICIAN ASSISTANT

## 2023-07-07 PROCEDURE — 99212 OFFICE O/P EST SF 10 MIN: CPT | Mod: PBBFAC | Performed by: PHYSICIAN ASSISTANT

## 2023-07-07 RX ORDER — IBUPROFEN 800 MG/1
800 TABLET ORAL 3 TIMES DAILY
Qty: 42 TABLET | Refills: 0 | Status: SHIPPED | OUTPATIENT
Start: 2023-07-07 | End: 2023-07-21

## 2023-07-07 RX ORDER — OXYCODONE HYDROCHLORIDE 5 MG/1
5 TABLET ORAL EVERY 6 HOURS PRN
Qty: 15 TABLET | Refills: 0 | Status: SHIPPED | OUTPATIENT
Start: 2023-07-07

## 2023-07-07 NOTE — PROCEDURES
Right long finger digital nerve block    Date/Time: 7/7/2023 11:00 AM  Performed by: Jamie L. Russo-Digeorge, PA-C  Authorized by: Jamie L. Russo-Digeorge, PA-C     Consent Done?:  Yes (Verbal)  Indications:  Pain  Site marked: the procedure site was marked    Timeout: prior to procedure the correct patient, procedure, and site was verified    Prep: patient was prepped and draped in usual sterile fashion      Local anesthesia used?: Yes    Anesthesia:  Digital block  Local anesthetic:  Lidocaine 1% without epinephrine (Topical spray prior to injection and 1cc 1% plain lidocaine in the injectate)  Location:  Index finger  Ultrasonic guidance for needle placement?: No    Needle size:  25 G  Approach:  Volar  Patient tolerance:  Patient tolerated the procedure well with no immediate complications

## 2023-07-07 NOTE — PROGRESS NOTES
Dr. Johnson is the supervising physician for this encounter/patient    Javier Anderson presents for post-operative evaluation.  The patient is now 2 weeks s/p below procedures with Dr. Johnson on 6/23/23.    PROCEDURE(S) PERFORMED:    Irrigation and sharp excisional debridement right long finger with debridement of portions of nonviable skin, subcutaneous tissue, tendon, and bone  Removal of foreign material right long finger open wounds  Extensor tendon repair in zone 1 right long finger  Escharotomy right long finger  Transarticular extension pinning of the DIP joint of the right long finger  Placement of Integra skin substitute to the right long finger, 2 distinct wounds  Nail plate removal right long finger    Overall the patient reports doing well.  She reports 9/10 pain, denies any f/c/s. She has maintained surgical splint, she is not scheduled with OT. She is requesting a refill of the ibuprofen and oxycodone.    PE:    AA&O x 4.  NAD  HEENT:  NCAT, sclera nonicteric  Lungs:  Respirations are equal and unlabored.  CV:  2+ bilateral upper and lower extremity pulses.  MSK: The wound is healing well with no signs of erythema or warmth.  There is no drainage.  No clinical signs or symptoms of infection are present.  Integra in place without issues. SILT. DNVI.    A/P: Status post above, doing well  1) Digital block performed today for dressing, chromic sutures left in place. She is placed in a xeroform dressing today. Oxycodone and Ibuprofen refilled.  2) Schedule with OT asap for custom orthosis and to initial postop rehab.  3) F/U 1-2 weeks with Dr. Johnson to discuss further postop plan. Xrays of the finger.  4) Call with any questions/concerns in the interim      Jamie Russo-DiGeorge PA-C

## 2023-07-24 ENCOUNTER — PATIENT MESSAGE (OUTPATIENT)
Dept: ORTHOPEDICS | Facility: CLINIC | Age: 33
End: 2023-07-24
Payer: MEDICAID

## 2023-07-24 DIAGNOSIS — M79.644 FINGER PAIN, RIGHT: Primary | ICD-10-CM

## 2023-07-25 ENCOUNTER — HOSPITAL ENCOUNTER (OUTPATIENT)
Dept: RADIOLOGY | Facility: HOSPITAL | Age: 33
Discharge: HOME | End: 2023-07-25
Attending: ORTHOPAEDIC SURGERY
Payer: OTHER GOVERNMENT

## 2023-07-25 ENCOUNTER — OFFICE VISIT (OUTPATIENT)
Dept: ORTHOPEDICS | Facility: CLINIC | Age: 33
End: 2023-07-25
Payer: OTHER GOVERNMENT

## 2023-07-25 VITALS — WEIGHT: 194 LBS | HEIGHT: 68 IN | BODY MASS INDEX: 29.4 KG/M2

## 2023-07-25 DIAGNOSIS — S67.10XA OPEN CRUSHING INJURY OF FINGER: Primary | ICD-10-CM

## 2023-07-25 DIAGNOSIS — M79.644 FINGER PAIN, RIGHT: ICD-10-CM

## 2023-07-25 DIAGNOSIS — S61.209A OPEN CRUSHING INJURY OF FINGER: Primary | ICD-10-CM

## 2023-07-25 PROCEDURE — 99024 PR POST-OP FOLLOW-UP VISIT: ICD-10-PCS | Mod: S$PBB,,, | Performed by: ORTHOPAEDIC SURGERY

## 2023-07-25 PROCEDURE — 73140 X-RAY EXAM OF FINGER(S): CPT | Mod: TC,RT

## 2023-07-25 PROCEDURE — 99999 PR PBB SHADOW E&M-EST. PATIENT-LVL II: ICD-10-PCS | Mod: PBBFAC,,, | Performed by: ORTHOPAEDIC SURGERY

## 2023-07-25 PROCEDURE — 73140 XR FINGER 2 OR MORE VIEWS RIGHT: ICD-10-PCS | Mod: 26,RT,, | Performed by: RADIOLOGY

## 2023-07-25 PROCEDURE — 99024 POSTOP FOLLOW-UP VISIT: CPT | Mod: S$PBB,,, | Performed by: ORTHOPAEDIC SURGERY

## 2023-07-25 PROCEDURE — 99999 PR PBB SHADOW E&M-EST. PATIENT-LVL II: CPT | Mod: PBBFAC,,, | Performed by: ORTHOPAEDIC SURGERY

## 2023-07-25 PROCEDURE — 73140 X-RAY EXAM OF FINGER(S): CPT | Mod: 26,RT,, | Performed by: RADIOLOGY

## 2023-07-25 RX ORDER — OXYCODONE AND ACETAMINOPHEN 5; 325 MG/1; MG/1
1 TABLET ORAL EVERY 4 HOURS PRN
Qty: 28 TABLET | Refills: 0 | Status: SHIPPED | OUTPATIENT
Start: 2023-07-25

## 2023-07-25 RX ORDER — NALOXONE HYDROCHLORIDE 4 MG/.1ML
SPRAY NASAL
Qty: 1 EACH | Refills: 11 | Status: SHIPPED | OUTPATIENT
Start: 2023-07-25

## 2023-07-25 NOTE — PROGRESS NOTES
Dr. Johnson is the supervising physician for this encounter/patient    Javier Anderson presents for post-operative evaluation.  The patient is now over 4 weeks s/p below procedures with Dr. Johnson on 6/23/23.    PROCEDURE(S) PERFORMED:    Irrigation and sharp excisional debridement right long finger with debridement of portions of nonviable skin, subcutaneous tissue, tendon, and bone  Removal of foreign material right long finger open wounds  Extensor tendon repair in zone 1 right long finger  Escharotomy right long finger  Transarticular extension pinning of the DIP joint of the right long finger  Placement of Integra skin substitute to the right long finger, 2 distinct wounds  Nail plate removal right long finger    Overall the patient reports doing well.  She reports 9/10 pain, denies any f/c/s. She is still not scheduled with OT. No c/o.    PE:    AA&O x 4.  NAD  HEENT:  NCAT, sclera nonicteric  Lungs:  Respirations are equal and unlabored.  CV:  2+ bilateral upper and lower extremity pulses.  MSK: The wound is healing well with no signs of erythema or warmth.  There is no drainage.  No clinical signs or symptoms of infection are present.  Integra in place and granulating nicely. SILT. DNVI.  Pin intact.    A/P: Status post above, doing well  1) Pin pulled today.  Silicone layer removed easily.  Good granulation bed, starting to epithelialize.  No infection.  OT message send to get in urgently for splint and initiate ROM.  F/U 4-6 weeks for reevaluatino or sooner for problems.

## 2023-07-27 ENCOUNTER — CLINICAL SUPPORT (OUTPATIENT)
Dept: REHABILITATION | Facility: HOSPITAL | Age: 33
End: 2023-07-27
Payer: MEDICAID

## 2023-07-27 DIAGNOSIS — T14.90XD HEALING WOUND: ICD-10-CM

## 2023-07-27 DIAGNOSIS — M25.60 RANGE OF MOTION DEFICIT: ICD-10-CM

## 2023-07-27 DIAGNOSIS — S67.10XA OPEN CRUSHING INJURY OF FINGER: ICD-10-CM

## 2023-07-27 DIAGNOSIS — M79.644 PAIN OF FINGER OF RIGHT HAND: ICD-10-CM

## 2023-07-27 DIAGNOSIS — S61.209A OPEN CRUSHING INJURY OF FINGER: ICD-10-CM

## 2023-07-27 PROCEDURE — 97166 OT EVAL MOD COMPLEX 45 MIN: CPT

## 2023-07-27 PROCEDURE — L3933 FO W/O JOINTS CF: HCPCS

## 2023-07-27 NOTE — PATIENT INSTRUCTIONS
[facility], OCCUPATIONAL THERAPY  HOME EXERCISE PROGRAM          Complete 10 repetitions of each exercise 4-6 times a day:                          _

## 2023-07-27 NOTE — PLAN OF CARE
[facility]  Occupational Therapy Initial Evaluation     Name: Javier Anderson  Clinic Number: [id number]    Therapy Diagnosis:   Encounter Diagnoses   Name Primary?    Open crushing injury of finger     Pain of finger of right hand     Healing wound     Range of motion deficit      Physician: Russo-Digeorge, Jamie L*; Dr. Ruel Johnson    Physician Orders: Eval and Treat  Medical Diagnosis: Open crushing injury of finger [S67.10XA, S61.209A]  Surgical Procedure and Date: 6/23/23,   Irrigation and sharp excisional debridement right long finger with debridement of portions of nonviable skin, subcutaneous tissue, tendon, and bone  Removal of foreign material right long finger open wounds  Extensor tendon repair in zone 1 right long finger  Escharotomy right long finger  Transarticular extension pinning of the DIP joint of the right long finger  Placement of Integra skin substitute to the right long finger, 2 distinct wounds  Nail plate removal right long finger    Onset Date: 6/22/23  Evaluation Date: 7/27/2023  Insurance Authorization Period Expiration: 6/21/24  Plan of Care Certification Period: 10/6/23  Date of Return to MD: 8/22/23  Visit # / Visits authorized: 1 / 1  FOTO intake score: 1/3, 7/27/23  29%    Precautions:  Standard, Weightbearing, and no motion of DIP and healing wound    Time In:10:15  Time Out: 11:05  Total Appointment Time (timed & untimed codes): 50 minutes    Subjective         History of Current Condition/Mechanism of Injury: Javier reports: finger got caught in a sorting machine at work. She c/o pain today. She arrived in wrap from ortho office.     Falls: none    Involved Side: right  Dominant Side: Right    Mechanism of Injury: traumatic, caught in machine at work  Surgical Procedure: see above  Imaging: x-ray on 7/25/23 indicated: Since the previous examination, Hall pin well as fixation screw has been placed within the 3rd digit.  No dislocation.  There is edema  about the digit.  No radiopaque foreign body.    Prior Therapy: none    Pain:  Functional Pain Scale Rating 0-10:   8/10 on average  0/10 at best  10/10 at worst  Location: R LF and sometimes her other fingers as well  Description: Burning, Throbbing, Sharp, and Shooting  Aggravating Factors: if it gets hit by the baby  Easing Factors: pain medication    Occupation:  works for the [employer], she is a   Working presently: employed, but is on leave  Duties: use of hands, picks up trays, pushes equipment, stacking mail, handles boxes    Functional Limitations/Social History:    Previous functional status includes: Independent with all ADLs.      Current Functional Status   Home/Living environment: lives with their family. Pt has 4 kids, one of them is 1 year old. Gets occasional assist from older kids          Limitation of Functional Status as follows:   ADLs/IADLs:     - Feeding: using L hand to feed self    - Bathing: getting assist    - Dressing/Grooming: getting assist from her boyfriend    - Home Management: getting assist with cooking and cleaning, currently unable    - Driving: not yet back to driving    -getting assist from her boyfriend when he is there. Has difficulty with picking up her baby and changing diapers.     -unable to  or lift with that hand     Leisure: enjoys playing with her kids with a football or playing outside, but is unable to do that    Patient's Goals for Therapy: get back to using her hand.     Past Medical History/Physical Systems Review:   Javier Anderson  has a past medical history of COVID-19.    Javier Anderson  has a past surgical history that includes Eye surgery; Dilation and curettage of uterus; and Finger tendon repair (Right, 6/23/2023).    Javier has a current medication list which includes the following prescription(s): acetaminophen, amoxicillin, gabapentin, medroxyprogesterone, naloxone, oxycodone, oxycodone-acetaminophen, promethazine hcl,  promethazine-dextromethorphan, and tramadol, and the following Facility-Administered Medications: fentanyl, haloperidol lactate, methocarbamol, midazolam, ondansetron, and sodium chloride 0.9%.    Review of patient's allergies indicates:  No Known Allergies     Objective     Observation/Appearance:  arrived with gauze wrap, coban, splint, and xeroform. She presents with dorsal finger open wounds.       Edema. Measured in centimeters.   7/27/2023 7/27/2023    Right  Left    Long:       P1 6.8     PIP 6.8    P2 6.3     DIP 5.7    P3 5.5      Elbow and Wrist ROM. Measured in degrees.   7/31/2023 7/31/2023    Right Left   Elbow Ext/Flex     Supination/Pronation     Wrist Ext/Flex     Wrist RD/UD         Hand ROM. Measured in degrees.  ROM grossly assessed and to be more formally assessed at next visit.  R LF AROM ~25% of normal motion, other digits ~50% of normal.    7/27/2023    Right        Index: MP                PIP                   DIP               CRUZ TBA       Long:  MP               PIP               DIP               CRUZ TBA       Ring:   MP               PIP               DIP               CRUZ TBA       Small:  MP                PIP                DIP               CRUZ TBA       Thumb: MP                 IP        Rad ADD/ABD        Pal ADD/ABD           Opposition      Sensation: reports occasional numbness  Distribution 7/27/2023 7/27/2023    Right  Left    [place]     Normal 1.65-2.83     Diminished Light Touch 3.22-3.61     Diminished Protective 3.84-4.31     Loss of Protective 4.56-6.65     Untestable >6.65     2 Point Discrimination     Static     Dynamic          Strength (Dyanmometer) and Pinch Strength (Pinch Gauge)  Measured in pounds and psi. Average of three trials.   7/27/2023 7/27/2023    Right  Left    Rung II deferred deferred   Key Pinch     3pt Pinch     2pt Pinch         Manual Muscle Testing    Manual Muscle Test:NT   7/31/2023 7/31/2023    Left Right   Wrist Extension       Wrist Flexion     Radial Deviation     Ulnar Deviation     Supination     Pronation     Elbow Extension     Elbow Flexion               CMS Impairment/Limitation/Restriction for FOTO Hand Survey    FOTO scores for Javier Anderson on 7/27/2023.   FOTO documents entered into Smart GPS Backpack - see Media section.    Intake Score: 29%           Treatment     Total Treatment time separate from Evaluation time:20 minutes    Javier received the treatments listed below:      Fabricated a volar static finger extension gutter orthosis to right LF to protect recent surgery.  Instructed to wear full time with removal for HEP, bathing/hygiene.  Instructed to monitor for pain/pressure, increased edema, or redness and to contact office for adjustments as needed. Patient reported good understanding of orthotic wear and care schedule. She was able to heriberto and doff orthosis. ()    Redressed finger with xeroform, gauze, and elastic gauze wrap.      Patient Education and Home Exercises      Education provided:   -role of OT, goals for OT, scheduling/cancellations, insurance limitations with patient.  -Additional Education provided:   -educated on wound care, orthosis wear full time, and gentle HEP including TGE (hook and full fist deferred), and PIP joint blocking    Written Home Exercises Provided: yes.  Exercises were reviewed and Javier was able to demonstrate them prior to the end of the session.    Javier demonstrated good  understanding of the education provided.     Pt was advised to perform these exercises free of pain, and to stop performing them if pain occurs.    See EMR under Patient Instructions for exercises provided 7/27/2023.    Assessment     Javier Anderson is a 33 y.o. female referred to outpatient occupational therapy and presents with a medical diagnosis of crush injury to right LF.    Following medical record review it is determined that pt will benefit from occupational therapy services in order to maximize pain free  and/or functional use of right hand. The following goals were discussed with the patient and patient is in agreement with them as to be addressed in the treatment plan. The patient's rehab potential is Good.     Anticipated barriers to occupational therapy: severity of injury    Plan of care discussed with patient: Yes  Patient's spiritual, cultural and educational needs considered and patient is agreeable to the plan of care and goals as stated below:     Medical Necessity is demonstrated by the following  Occupational Profile/History  Co-morbidities and personal factors that may impact the plan of care [] LOW: Brief chart review  [x] MODERATE: Expanded chart review   [] HIGH: Extensive chart review    Moderate / High Support Documentation: moderate chart review     Examination  Performance deficits relating to physical, cognitive or psychosocial skills that result in activity limitations and/or participation restrictions  [] LOW: addressing 1-3 Performance deficits  [] MODERATE: 3-5 Performance deficits  [x] HIGH: 5+ Performance deficits (please support below)    Moderate / High Support Documentation:    Physical:  Joint Mobility  Joint Stability  Muscle Power/Strength  Muscle Endurance  Skin Integrity/Scar Formation  Edema   Strength  Pinch Strength  Fine Motor Coordination  Pain    Cognitive:  No Deficits    Psychosocial:    Habits  Routines     Treatment Options [] LOW: Limited options  [] MODERATE: Several options  [x] HIGH: Multiple options     min assistance required     Decision Making/ Complexity Score: moderate       The following goals were discussed with the patient and patient is in agreement with them as to be addressed in the treatment plan.     Goals:   Long Term Goals (LTGs); to be met by discharge.  1) Pt will report pain no higher than 1/10 with ADLs, job duties, caring for her child, and house hold tasks  2) Pt will have an improved FOTO score by at least 20 points  3) Pt will demonstrate  improved right LF AROM WFL for performing daily tasks and grasping  4)  strength to be assessed when appropriate and set goals  5) Pt will report return to prior level of function      Short Term Goals (STGs); to be met within 4 weeks (8/27/23).  1) Pt will report or demonstrate independence with HEP and orthosis wear  2) Pt will report pain no higher than 4/10 with ADLs  3) Formally assess right LF AROM next session  4) Pt will report independence with dressing tasks  5) Pt will demonstrate improved edema in right LF by at least 0.3 cm      Plan   Certification Period/Plan of care expiration: 7/27/2023 to 10/6/23.    Outpatient Occupational Therapy 2-3 times weekly for 10 weeks to include the following interventions: Paraffin, Fluidotherapy, Manual therapy/joint mobilizations, Modalities for pain management, US 3 mhz, Therapeutic exercises/activities., Strengthening, Orthotic Fabrication/Fit/Training, Edema Control, Scar Management, Wound Care, Joint Protection, and Energy Conservation. Pt reports she must miss therapy next week due to being out of town.     RAFAT Pérez, T      I certify the need for these services furnished under the plan of treatment and while under my care.     ____________________________________________________________________  Physician/Referring Practitioner   Date of Signature

## 2023-07-31 PROBLEM — T14.90XD HEALING WOUND: Status: ACTIVE | Noted: 2023-07-31

## 2023-07-31 PROBLEM — M25.60 RANGE OF MOTION DEFICIT: Status: ACTIVE | Noted: 2023-07-31

## 2023-07-31 PROBLEM — M79.644 PAIN OF FINGER OF RIGHT HAND: Status: ACTIVE | Noted: 2023-07-31

## 2023-08-21 ENCOUNTER — PATIENT MESSAGE (OUTPATIENT)
Dept: ORTHOPEDICS | Facility: CLINIC | Age: 33
End: 2023-08-21
Payer: MEDICAID

## 2023-08-21 DIAGNOSIS — M79.644 FINGER PAIN, RIGHT: Primary | ICD-10-CM

## 2023-08-22 ENCOUNTER — HOSPITAL ENCOUNTER (OUTPATIENT)
Dept: RADIOLOGY | Facility: HOSPITAL | Age: 33
Discharge: HOME | End: 2023-08-22
Attending: ORTHOPAEDIC SURGERY
Payer: OTHER GOVERNMENT

## 2023-08-22 ENCOUNTER — OFFICE VISIT (OUTPATIENT)
Dept: ORTHOPEDICS | Facility: CLINIC | Age: 33
End: 2023-08-22
Payer: OTHER GOVERNMENT

## 2023-08-22 VITALS — BODY MASS INDEX: 29.4 KG/M2 | WEIGHT: 194 LBS | HEIGHT: 68 IN

## 2023-08-22 DIAGNOSIS — S62.632B OPEN DISPLACED FRACTURE OF DISTAL PHALANX OF RIGHT MIDDLE FINGER, INITIAL ENCOUNTER: Primary | ICD-10-CM

## 2023-08-22 DIAGNOSIS — Z98.890 POSTOPERATIVE STATE: ICD-10-CM

## 2023-08-22 DIAGNOSIS — M79.644 FINGER PAIN, RIGHT: ICD-10-CM

## 2023-08-22 DIAGNOSIS — S61.209A OPEN CRUSHING INJURY OF FINGER: ICD-10-CM

## 2023-08-22 DIAGNOSIS — S67.10XA OPEN CRUSHING INJURY OF FINGER: ICD-10-CM

## 2023-08-22 DIAGNOSIS — S62.632D OPEN DISPLACED FRACTURE OF DISTAL PHALANX OF RIGHT MIDDLE FINGER WITH ROUTINE HEALING, SUBSEQUENT ENCOUNTER: Primary | ICD-10-CM

## 2023-08-22 PROCEDURE — 99999 PR PBB SHADOW E&M-EST. PATIENT-LVL III: ICD-10-PCS | Mod: PBBFAC,,, | Performed by: ORTHOPAEDIC SURGERY

## 2023-08-22 PROCEDURE — 73140 X-RAY EXAM OF FINGER(S): CPT | Mod: TC,RT

## 2023-08-22 PROCEDURE — 99999 PR PBB SHADOW E&M-EST. PATIENT-LVL III: CPT | Mod: PBBFAC,,, | Performed by: ORTHOPAEDIC SURGERY

## 2023-08-22 PROCEDURE — 99024 PR POST-OP FOLLOW-UP VISIT: ICD-10-PCS | Mod: S$GLB,,, | Performed by: ORTHOPAEDIC SURGERY

## 2023-08-22 PROCEDURE — 73140 X-RAY EXAM OF FINGER(S): CPT | Mod: 26,RT,, | Performed by: RADIOLOGY

## 2023-08-22 PROCEDURE — 99024 POSTOP FOLLOW-UP VISIT: CPT | Mod: S$GLB,,, | Performed by: ORTHOPAEDIC SURGERY

## 2023-08-22 PROCEDURE — 73140 XR FINGER 2 OR MORE VIEWS RIGHT: ICD-10-PCS | Mod: 26,RT,, | Performed by: RADIOLOGY

## 2023-08-22 RX ORDER — IBUPROFEN 800 MG/1
800 TABLET ORAL 3 TIMES DAILY
Qty: 90 TABLET | Refills: 2 | Status: SHIPPED | OUTPATIENT
Start: 2023-08-22 | End: 2023-11-28 | Stop reason: SDUPTHER

## 2023-08-22 RX ORDER — TRAMADOL HYDROCHLORIDE 50 MG/1
50 TABLET ORAL EVERY 6 HOURS
Qty: 28 TABLET | Refills: 0 | Status: SHIPPED | OUTPATIENT
Start: 2023-08-22 | End: 2023-09-14 | Stop reason: SDUPTHER

## 2023-08-22 NOTE — PROGRESS NOTES
Dr. Johnson is the supervising physician for this encounter/patient    Javier Anderson presents for post-operative evaluation.  The patient is now 2 mos s/p below procedures with Dr. Johnson on 6/23/23.      PROCEDURE(S) PERFORMED:    Irrigation and sharp excisional debridement right long finger with debridement of portions of nonviable skin, subcutaneous tissue, tendon, and bone  Removal of foreign material right long finger open wounds  Extensor tendon repair in zone 1 right long finger  Escharotomy right long finger  Transarticular extension pinning of the DIP joint of the right long finger  Placement of Integra skin substitute to the right long finger, 2 distinct wounds  Nail plate removal right long finger    She is not doing well.  She notes that her WC carrier has cancelled all her OT appointments and she has not been attending or working the hand.  No other complaints.  Pain 8/10.  Even her nonsurgical fingers are very stiff today.  Minimal range of motion.    PE:    AA&O x 4.  NAD  HEENT:  NCAT, sclera nonicteric  Lungs:  Respirations are equal and unlabored.  CV:  2+ bilateral upper and lower extremity pulses.  MSK: The wound is healing well with no signs of erythema or warmth.  There is no drainage.  No clinical signs or symptoms of infection are present.  Eschar present over the dorsal DIP. SILT. DNVI.  Pin intact.    A/P: Status post above, doing well  1) at this time, Larisa painter unfortunately has had all of her occupational therapy visits canceled by her workers' compensation carrier.  She provided me with 2 contacts which I have called.  The 1st is Venessa Jain at [phone].  I contacted Ms. Jain and she referred me to the Department of labor at [phone].  I called this number as well and left a message with my personal cell phone number to try and get occupational therapy approved for this patient.  Any further delays will be highly detrimental to her outcome.  It is imperative that she begin  occupational therapy imminently.  Another urgent referral to OT was placed today.  She has a significant eschar overlying the dorsal D IP joint of her right long finger.  I showed her and instructed her on active and passive range of motion exercises to be performed constantly and in an unrestricted fashion to the right hand including the operative finger.  She is guarding somewhat and I have encouraged her to push through some discomfort to optimize her outcome.  I would like for him to return to me in 1 month for re-evaluation or sooner for any problems.  We will remain available for any additional clarification or orders needed by the worker's compensation carrier and otherwise I will await their call on my personal cell phone which again was provided to them.

## 2023-09-07 ENCOUNTER — CLINICAL SUPPORT (OUTPATIENT)
Dept: REHABILITATION | Facility: HOSPITAL | Age: 33
End: 2023-09-07
Payer: OTHER GOVERNMENT

## 2023-09-07 DIAGNOSIS — T14.90XD HEALING WOUND: ICD-10-CM

## 2023-09-07 DIAGNOSIS — S62.632B OPEN DISPLACED FRACTURE OF DISTAL PHALANX OF RIGHT MIDDLE FINGER, INITIAL ENCOUNTER: ICD-10-CM

## 2023-09-07 DIAGNOSIS — M79.644 PAIN OF FINGER OF RIGHT HAND: Primary | ICD-10-CM

## 2023-09-07 DIAGNOSIS — M25.60 RANGE OF MOTION DEFICIT: ICD-10-CM

## 2023-09-07 PROCEDURE — 97763 ORTHC/PROSTC MGMT SBSQ ENC: CPT

## 2023-09-07 PROCEDURE — 97110 THERAPEUTIC EXERCISES: CPT

## 2023-09-07 PROCEDURE — 97140 MANUAL THERAPY 1/> REGIONS: CPT

## 2023-09-07 NOTE — PROGRESS NOTES
[facility]  Occupational Therapy Progress Note & Re-evaluation    Date: 9/7/2023  Name: Javier Anderson  Clinic Number: [id number]    Therapy Diagnosis:   Encounter Diagnoses   Name Primary?    Pain of finger of right hand Yes    Healing wound     Range of motion deficit     Open displaced fracture of distal phalanx of right middle finger, initial encounter      Physician: Ruel Johnson MD    Physician: Russo-Digeorge, Jamie L*; Dr. Ruel Johnson     Physician Orders: Eval and Treat  Medical Diagnosis: Open crushing injury of finger [S67.10XA, S61.209A]  Surgical Procedure and Date: 6/23/23,   Irrigation and sharp excisional debridement right long finger with debridement of portions of nonviable skin, subcutaneous tissue, tendon, and bone  Removal of foreign material right long finger open wounds  Extensor tendon repair in zone 1 right long finger  Escharotomy right long finger  Transarticular extension pinning of the DIP joint of the right long finger  Placement of Integra skin substitute to the right long finger, 2 distinct wounds  Nail plate removal right long finger     Onset Date: 6/22/23  Evaluation Date: 7/27/2023  Insurance Authorization Period Expiration: 6/21/24  Plan of Care Certification Period: 10/6/23  Date of Return to MD: 8/22/23  FOTO intake score: 1/3, 7/27/23  29%      Visit # / Visits authorized: 2 / 24  (# of No Show Appts 0/Number of Cancelled Appts (some appointments cancelled due to awaiting insurance auth)     Precautions:  Standard, Weightbearing, and no motion of DIP and healing wound    Time In: 10:00  Time Out: 11:00  Total Billable Time: 60 minutes      Occupation (including job description if provided): is a  for the [employer]  Job Demands: use of hands, picks up trays, pushes equipment, stacking mail, handles boxes  Current Work Restrictions: she has not yet been cleared to return to work    SUBJECTIVE     Pt reports: she has very limited motion in her LF.  Other fingers have improved as she was doing her finger exercises.   She was partially  compliant with home exercise program given last session.   Response to previous treatment:she has lost the original orthosis. Presents with swan neck  Functional change: unable to use her LF, very limited motion. She has still not been cleared to return to work.    Pain: 9/10  Location: right LF DIP    OBJECTIVE   Objective Measures updated at progress report unless specified.    Edema. Measured in centimeters.    7/27/2023 9/7/23 9/7/23     Right  Left  Right   Long:          P1 6.8 5.8  5.9    PIP 6.8  5.8 6.2   P2 6.3  5.1 5.4    DIP 5.7  4.8 5   P3 5.5  4.3 4.5      Elbow and Wrist ROM. Measured in degrees.    7/31/2023 7/31/2023     Right Left   Elbow Ext/Flex       Supination/Pronation       Wrist Ext/Flex       Wrist RD/UD             Hand ROM. Measured in degrees.  ROM grossly assessed and to be more formally assessed at next visit.  R LF AROM ~25% of normal motion, other digits ~50% of normal.     7/27/2023 9/7/23     Right  Right          Index: MP    80              PIP       78              DIP   10              CRUZ           Long:  MP   84              PIP   H+10/30              DIP   36/40              CRUZ           Ring:   MP   85              PIP   90              DIP   5              CRUZ           Small:  MP   90               PIP   98               DIP   20              CRUZ           Thumb: MP                   IP          Rad ADD/ABD          Pal ADD/ABD             Opposition            Treatment     Javier received the treatments listed below:     Supervised modalities after being cleared for contradictions: Hot Pack   Patient received moist hot pack x 10 min to R hand to increase blood flow, circulation and tissue elasticity prior to therex      Manual therapy techniques: Manual Lymphatic Drainage and Soft tissue Mobilization were applied to the: right LF for 10 minutes,  including:  Gentle soft tissue mobilization to LF  Gentle passive stretch of PIP flexion, and DIP extension    Therapeutic exercises to develop ROM and flexibility for 20 minutes, including:  Reassessed pt objective measurements  Reviewed HEP as follows:  AROM   DIP blocking (NT)  PIP blocking   X 15 reps each    Wave  Hook  straight fist, composite fist finger spreads finger lifts (NT)   X 15 reps each          NT: Therapeutic activities to improve functional performance for 0  minutes, including:    Ortho check and train: x 20 minutes  Fabricated a finger based static dorsal block orthosis to right LF to prevent swan neck positioning and improve DIP extension positioning.  Instructed to wear full time with brief removal for bathing/hygiene. Discussed unstrapping distal straps to perform flexion of PIP with the dorsal block.  Instructed to monitor for pain/pressure, increased edema, or redness and to contact office for adjustments as needed. Patient reported good understanding of orthotic wear and care schedule. Satisfactory fit noted, but may need adjustments due to changes in DIP extension. May need increased flexion at PIP of orthosis.         Patient Education and Home Exercises      Education provided:   - reviewed orthosis wear as above  -reviewed HEP and given new handout  - Progress towards goals     Written Home Exercises Provided: yes.  Exercises were reviewed and Javier was able to demonstrate them prior to the end of the session.  Javier demonstrated good  understanding of the HEP provided. See EMR under Patient Instructions for exercises provided during therapy sessions.      ASSESSMENT      Pt presented for first visit since OT evaluation. Gap in therapy due to delay in worker's comp authorization. She presents with improved motion of IF, RF, and SF. But has significant deficits in LF today, preventing full fist. Significant ext lag noted at DIP. Reviewed HEP and passive stretch of PIP for flexion.  Also fabricated finger based orthosis to improve finger positioning and prevent PIP hyperextension.  Will cont to progress as tolerated and orthosis adjustments as needed.       The patient's current job specific task deficits include the following: unable to us her hand, unable to manipulate things in her hand. Unable to lift or carry things in her hand    Javier is not progressing well towards her goals due to gap in therapy and there are no updates to goals at this time. Pt prognosis is Fair-.     Pt will continue to benefit from skilled outpatient occupational therapy to address the deficits listed in the problem list on initial evaluation, provide pt/family education and to maximize pt's level of independence in the home and community environment.     Pt's spiritual, cultural and educational needs considered and pt agreeable to plan of care and goals.    Anticipated barriers to occupational therapy: severity of injury, gap in therapy due to awaiting authorization from workers comp    Goals:  Long Term Goals (LTGs); to be met by discharge.  1) Pt will report pain no higher than 1/10 with ADLs, job duties, caring for her child, and house hold tasks---progressing  2) Pt will have an improved FOTO score by at least 20 points---progressing  3) Pt will demonstrate improved right LF AROM WFL for performing daily tasks and grasping---progressing  4)  strength to be assessed when appropriate and set goals---progressing  5) Pt will report return to prior level of function---progressing        Short Term Goals (STGs); to be met within 4 weeks (8/27/23).  1) Pt will report or demonstrate independence with HEP and orthosis wear---progressing  2) Pt will report pain no higher than 4/10 with ADLs---progressing  3) Formally assess right LF AROM next session---progressing  4) Pt will report independence with dressing tasks---progressing  5) Pt will demonstrate improved edema in right LF by at least 0.3  cm---progressing    PLAN     Continue skilled occupational therapy with individualized plan of care focusing on maximizing functional use of her hand    Updates/Grading for next session: cont to progress with ROM as able    RAFAT Pérez, YAMILETHT

## 2023-09-12 ENCOUNTER — DOCUMENTATION ONLY (OUTPATIENT)
Dept: REHABILITATION | Facility: HOSPITAL | Age: 33
End: 2023-09-12

## 2023-09-12 NOTE — PROGRESS NOTES
No Show Note/Documentation    Patient: Javier Anderson  Date of Session: 9/12/2023  Diagnosis: No diagnosis found.  MRN: [id number]    Javier Anderson did not attend his/her scheduled therapy appointment today. She did not call to cancel nor reschedule.  Next appointment is scheduled for 9/15 and will follow up with patient at that time. No charges have been posted today.       PALLAVI Alva  9/12/2023

## 2023-09-15 ENCOUNTER — DOCUMENTATION ONLY (OUTPATIENT)
Dept: REHABILITATION | Facility: HOSPITAL | Age: 33
End: 2023-09-15
Payer: OTHER GOVERNMENT

## 2023-09-15 NOTE — PROGRESS NOTES
No Show Note/Documentation    Patient: Javier Anderson  Date of Session: 9/15/2023  Diagnosis: No diagnosis found.  MRN: [id number]    Javier Anderson did not attend his/her scheduled therapy appointment today. She did not call to cancel nor reschedule. This is the 2nd appointment that she has not attended.  No charges have been posted today.       PALLAVI Alva  9/15/2023

## 2023-09-19 ENCOUNTER — DOCUMENTATION ONLY (OUTPATIENT)
Dept: REHABILITATION | Facility: HOSPITAL | Age: 33
End: 2023-09-19
Payer: OTHER GOVERNMENT

## 2023-09-19 DIAGNOSIS — M79.644 FINGER PAIN, RIGHT: Primary | ICD-10-CM

## 2023-09-19 NOTE — PROGRESS NOTES
OT Not Seen Note:    Date: 9/19/23    Attempted calling patient today regarding therapy attendance. However, listed phone number was not in service. Pt has next scheduled appointment on 9/22/23.    RAFAT Pérez CHT

## 2023-09-19 NOTE — PROGRESS NOTES
No Show Note/Documentation    Patient: Javier Anderson  Date of Session: 9/19/2023  Diagnosis: No diagnosis found.  MRN: [id number]    Javier Anderson did not attend his/her scheduled therapy appointment today. She did not call to cancel nor reschedule. This is the 3rd appointment that she has not attended. Next appointment is scheduled for 9/22 and will follow up with patient at that time. No charges have been posted today.       PALLAVI Alva  9/19/2023

## 2023-09-21 ENCOUNTER — OFFICE VISIT (OUTPATIENT)
Dept: ORTHOPEDICS | Facility: CLINIC | Age: 33
End: 2023-09-21
Payer: OTHER GOVERNMENT

## 2023-09-21 ENCOUNTER — HOSPITAL ENCOUNTER (OUTPATIENT)
Dept: RADIOLOGY | Facility: HOSPITAL | Age: 33
Discharge: HOME | End: 2023-09-21
Attending: ORTHOPAEDIC SURGERY
Payer: OTHER GOVERNMENT

## 2023-09-21 DIAGNOSIS — S62.632D OPEN DISPLACED FRACTURE OF DISTAL PHALANX OF RIGHT MIDDLE FINGER WITH ROUTINE HEALING, SUBSEQUENT ENCOUNTER: Primary | ICD-10-CM

## 2023-09-21 DIAGNOSIS — M79.644 FINGER PAIN, RIGHT: ICD-10-CM

## 2023-09-21 PROCEDURE — 99999 PR PBB SHADOW E&M-EST. PATIENT-LVL II: ICD-10-PCS | Mod: PBBFAC,,, | Performed by: ORTHOPAEDIC SURGERY

## 2023-09-21 PROCEDURE — 99999 PR PBB SHADOW E&M-EST. PATIENT-LVL II: CPT | Mod: PBBFAC,,, | Performed by: ORTHOPAEDIC SURGERY

## 2023-09-21 PROCEDURE — 99024 POSTOP FOLLOW-UP VISIT: CPT | Mod: S$GLB,,, | Performed by: ORTHOPAEDIC SURGERY

## 2023-09-21 PROCEDURE — 73140 XR FINGER 2 OR MORE VIEWS RIGHT: ICD-10-PCS | Mod: 26,RT,, | Performed by: RADIOLOGY

## 2023-09-21 PROCEDURE — 73140 X-RAY EXAM OF FINGER(S): CPT | Mod: TC,RT

## 2023-09-21 PROCEDURE — 99024 PR POST-OP FOLLOW-UP VISIT: ICD-10-PCS | Mod: S$GLB,,, | Performed by: ORTHOPAEDIC SURGERY

## 2023-09-21 PROCEDURE — 73140 X-RAY EXAM OF FINGER(S): CPT | Mod: 26,RT,, | Performed by: RADIOLOGY

## 2023-09-21 NOTE — PROGRESS NOTES
Javier Anderson presents for post-operative evaluation.  The patient is s/p below procedures with Dr. Johnson on 6/23/23.      PROCEDURE(S) PERFORMED:    Irrigation and sharp excisional debridement right long finger with debridement of portions of nonviable skin, subcutaneous tissue, tendon, and bone  Removal of foreign material right long finger open wounds  Extensor tendon repair in zone 1 right long finger  Escharotomy right long finger  Transarticular extension pinning of the DIP joint of the right long finger  Placement of Integra skin substitute to the right long finger, 2 distinct wounds  Nail plate removal right long finger    She is not doing well.  She does report that she is finally established with occupational therapy although she has had to miss some visits recently.  The right long finger has assumed a swan-neck posture.  Her soft tissue wound is healed although there is some nail deformity.  No new complaints.  She does note some interval progress since her last visit with the initiation of therapy but certainly she has not fully returned to pre-injury functional status.        PE:    AA&O x 4.  NAD  HEENT:  NCAT, sclera nonicteric  Lungs:  Respirations are equal and unlabored.  CV:  2+ bilateral upper and lower extremity pulses.  MSK: The wound is healing well with no signs of erythema or warmth.  There is no drainage.  No clinical signs or symptoms of infection are present.  Wound healed, nail deformity present. SILT. DNVI.  Right long finger with semi-rigid swan-neck deformity.  Minimal active range motion.  Passively I can get the PIP joint to flex to approximately 45° this is very uncomfortable for the patient.  She can not bring this finger into the palm.    Imaging:  Right long finger with retained suture anchor in the distal phalanx with no evidence of any osseous complications    A/P: Status post above, with swan-neck deformity right long finger  1) at this point in time, we have fortunately  gotten key onto a into therapy although a tendons has been difficult.  She needs aggressive persistent therapy I would like for the focus to shift toward the PIP joint and obtaining/improving flexion ability of the PIP joint both actively and passively.  Message was sent to her occupational therapy team regarding incorporation of a dynamic flexion glove were serial splinting with aggressive stretches.  Certainly surgical interventions may become once again necessary in the future but for now therapy is needed to try and optimize her prior to any additional surgery.  Follow-up in 6 weeks for for re-evaluation or sooner for any problems.

## 2023-09-25 ENCOUNTER — DOCUMENTATION ONLY (OUTPATIENT)
Dept: REHABILITATION | Facility: HOSPITAL | Age: 33
End: 2023-09-25
Payer: OTHER GOVERNMENT

## 2023-09-25 NOTE — PROGRESS NOTES
OT Not Seen Note    Pt did not show again for scheduled OT appointment on 9/22/23. Pt did not cancel or reschedule. No charges dropped.     RAFAT Pérez, YAMILETHT

## 2023-09-26 ENCOUNTER — CLINICAL SUPPORT (OUTPATIENT)
Dept: REHABILITATION | Facility: HOSPITAL | Age: 33
End: 2023-09-26
Payer: OTHER GOVERNMENT

## 2023-09-26 DIAGNOSIS — M25.60 RANGE OF MOTION DEFICIT: ICD-10-CM

## 2023-09-26 DIAGNOSIS — T14.90XD HEALING WOUND: ICD-10-CM

## 2023-09-26 DIAGNOSIS — M79.644 PAIN OF FINGER OF RIGHT HAND: Primary | ICD-10-CM

## 2023-09-26 PROCEDURE — 97110 THERAPEUTIC EXERCISES: CPT | Mod: CO

## 2023-09-26 PROCEDURE — 97140 MANUAL THERAPY 1/> REGIONS: CPT | Mod: CO

## 2023-09-26 NOTE — PROGRESS NOTES
[facility]  Occupational Therapy Progress Note & Re-evaluation    Date: 9/26/2023  Name: Javier Anderson  Clinic Number: [id number]    Therapy Diagnosis:   Encounter Diagnoses   Name Primary?    Pain of finger of right hand Yes    Healing wound     Range of motion deficit        Physician: Ruel Johnson MD    Physician: Russo-Digeorge, Jamie L*; Dr. Ruel Johnson     Physician Orders: Eval and Treat  Medical Diagnosis: Open crushing injury of finger [S67.10XA, S61.209A]  Surgical Procedure and Date: 6/23/23,   Irrigation and sharp excisional debridement right long finger with debridement of portions of nonviable skin, subcutaneous tissue, tendon, and bone  Removal of foreign material right long finger open wounds  Extensor tendon repair in zone 1 right long finger  Escharotomy right long finger  Transarticular extension pinning of the DIP joint of the right long finger  Placement of Integra skin substitute to the right long finger, 2 distinct wounds  Nail plate removal right long finger     Onset Date: 6/22/23  Evaluation Date: 7/27/2023  Insurance Authorization Period Expiration: 6/21/24  Plan of Care Certification Period: 10/6/23  Date of Return to MD: 8/22/23  FOTO intake score: 1/3, 7/27/23  29%      Visit # / Visits authorized: 3 / 24  (# of No Show Appts 4/Number of Cancelled Appts (some appointments cancelled due to awaiting insurance auth)     Precautions:  Standard, Weightbearing, and no motion of DIP and healing wound    Time In: 9:40  Time Out: 10:30  Total Billable Time: 50 minutes      Occupation (including job description if provided): is a  for the [employer]  Job Demands: use of hands, picks up trays, pushes equipment, stacking mail, handles boxes  Current Work Restrictions: she has not yet been cleared to return to work    SUBJECTIVE     Pt reports: she was unable to attend therapy for 2 weeks 2* having covid and was unable to call because her phone was broken  She was  partially  compliant with home exercise program given last session.   Response to previous treatment:  Presents with swan neck  Functional change: unable to use her LF, very limited motion. She has still not been cleared to return to work.    Pain: 7/10 with pain meds prior to therapys   Location: right LF DIP    OBJECTIVE   Objective Measures updated at progress report unless specified.    Edema. Measured in centimeters.    7/27/2023 9/7/23 9/7/23     Right  Left  Right   Long:          P1 6.8 5.8  5.9    PIP 6.8  5.8 6.2   P2 6.3  5.1 5.4    DIP 5.7  4.8 5   P3 5.5  4.3 4.5      Elbow and Wrist ROM. Measured in degrees.    7/31/2023 7/31/2023     Right Left   Elbow Ext/Flex       Supination/Pronation       Wrist Ext/Flex       Wrist RD/UD             Hand ROM. Measured in degrees.  ROM grossly assessed and to be more formally assessed at next visit.  R LF AROM ~25% of normal motion, other digits ~50% of normal.     7/27/2023 9/7/23      Right  Right         Post    Index: MP    80 84              PIP       78 89              DIP   10 24              CRUZ             Long:  MP   84 94              PIP   H+10/30 H+15/47              DIP   36/40 51/56              CRUZ             Ring:   MP   85 96              PIP   90 84              DIP   5 24              CRUZ             Small:  MP   90 90               PIP   98 95               DIP   20 33              CRUZ             Thumb: MP                    IP           Rad ADD/ABD           Pal ADD/ABD              Opposition             Treatment     Javier received the treatments listed below:     Supervised modalities after being cleared for contradictions: Hot Pack   Patient received moist hot pack x 10 min to R hand to increase blood flow, circulation and tissue elasticity prior to therex      Manual therapy techniques: Manual Lymphatic Drainage and Soft tissue Mobilization were applied to the: right LF for 10 minutes, including:  Gentle  soft tissue mobilization to LF (NT 2* fluid buildup underneath skin)  Gentle passive stretch of PIP flexion    Therapeutic exercises to develop ROM and flexibility for 30 minutes, including:    AROM   PIP blocking   X 20 reps each    Wave  Hook  straight fist, composite fist finger spreads finger lifts (NT)   X 20 reps each    Fitted and issued flexion splint  Ed on wear schedule          NT: Therapeutic activities to improve functional performance for 0  minutes, including:    Ortho check and train: x 0 minutes (NT)  Fabricated a finger based static dorsal block orthosis to right LF to prevent swan neck positioning and improve DIP extension positioning.  Instructed to wear full time with brief removal for bathing/hygiene. Discussed unstrapping distal straps to perform flexion of PIP with the dorsal block.  Instructed to monitor for pain/pressure, increased edema, or redness and to contact office for adjustments as needed. Patient reported good understanding of orthotic wear and care schedule. Satisfactory fit noted, but may need adjustments due to changes in DIP extension. May need increased flexion at PIP of orthosis.         Patient Education and Home Exercises      Education provided:   - reviewed orthosis wear as above  -reviewed HEP and given new handout  - Progress towards goals     Written Home Exercises Provided: yes.  Exercises were reviewed and Javier was able to demonstrate them prior to the end of the session.  Javier demonstrated good  understanding of the HEP provided. See EMR under Patient Instructions for exercises provided during therapy sessions.      ASSESSMENT      Pt presented for second visit since OT evaluation. Reported that she was unable to attend for the past 2 weeks 2* to her and her infant having covid, she was unable to contact us 2* her phone being broken. She arrived without dorsal block orthosis donned, verbalized she took it off to come here, educated on importance of full time  wear. Per objective measures, she demonstrates improved flexion towards making a fist, however demo's a worsening swan neck deformity. Issued flexion glove per MD order and ed on appropriate wear and schedule of 3x/day for 15 min and increase to 30 min at tolerated, demo'd understanding.       The patient's current job specific task deficits include the following: unable to us her hand, unable to manipulate things in her hand. Unable to lift or carry things in her hand    Javier is not progressing well towards her goals due to gap in therapy and there are no updates to goals at this time. Pt prognosis is Fair-.     Pt will continue to benefit from skilled outpatient occupational therapy to address the deficits listed in the problem list on initial evaluation, provide pt/family education and to maximize pt's level of independence in the home and community environment.     Pt's spiritual, cultural and educational needs considered and pt agreeable to plan of care and goals.    Anticipated barriers to occupational therapy: severity of injury, gap in therapy due to awaiting authorization from workers comp    Goals:  Long Term Goals (LTGs); to be met by discharge.  1) Pt will report pain no higher than 1/10 with ADLs, job duties, caring for her child, and house hold tasks---progressing  2) Pt will have an improved FOTO score by at least 20 points---progressing  3) Pt will demonstrate improved right LF AROM WFL for performing daily tasks and grasping---progressing  4)  strength to be assessed when appropriate and set goals---progressing  5) Pt will report return to prior level of function---progressing        Short Term Goals (STGs); to be met within 4 weeks (8/27/23).  1) Pt will report or demonstrate independence with HEP and orthosis wear---progressing  2) Pt will report pain no higher than 4/10 with ADLs---progressing  3) Formally assess right LF AROM next session---progressing  4) Pt will report independence with  dressing tasks---progressing  5) Pt will demonstrate improved edema in right LF by at least 0.3 cm---progressing    PLAN     Continue skilled occupational therapy with individualized plan of care focusing on maximizing functional use of her hand    Updates/Grading for next session: cont to progress with ROM as able    JUAN ALBERTO Alva/NINO

## 2023-09-29 ENCOUNTER — CLINICAL SUPPORT (OUTPATIENT)
Dept: REHABILITATION | Facility: HOSPITAL | Age: 33
End: 2023-09-29
Payer: OTHER GOVERNMENT

## 2023-09-29 DIAGNOSIS — T14.90XD HEALING WOUND: ICD-10-CM

## 2023-09-29 DIAGNOSIS — M79.644 PAIN OF FINGER OF RIGHT HAND: Primary | ICD-10-CM

## 2023-09-29 DIAGNOSIS — M25.60 RANGE OF MOTION DEFICIT: ICD-10-CM

## 2023-09-29 PROCEDURE — 97110 THERAPEUTIC EXERCISES: CPT | Mod: CO

## 2023-09-29 PROCEDURE — 97140 MANUAL THERAPY 1/> REGIONS: CPT | Mod: CO

## 2023-09-29 NOTE — PROGRESS NOTES
[facility]  Occupational Therapy Progress Note & Re-evaluation    Date: 9/29/2023  Name: Javier Anderson  Clinic Number: [id number]    Therapy Diagnosis:   Encounter Diagnoses   Name Primary?    Pain of finger of right hand Yes    Healing wound     Range of motion deficit        Physician: Ruel Johnson MD    Physician: Russo-Digeorge, Jamie L*; Dr. Ruel Johnson     Physician Orders: Eval and Treat  Medical Diagnosis: Open crushing injury of finger [S67.10XA, S61.209A]  Surgical Procedure and Date: 6/23/23,   Irrigation and sharp excisional debridement right long finger with debridement of portions of nonviable skin, subcutaneous tissue, tendon, and bone  Removal of foreign material right long finger open wounds  Extensor tendon repair in zone 1 right long finger  Escharotomy right long finger  Transarticular extension pinning of the DIP joint of the right long finger  Placement of Integra skin substitute to the right long finger, 2 distinct wounds  Nail plate removal right long finger     Onset Date: 6/22/23  Evaluation Date: 7/27/2023  Insurance Authorization Period Expiration: 6/21/24  Plan of Care Certification Period: 10/6/23  Date of Return to MD: 8/22/23  FOTO intake score: 1/3, 7/27/23  29%      Visit # / Visits authorized: 4 / 24  (# of No Show Appts 4/Number of Cancelled Appts (some appointments cancelled due to awaiting insurance auth)     Precautions:  Standard, Weightbearing, and no motion of DIP and healing wound    Time In: 9:02  Time Out: 9:50  Total Billable Time: 48 minutes      Occupation (including job description if provided): is a  for [employer]  Job Demands: use of hands, picks up trays, pushes equipment, stacking mail, handles boxes  Current Work Restrictions: she has not yet been cleared to return to work    SUBJECTIVE     Pt reports: wearing flexion glove for an hour 3-4 x a day, brought to be tightened today  She was partially  compliant with home exercise  program given last session.   Response to previous treatment:  Presents with swan neck  Functional change: unable to use her LF, very limited motion. She has still not been cleared to return to work.    Pain: 0/10 \  Location: right LF DIP    OBJECTIVE   Objective Measures updated at progress report unless specified.    Edema. Measured in centimeters.    7/27/2023 9/7/23 9/7/23     Right  Left  Right   Long:          P1 6.8 5.8  5.9    PIP 6.8  5.8 6.2   P2 6.3  5.1 5.4    DIP 5.7  4.8 5   P3 5.5  4.3 4.5      Elbow and Wrist ROM. Measured in degrees.    7/31/2023 7/31/2023     Right Left   Elbow Ext/Flex       Supination/Pronation       Wrist Ext/Flex       Wrist RD/UD             Hand ROM. Measured in degrees.  ROM grossly assessed and to be more formally assessed at next visit.  R LF AROM ~25% of normal motion, other digits ~50% of normal.     7/27/2023 9/7/23 9/26/23     Right  Right Right        Post    Index: MP    80 84              PIP       78 89              DIP   10 24              CRUZ             Long:  MP   84 94              PIP   H+10/30 H+15/47              DIP   36/40 51/56              CRUZ             Ring:   MP   85 96              PIP   90 84              DIP   5 24              CRUZ             Small:  MP   90 90               PIP   98 95               DIP   20 33              CRUZ             Thumb: MP                    IP           Rad ADD/ABD           Pal ADD/ABD              Opposition             Treatment     Javier received the treatments listed below:     Supervised modalities after being cleared for contradictions: Hot Pack   Patient received moist hot pack x 10 min to R hand to increase blood flow, circulation and tissue elasticity prior to therex      Manual therapy techniques: Manual Lymphatic Drainage and Soft tissue Mobilization were applied to the: right LF for 10 minutes, including:  Gentle soft tissue mobilization to LF (NT 2* fluid buildup underneath  skin)  Gentle passive stretch of PIP flexion    Therapeutic exercises to develop ROM and flexibility for 28 minutes, including:    AROM   PIP blocking   X 20 reps each    Wave  Hook  straight fist, composite fist finger spreads finger lifts (NT)   X 20 reps each    Fitted and issued oval 8 size 8 Ed on wear schedule, proper positioning and purpose    Large dowel grasps  X 3 min          NT: Therapeutic activities to improve functional performance for 0  minutes, including:    Ortho check and train: x 0 minutes (NT)  Fabricated a finger based static dorsal block orthosis to right LF to prevent swan neck positioning and improve DIP extension positioning.  Instructed to wear full time with brief removal for bathing/hygiene. Discussed unstrapping distal straps to perform flexion of PIP with the dorsal block.  Instructed to monitor for pain/pressure, increased edema, or redness and to contact office for adjustments as needed. Patient reported good understanding of orthotic wear and care schedule. Satisfactory fit noted, but may need adjustments due to changes in DIP extension. May need increased flexion at PIP of orthosis.         Patient Education and Home Exercises      Education provided:   - reviewed orthosis wear as above  -reviewed HEP and given new handout  - Progress towards goals     Written Home Exercises Provided: yes.  Exercises were reviewed and Javier was able to demonstrate them prior to the end of the session.  Javier demonstrated good  understanding of the HEP provided. See EMR under Patient Instructions for exercises provided during therapy sessions.      ASSESSMENT     Pt tolerated session fairly well today. C/o pain with stretching and PIP ROM. Issued oval 8 to limit PIP hyperext, demod understanding of donning/doffing and wear.     The patient's current job specific task deficits include the following: unable to us her hand, unable to manipulate things in her hand. Unable to lift or carry things in  her hand    Javier is not progressing well towards her goals due to gap in therapy and there are no updates to goals at this time. Pt prognosis is Fair-.     Pt will continue to benefit from skilled outpatient occupational therapy to address the deficits listed in the problem list on initial evaluation, provide pt/family education and to maximize pt's level of independence in the home and community environment.     Pt's spiritual, cultural and educational needs considered and pt agreeable to plan of care and goals.    Anticipated barriers to occupational therapy: severity of injury, gap in therapy due to awaiting authorization from workers comp    Goals:  Long Term Goals (LTGs); to be met by discharge.  1) Pt will report pain no higher than 1/10 with ADLs, job duties, caring for her child, and house hold tasks---progressing  2) Pt will have an improved FOTO score by at least 20 points---progressing  3) Pt will demonstrate improved right LF AROM WFL for performing daily tasks and grasping---progressing  4)  strength to be assessed when appropriate and set goals---progressing  5) Pt will report return to prior level of function---progressing        Short Term Goals (STGs); to be met within 4 weeks (8/27/23).  1) Pt will report or demonstrate independence with HEP and orthosis wear---progressing  2) Pt will report pain no higher than 4/10 with ADLs---progressing  3) Formally assess right LF AROM next session---progressing  4) Pt will report independence with dressing tasks---progressing  5) Pt will demonstrate improved edema in right LF by at least 0.3 cm---progressing    PLAN     Continue skilled occupational therapy with individualized plan of care focusing on maximizing functional use of her hand    Updates/Grading for next session: cont to progress with ROM as able    JUAN ALBERTO Alva/NINO

## 2023-10-03 ENCOUNTER — CLINICAL SUPPORT (OUTPATIENT)
Dept: REHABILITATION | Facility: HOSPITAL | Age: 33
End: 2023-10-03
Payer: OTHER GOVERNMENT

## 2023-10-03 DIAGNOSIS — M79.644 PAIN OF FINGER OF RIGHT HAND: Primary | ICD-10-CM

## 2023-10-03 DIAGNOSIS — M25.60 RANGE OF MOTION DEFICIT: ICD-10-CM

## 2023-10-03 DIAGNOSIS — T14.90XD HEALING WOUND: ICD-10-CM

## 2023-10-03 PROCEDURE — 97763 ORTHC/PROSTC MGMT SBSQ ENC: CPT

## 2023-10-03 PROCEDURE — 97140 MANUAL THERAPY 1/> REGIONS: CPT

## 2023-10-03 PROCEDURE — 97530 THERAPEUTIC ACTIVITIES: CPT

## 2023-10-03 PROCEDURE — 97110 THERAPEUTIC EXERCISES: CPT

## 2023-10-03 NOTE — PROGRESS NOTES
"  [facility]  Occupational Therapy Progress Note    Date: 10/3/2023  Name: Javier Anderson  Clinic Number: [id number]    Therapy Diagnosis:   Encounter Diagnoses   Name Primary?    Pain of finger of right hand Yes    Healing wound     Range of motion deficit          Physician: Russo-Digeorge, Jamie L*; Dr. Ruel Johnson     Physician Orders: Eval and Treat  Medical Diagnosis: Open crushing injury of finger [S67.10XA, S61.209A]  Surgical Procedure and Date: 6/23/23,   Irrigation and sharp excisional debridement right long finger with debridement of portions of nonviable skin, subcutaneous tissue, tendon, and bone  Removal of foreign material right long finger open wounds  Extensor tendon repair in zone 1 right long finger  Escharotomy right long finger  Transarticular extension pinning of the DIP joint of the right long finger  Placement of Integra skin substitute to the right long finger, 2 distinct wounds  Nail plate removal right long finger     Onset Date: 6/22/23  Evaluation Date: 7/27/2023  Insurance Authorization Period Expiration: 6/21/24  Plan of Care Certification Period: 10/6/23  Date of Return to MD: 8/22/23  FOTO intake score: 1/3, 7/27/23  29%      Visit # / Visits authorized: 5 / 24  (# of No Show Appts 4/Number of Cancelled Appts (some appointments cancelled due to awaiting insurance auth)     Precautions:  Standard,   Per MD, now able to stretch PIP more (9/21/23)    Time In: 11:06  Time Out: 12:00  Total Billable Time: 54 minutes      Occupation (including job description if provided): is a  for the [employer]  Job Demands: use of hands, picks up trays, pushes equipment, stacking mail, handles boxes  Current Work Restrictions: she has not yet been cleared to return to work    SUBJECTIVE     Pt reports: "it was hurting and it was swollen."  She was partially  compliant with home exercise program given last session.   Response to previous treatment:  Presents with swan neck, " pain with PIP flexion  Functional change: unable to use her LF, very limited motion. She has still not been cleared to return to work.    Pain: 0/10 \  Location: right LF DIP    OBJECTIVE   Objective Measures updated at progress report unless specified.    Edema. Measured in centimeters.    7/27/2023 9/7/23 9/7/23     Right  Left  Right   Long:          P1 6.8 5.8  5.9    PIP 6.8  5.8 6.2   P2 6.3  5.1 5.4    DIP 5.7  4.8 5   P3 5.5  4.3 4.5      Elbow and Wrist ROM. Measured in degrees.    7/31/2023 7/31/2023     Right Left   Elbow Ext/Flex       Supination/Pronation       Wrist Ext/Flex       Wrist RD/UD             Hand ROM. Measured in degrees.  ROM grossly assessed and to be more formally assessed at next visit.  R LF AROM ~25% of normal motion, other digits ~50% of normal.     7/27/2023 9/7/23 9/26/23     Right  Right Right        Post    Index: MP    80 84              PIP       78 89              DIP   10 24              CRUZ             Long:  MP   84 94              PIP   H+10/30 H+15/47              DIP   36/40 51/56              CRUZ             Ring:   MP   85 96              PIP   90 84              DIP   5 24              CRUZ             Small:  MP   90 90               PIP   98 95               DIP   20 33              CRUZ             Thumb: MP                    IP           Rad ADD/ABD           Pal ADD/ABD              Opposition             Treatment     Javier received the treatments listed below:     Supervised modalities after being cleared for contradictions: Hot Pack   Patient received moist hot pack x 8 min to R hand to increase blood flow, circulation and tissue elasticity prior to therex, with fingers in composite fist for PIP stretch      Manual therapy techniques: Manual Lymphatic Drainage and Soft tissue Mobilization were applied to the: right LF for 10 minutes, including:  Gentle soft tissue mobilization to LF (NT 2* fluid buildup underneath skin)  Gentle  passive stretch of PIP flexion    Therapeutic exercises to develop ROM and flexibility for 11 minutes, including:    AROM   PIP blocking   X 20 reps each    Wave  Hook  straight fist, composite fist finger spreads finger lifts (NT)   X 20 reps each    Fitted and issued oval 8 size 9 Reviewed wear schedule, proper positioning and purpose    Large dowel grasps  X 3 min          Therapeutic activities to improve functional performance for 15 minutes, including:  -isospheres x 2 min  - pom pom  with ex-large poms for functional grasp and flex of LF x 3 sets  -in hand manipulation with ex-large poms working poms down towards SF x 4 sets  -FM and in hand manipulation with large poms x 2 sets    Ortho check and train: x 10 minutes   Fabricated a small static volar DIP finger based orthosis to improve DIP extension positioning.  Instructed to wear with exercises and if needed at night.  Instructed to monitor for pain/pressure, increased edema, or redness and to contact office for adjustments as needed. Patient reported good understanding of orthotic wear and care schedule. Satisfactory fit noted, but unable to get DIP in full extension.         Patient Education and Home Exercises      Education provided:   - reviewed orthosis wear as above, to wear with certain exercises  -reviewed joint blocking and stretches of PIP  -reviewed HEP and given new handout  - Progress towards goals     Written Home Exercises Provided: yes.  Exercises were reviewed and Javier was able to demonstrate them prior to the end of the session.  Javier demonstrated good  understanding of the HEP provided. See EMR under Patient Instructions for exercises provided during therapy sessions.      ASSESSMENT     Pt tolerated session fairly well today. C/o increased pain with stretching and PIP ROM, but tolerated the stretching fairly and participated. . Issued oval 8 size 9 today due to pt was unable to heriberto other one recently due to swelling.   Increased PIP flexion noted at end of session. Still unable to make a fist, but improved motion noted today.     The patient's current job specific task deficits include the following: unable to use her hand, unable to manipulate things in her hand. Unable to lift or carry things in her hand. Unable to  tightly    Javier is progressing slowly towards her goals due to gap in therapy and there are no updates to goals at this time. Pt prognosis is Fair-.     Pt will continue to benefit from skilled outpatient occupational therapy to address the deficits listed in the problem list on initial evaluation, provide pt/family education and to maximize pt's level of independence in the home and community environment.     Pt's spiritual, cultural and educational needs considered and pt agreeable to plan of care and goals.    Anticipated barriers to occupational therapy: severity of injury, gap in therapy due to awaiting authorization from workers comp    Goals:  Long Term Goals (LTGs); to be met by discharge.  1) Pt will report pain no higher than 1/10 with ADLs, job duties, caring for her child, and house hold tasks---progressing  2) Pt will have an improved FOTO score by at least 20 points---progressing  3) Pt will demonstrate improved right LF AROM WFL for performing daily tasks and grasping---progressing  4)  strength to be assessed when appropriate and set goals---progressing  5) Pt will report return to prior level of function---progressing        Short Term Goals (STGs); to be met within 4 weeks (8/27/23).  1) Pt will report or demonstrate independence with HEP and orthosis wear---progressing  2) Pt will report pain no higher than 4/10 with ADLs---progressing  3) Formally assess right LF AROM next session---progressing  4) Pt will report independence with dressing tasks---progressing  5) Pt will demonstrate improved edema in right LF by at least 0.3 cm---progressing    PLAN     Continue skilled occupational  therapy with individualized plan of care focusing on maximizing functional use of her hand    Updates/Grading for next session: cont to progress with ROM as able. Take measurements next session.     RAFAT Pérez, YAMILETHT

## 2023-10-09 DIAGNOSIS — S67.10XA OPEN CRUSHING INJURY OF FINGER: ICD-10-CM

## 2023-10-09 DIAGNOSIS — S61.209A OPEN CRUSHING INJURY OF FINGER: ICD-10-CM

## 2023-10-10 RX ORDER — TRAMADOL HYDROCHLORIDE 50 MG/1
50 TABLET ORAL EVERY 6 HOURS
Qty: 28 TABLET | Refills: 0 | Status: SHIPPED | OUTPATIENT
Start: 2023-10-10 | End: 2023-10-19 | Stop reason: SDUPTHER

## 2023-10-17 ENCOUNTER — CLINICAL SUPPORT (OUTPATIENT)
Dept: REHABILITATION | Facility: HOSPITAL | Age: 33
End: 2023-10-17
Payer: OTHER GOVERNMENT

## 2023-10-17 ENCOUNTER — PATIENT MESSAGE (OUTPATIENT)
Dept: ORTHOPEDICS | Facility: CLINIC | Age: 33
End: 2023-10-17
Payer: OTHER GOVERNMENT

## 2023-10-17 DIAGNOSIS — M25.60 RANGE OF MOTION DEFICIT: ICD-10-CM

## 2023-10-17 DIAGNOSIS — T14.90XD HEALING WOUND: ICD-10-CM

## 2023-10-17 DIAGNOSIS — M79.644 PAIN OF FINGER OF RIGHT HAND: Primary | ICD-10-CM

## 2023-10-17 PROCEDURE — 97140 MANUAL THERAPY 1/> REGIONS: CPT | Mod: CO

## 2023-10-17 PROCEDURE — 97530 THERAPEUTIC ACTIVITIES: CPT | Mod: CO

## 2023-10-17 PROCEDURE — 97110 THERAPEUTIC EXERCISES: CPT | Mod: CO

## 2023-10-17 NOTE — PROGRESS NOTES
[facility]  Occupational Therapy Progress Note    Date: 10/17/2023  Name: Javier Anderson  Clinic Number: [id number]    Therapy Diagnosis:   Encounter Diagnoses   Name Primary?    Pain of finger of right hand Yes    Healing wound     Range of motion deficit            Physician: Russo-Digeorge, Jamie L*; Dr. Ruel Johnson     Physician Orders: Eval and Treat  Medical Diagnosis: Open crushing injury of finger [S67.10XA, S61.209A]  Surgical Procedure and Date: 6/23/23,   Irrigation and sharp excisional debridement right long finger with debridement of portions of nonviable skin, subcutaneous tissue, tendon, and bone  Removal of foreign material right long finger open wounds  Extensor tendon repair in zone 1 right long finger  Escharotomy right long finger  Transarticular extension pinning of the DIP joint of the right long finger  Placement of Integra skin substitute to the right long finger, 2 distinct wounds  Nail plate removal right long finger     Onset Date: 6/22/23  Evaluation Date: 7/27/2023  Insurance Authorization Period Expiration: 6/21/24  Plan of Care Certification Period: 10/6/23  Date of Return to MD: 8/22/23  FOTO intake score: 1/3, 7/27/23  29%      Visit # / Visits authorized: 6 / 24  (# of No Show Appts 4/Number of Cancelled Appts (some appointments cancelled due to awaiting insurance auth)     Precautions:  Standard,   Per MD, now able to stretch PIP more (9/21/23)    Time In: 10:19  Time Out: 11  Total Billable Time: 41 minutes      Occupation (including job description if provided): is a  for [employer]  Job Demands: use of hands, picks up trays, pushes equipment, stacking mail, handles boxes  Current Work Restrictions: she has not yet been cleared to return to work    SUBJECTIVE     Pt reports: continued drainage   She was partially  compliant with home exercise program given last session.   Response to previous treatment:  Presents with swan neck, pain with PIP  flexion  Functional change: unable to use her LF, very limited motion. She has still not been cleared to return to work.    Pain: 0/10 \  Location: right LF DIP    OBJECTIVE   Objective Measures updated at progress report unless specified.    Edema. Measured in centimeters.    7/27/2023 9/7/23 9/7/23     Right  Left  Right   Long:          P1 6.8 5.8  5.9    PIP 6.8  5.8 6.2   P2 6.3  5.1 5.4    DIP 5.7  4.8 5   P3 5.5  4.3 4.5      Elbow and Wrist ROM. Measured in degrees.    7/31/2023 7/31/2023     Right Left   Elbow Ext/Flex       Supination/Pronation       Wrist Ext/Flex       Wrist RD/UD             Hand ROM. Measured in degrees.  ROM grossly assessed and to be more formally assessed at next visit.  R LF AROM ~25% of normal motion, other digits ~50% of normal.     7/27/2023 9/7/23 9/26/23 10/17/23     Right  Right Right Right         Post     Index: MP    80 84 90              PIP       78 89 85              DIP   10 24 35              CRUZ               Long:  MP   84 94 86              PIP   H+10/30 H+15/47 +9/49              DIP   36/40 51/56 59/64              CRUZ               Ring:   MP   85 96               PIP   90 84               DIP   5 24               CRUZ               Small:  MP   90 90                PIP   98 95                DIP   20 33               CRUZ               Thumb: MP                     IP            Rad ADD/ABD            Pal ADD/ABD               Opposition              Treatment     Javier received the treatments listed below:     Supervised modalities after being cleared for contradictions: Hot Pack (NT 2* reported drainage)  Patient received moist hot pack x 0 min to R hand to increase blood flow, circulation and tissue elasticity prior to therex, with fingers in composite fist for PIP stretch      Manual therapy techniques: Manual Lymphatic Drainage and Soft tissue Mobilization were applied to the: right LF for 8 minutes, including:  Gentle soft  tissue mobilization to LF (NT 2* fluid buildup underneath skin)  Gentle passive stretch of PIP flexion    Therapeutic exercises to develop ROM and flexibility for 15 minutes, including:    AROM   PIP blocking   X 20 reps each    Wave  Hook  straight fist, composite fist finger spreads finger lifts (NT)   X 20 reps each    Fitted and issued oval 8 size 9 Reviewed wear schedule, proper positioning and purpose (NT)   Large dowel grasps  X 3 min    [place] with MP block yellow foam X 20          Therapeutic activities to improve functional performance for 18 minutes, including:  -isospheres x 2 min  - pom pom  with ex-large poms for functional grasp and flex of LF x 3 sets  -in hand manipulation with ex-large poms working poms down towards SF x 4 sets  -FM and in hand manipulation with large poms x 2 sets    Ortho check and train: x 10 minutes  (NT)  Fabricated a small static volar DIP finger based orthosis to improve DIP extension positioning.  Instructed to wear with exercises and if needed at night.  Instructed to monitor for pain/pressure, increased edema, or redness and to contact office for adjustments as needed. Patient reported good understanding of orthotic wear and care schedule. Satisfactory fit noted, but unable to get DIP in full extension.         Patient Education and Home Exercises      Education provided:   - reviewed orthosis wear as above, to wear with certain exercises  -reviewed joint blocking and stretches of PIP  -reviewed HEP and given new handout  - Progress towards goals     Written Home Exercises Provided: yes.  Exercises were reviewed and Javier was able to demonstrate them prior to the end of the session.  Javier demonstrated good  understanding of the HEP provided. See EMR under Patient Instructions for exercises provided during therapy sessions.      ASSESSMENT     Pt presented today with report of continued drainage near incision site. Advised to contact her MD. Improved tolerance  for passive stretching today. ROM is improving toward making fist, per objective measures, however DIP ext lag continues to worsen.     The patient's current job specific task deficits include the following: unable to use her hand, unable to manipulate things in her hand. Unable to lift or carry things in her hand. Unable to  tightly    Javier is progressing slowly towards her goals due to gap in therapy and there are no updates to goals at this time. Pt prognosis is Fair-.     Pt will continue to benefit from skilled outpatient occupational therapy to address the deficits listed in the problem list on initial evaluation, provide pt/family education and to maximize pt's level of independence in the home and community environment.     Pt's spiritual, cultural and educational needs considered and pt agreeable to plan of care and goals.    Anticipated barriers to occupational therapy: severity of injury, gap in therapy due to awaiting authorization from workers comp    Goals:  Long Term Goals (LTGs); to be met by discharge.  1) Pt will report pain no higher than 1/10 with ADLs, job duties, caring for her child, and house hold tasks---progressing  2) Pt will have an improved FOTO score by at least 20 points---progressing  3) Pt will demonstrate improved right LF AROM WFL for performing daily tasks and grasping---progressing  4)  strength to be assessed when appropriate and set goals---progressing  5) Pt will report return to prior level of function---progressing        Short Term Goals (STGs); to be met within 4 weeks (8/27/23).  1) Pt will report or demonstrate independence with HEP and orthosis wear---progressing  2) Pt will report pain no higher than 4/10 with ADLs---progressing  3) Formally assess right LF AROM next session---progressing  4) Pt will report independence with dressing tasks---progressing  5) Pt will demonstrate improved edema in right LF by at least 0.3 cm---progressing    PLAN     Continue  skilled occupational therapy with individualized plan of care focusing on maximizing functional use of her hand    Updates/Grading for next session: cont to progress with ROM as able.    JUAN ALBERTO Alva/NINO         Home

## 2023-10-19 ENCOUNTER — HOSPITAL ENCOUNTER (OUTPATIENT)
Dept: RADIOLOGY | Facility: HOSPITAL | Age: 33
Discharge: HOME | End: 2023-10-19
Attending: PHYSICIAN ASSISTANT
Payer: OTHER GOVERNMENT

## 2023-10-19 ENCOUNTER — OFFICE VISIT (OUTPATIENT)
Dept: ORTHOPEDICS | Facility: CLINIC | Age: 33
End: 2023-10-19
Payer: OTHER GOVERNMENT

## 2023-10-19 DIAGNOSIS — S62.632D OPEN DISPLACED FRACTURE OF DISTAL PHALANX OF RIGHT MIDDLE FINGER WITH ROUTINE HEALING, SUBSEQUENT ENCOUNTER: Primary | ICD-10-CM

## 2023-10-19 DIAGNOSIS — Z98.890 POSTOPERATIVE STATE: ICD-10-CM

## 2023-10-19 DIAGNOSIS — S62.632D OPEN DISPLACED FRACTURE OF DISTAL PHALANX OF RIGHT MIDDLE FINGER WITH ROUTINE HEALING, SUBSEQUENT ENCOUNTER: ICD-10-CM

## 2023-10-19 DIAGNOSIS — S61.209A OPEN CRUSHING INJURY OF FINGER: ICD-10-CM

## 2023-10-19 DIAGNOSIS — S67.10XA OPEN CRUSHING INJURY OF FINGER: ICD-10-CM

## 2023-10-19 PROCEDURE — 73140 XR FINGER 2 OR MORE VIEWS RIGHT: ICD-10-PCS | Mod: 26,RT,, | Performed by: RADIOLOGY

## 2023-10-19 PROCEDURE — 73140 X-RAY EXAM OF FINGER(S): CPT | Mod: 26,RT,, | Performed by: RADIOLOGY

## 2023-10-19 PROCEDURE — 99999 PR PBB SHADOW E&M-EST. PATIENT-LVL III: ICD-10-PCS | Mod: PBBFAC,,, | Performed by: PHYSICIAN ASSISTANT

## 2023-10-19 PROCEDURE — 99999 PR PBB SHADOW E&M-EST. PATIENT-LVL III: CPT | Mod: PBBFAC,,, | Performed by: PHYSICIAN ASSISTANT

## 2023-10-19 PROCEDURE — 73140 X-RAY EXAM OF FINGER(S): CPT | Mod: TC,PN,RT

## 2023-10-19 PROCEDURE — 99024 POSTOP FOLLOW-UP VISIT: CPT | Mod: S$GLB,,, | Performed by: PHYSICIAN ASSISTANT

## 2023-10-19 PROCEDURE — 99024 PR POST-OP FOLLOW-UP VISIT: ICD-10-PCS | Mod: S$GLB,,, | Performed by: PHYSICIAN ASSISTANT

## 2023-10-19 RX ORDER — MELOXICAM 15 MG/1
15 TABLET ORAL DAILY
Qty: 30 TABLET | Refills: 0 | Status: SHIPPED | OUTPATIENT
Start: 2023-10-19 | End: 2023-12-21

## 2023-10-19 RX ORDER — SULFAMETHOXAZOLE AND TRIMETHOPRIM 800; 160 MG/1; MG/1
1 TABLET ORAL 2 TIMES DAILY
Qty: 14 TABLET | Refills: 0 | Status: SHIPPED | OUTPATIENT
Start: 2023-10-19 | End: 2023-10-27

## 2023-10-19 RX ORDER — TRAMADOL HYDROCHLORIDE 50 MG/1
50 TABLET ORAL EVERY 6 HOURS
Qty: 28 TABLET | Refills: 0 | Status: SHIPPED | OUTPATIENT
Start: 2023-10-19 | End: 2023-11-28 | Stop reason: SDUPTHER

## 2023-10-19 NOTE — PROGRESS NOTES
"Dr. Johnson is the supervising physician for this encounter/patient    Javier Anderson presents for post-operative evaluation.  The patient is now 4 months s/p below procedures with Dr. Johnson on 6/23/23.    PROCEDURE(S) PERFORMED:    Irrigation and sharp excisional debridement right long finger with debridement of portions of nonviable skin, subcutaneous tissue, tendon, and bone  Removal of foreign material right long finger open wounds  Extensor tendon repair in zone 1 right long finger  Escharotomy right long finger  Transarticular extension pinning of the DIP joint of the right long finger  Placement of Integra skin substitute to the right long finger, 2 distinct wounds  Nail plate removal right long finger    She reports that when she last saw Dr. Johnson in September, the finger appeared "wet", clear. And that it has been draining clear fluid since then. She reports that is last drained clear fluid on Tuesday. She is going to therapy which started exceptionally late due to WC denials. She is wearing orthosis and doing her exercises as instructed.    PE:    AA&O x 4.  NAD  HEENT:  NCAT, sclera nonicteric  Lungs:  Respirations are equal and unlabored.  CV:  2+ bilateral upper and lower extremity pulses.  MSK: The wound is well healed, no openings in the skin.  There is no drainage.  No clinical signs or symptoms of infection are present.  [place] neck deformity present. She is able to flex the PIP isolated by about 80 degrees, unable to make an active fist. SILT. DNVI.    A/P: Status post above, low concern for infection today. [place] neck deformity.  1) Low concern for infection, however I do write her for Bactrim x 7 days as a precaution. Mobic and Tramadol ordered. We discuss warm compresses to the finger as well.  2) Continue with aggressive OT per Dr. Johnson.  3) she is scheduled to see Dr. Johnson on 11/2/23.  4) Call with any questions/concerns in the interim      Jamie Russo-DiGeorge PA-C    "

## 2023-10-25 ENCOUNTER — DOCUMENTATION ONLY (OUTPATIENT)
Dept: REHABILITATION | Facility: HOSPITAL | Age: 33
End: 2023-10-25
Payer: OTHER GOVERNMENT

## 2023-10-25 NOTE — PROGRESS NOTES
No Show Note/Documentation    Patient: Javier Anderson  Date of Session: 10/25/2023  Diagnosis: No diagnosis found.  MRN: [id number]    Javier Anderson did not attend his/her scheduled therapy appointment today. She did not call to cancel nor reschedule. Next appointment is scheduled for 10/27 and will follow up with patient at that time. No charges have been posted today.       PALLAVI Alva  10/25/2023

## 2023-10-31 NOTE — PLAN OF CARE
[facility]  Occupational Therapy Updated POC     Date: 10/17/2023  Name: Javier Anderson  Clinic Number: [id number]     Therapy Diagnosis:        Encounter Diagnoses   Name Primary?    Pain of finger of right hand Yes    Healing wound      Range of motion deficit                 Physician: Russo-Digeorge, Jamie L*; Dr. Ruel Johnson     Physician Orders: Eval and Treat  Medical Diagnosis: Open crushing injury of finger [S67.10XA, S61.209A]  Surgical Procedure and Date: 6/23/23,   Irrigation and sharp excisional debridement right long finger with debridement of portions of nonviable skin, subcutaneous tissue, tendon, and bone  Removal of foreign material right long finger open wounds  Extensor tendon repair in zone 1 right long finger  Escharotomy right long finger  Transarticular extension pinning of the DIP joint of the right long finger  Placement of Integra skin substitute to the right long finger, 2 distinct wounds  Nail plate removal right long finger     Onset Date: 6/22/23  Evaluation Date: 7/27/2023  Insurance Authorization Period Expiration: 6/21/24  Plan of Care Certification Period: 10/6/23; updated to 12/15/23  Date of Return to MD: 11/2/23  FOTO intake score: 1/3, 7/27/23  29%        Visit # / Visits authorized: 6 / 24  (# of No Show Appts 4/Number of Cancelled Appts (some appointments cancelled due to awaiting insurance auth)     Precautions:  Standard,   Per MD, now able to stretch PIP more (9/21/23)     Time In: 10:19  Time Out: 11  Total Billable Time: 41 minutes        Occupation (including job description if provided): is a  for the [employer]  Job Demands: use of hands, picks up trays, pushes equipment, stacking mail, handles boxes  Current Work Restrictions: she has not yet been cleared to return to work     SUBJECTIVE      Pt reports: continued drainage per pt and AVENDANO  She was partially  compliant with home exercise program given last session.   Response to previous  treatment:  Presents with swan neck, pain with PIP flexion  Functional change: unable to use her LF, very limited motion. She has still not been cleared to return to work. She is still limited with functional use of her finger     Pain: 0/10 \  Location: right LF DIP     OBJECTIVE   Objective Measures updated at progress report unless specified.     Edema. Measured in centimeters.    7/27/2023 9/7/23 9/7/23     Right  Left  Right   Long:           P1 6.8 5.8  5.9    PIP 6.8  5.8 6.2   P2 6.3  5.1 5.4    DIP 5.7  4.8 5   P3 5.5  4.3 4.5      Elbow and Wrist ROM. Measured in degrees.    7/31/2023 7/31/2023     Right Left   Elbow Ext/Flex       Supination/Pronation       Wrist Ext/Flex       Wrist RD/UD             Hand ROM. Measured in degrees.  ROM grossly assessed and to be more formally assessed at next visit.  R LF AROM ~25% of normal motion, other digits ~50% of normal.     7/27/2023 9/7/23 9/26/23 10/17/23     Right  Right Right Right          Post      Index: MP    80 84 90              PIP       78 89 85              DIP   10 24 35              CRUZ                    Long:  MP   84 94 86              PIP   H+10/30 H+15/47 +9/49              DIP   36/40 51/56 59/64              CRUZ                    Ring:   MP   85 96                PIP   90 84                DIP   5 24                CRUZ                    Small:  MP   90 90                 PIP   98 95                 DIP   20 33                CRUZ                    Thumb: MP                        IP               Rad ADD/ABD               Pal ADD/ABD                  Opposition                   ASSESSMENT      Had face to face meeting with AVENDANO regarding pt progress and plan. Discussed that we will cont with therapy as able to progress towards improved functional use of her hand. She cont to present with  report of continued drainage near incision site. She was advised to contact her MD. She cont to have significant DIP ext  lag, and stiffness in PIP. However, ROM of MP and PIP are improving overall. Cont to encourage daily and regular passive stretching of her PIP. She has not yet met goals. She has had some gaps in therapy thus far and has not always been consistent with attendance sometimes due to transportation. She would cont to benefit from therapy to cont to progress towards goals and improve her functional hand use to return to work.      The patient's current job specific task deficits include the following: unable to use her hand, unable to manipulate things in her hand. Unable to lift or carry things in her hand. Unable to  tightly     Javier is progressing slowly towards her goals due to gaps in therapy and there are no updates to goals at this time. Pt prognosis is Fair-.      Pt will continue to benefit from skilled outpatient occupational therapy to address the deficits listed in the problem list on initial evaluation, provide pt/family education and to maximize pt's level of independence in the home and community environment.      Pt's spiritual, cultural and educational needs considered and pt agreeable to plan of care and goals.     Anticipated barriers to occupational therapy: severity of injury, gap in therapy due to awaiting authorization from workers comp     Goals:  Long Term Goals (LTGs); to be met by discharge.  1) Pt will report pain no higher than 1/10 with ADLs, job duties, caring for her child, and house hold tasks---progressing  2) Pt will have an improved FOTO score by at least 20 points---progressing  3) Pt will demonstrate improved right LF AROM WFL for performing daily tasks and grasping---progressing  4)  strength to be assessed when appropriate and set goals---progressing  5) Pt will report return to prior level of function---progressing        Short Term Goals (STGs); to be met within 4 weeks (8/27/23).  1) Pt will report or demonstrate independence with HEP and orthosis  wear---progressing  2) Pt will report pain no higher than 4/10 with ADLs---progressing  3) Formally assess right LF AROM next session---met  4) Pt will report independence with dressing tasks---progressing  5) Pt will demonstrate improved edema in right LF by at least 0.3 cm---progressing     PLAN      Continue skilled occupational therapy 1-2x/week as able for 8 more weeks during certification period 10/17/23 - 12/15/23 with individualized plan of care focusing on maximizing functional use of her hand     Updates/Grading for next session: cont to progress with ROM as able.     RAFAT Pérez, CHT      I certify the need for these services furnished under the plan of treatment and while under my care.     ____________________________________________________________________  Physician/Referring Practitioner   Date of Signature

## 2023-11-02 ENCOUNTER — OFFICE VISIT (OUTPATIENT)
Dept: ORTHOPEDICS | Facility: CLINIC | Age: 33
End: 2023-11-02
Payer: MEDICAID

## 2023-11-02 ENCOUNTER — LAB VISIT (OUTPATIENT)
Dept: LAB | Facility: HOSPITAL | Age: 33
End: 2023-11-02
Attending: ORTHOPAEDIC SURGERY
Payer: MEDICAID

## 2023-11-02 VITALS — HEIGHT: 68 IN | BODY MASS INDEX: 29.4 KG/M2 | WEIGHT: 194 LBS

## 2023-11-02 DIAGNOSIS — S62.632D OPEN DISPLACED FRACTURE OF DISTAL PHALANX OF RIGHT MIDDLE FINGER WITH ROUTINE HEALING, SUBSEQUENT ENCOUNTER: ICD-10-CM

## 2023-11-02 DIAGNOSIS — S62.632D OPEN DISPLACED FRACTURE OF DISTAL PHALANX OF RIGHT MIDDLE FINGER WITH ROUTINE HEALING, SUBSEQUENT ENCOUNTER: Primary | ICD-10-CM

## 2023-11-02 LAB
BASOPHILS # BLD AUTO: 0.03 K/UL (ref 0–0.2)
BASOPHILS NFR BLD: 0.7 % (ref 0–1.9)
CRP SERPL-MCNC: 1.3 MG/L (ref 0–8.2)
DIFFERENTIAL METHOD: ABNORMAL
EOSINOPHIL # BLD AUTO: 0.1 K/UL (ref 0–0.5)
EOSINOPHIL NFR BLD: 1.1 % (ref 0–8)
ERYTHROCYTE [DISTWIDTH] IN BLOOD BY AUTOMATED COUNT: 13.3 % (ref 11.5–14.5)
ERYTHROCYTE [SEDIMENTATION RATE] IN BLOOD BY PHOTOMETRIC METHOD: 5 MM/HR (ref 0–36)
HCT VFR BLD AUTO: 34 % (ref 37–48.5)
HGB BLD-MCNC: 10.8 G/DL (ref 12–16)
IMM GRANULOCYTES # BLD AUTO: 0.01 K/UL (ref 0–0.04)
IMM GRANULOCYTES NFR BLD AUTO: 0.2 % (ref 0–0.5)
LYMPHOCYTES # BLD AUTO: 2 K/UL (ref 1–4.8)
LYMPHOCYTES NFR BLD: 45.8 % (ref 18–48)
MCH RBC QN AUTO: 29.3 PG (ref 27–31)
MCHC RBC AUTO-ENTMCNC: 31.8 G/DL (ref 32–36)
MCV RBC AUTO: 92 FL (ref 82–98)
MONOCYTES # BLD AUTO: 0.3 K/UL (ref 0.3–1)
MONOCYTES NFR BLD: 5.7 % (ref 4–15)
NEUTROPHILS # BLD AUTO: 2 K/UL (ref 1.8–7.7)
NEUTROPHILS NFR BLD: 46.5 % (ref 38–73)
NRBC BLD-RTO: 0 /100 WBC
PLATELET # BLD AUTO: 285 K/UL (ref 150–450)
PMV BLD AUTO: 10 FL (ref 9.2–12.9)
RBC # BLD AUTO: 3.68 M/UL (ref 4–5.4)
WBC # BLD AUTO: 4.37 K/UL (ref 3.9–12.7)

## 2023-11-02 PROCEDURE — 85652 RBC SED RATE AUTOMATED: CPT | Performed by: ORTHOPAEDIC SURGERY

## 2023-11-02 PROCEDURE — 85025 COMPLETE CBC W/AUTO DIFF WBC: CPT | Performed by: ORTHOPAEDIC SURGERY

## 2023-11-02 PROCEDURE — 99999 PR PBB SHADOW E&M-EST. PATIENT-LVL III: ICD-10-PCS | Mod: PBBFAC,,, | Performed by: ORTHOPAEDIC SURGERY

## 2023-11-02 PROCEDURE — 99213 OFFICE O/P EST LOW 20 MIN: CPT | Mod: PBBFAC | Performed by: ORTHOPAEDIC SURGERY

## 2023-11-02 PROCEDURE — 86140 C-REACTIVE PROTEIN: CPT | Performed by: ORTHOPAEDIC SURGERY

## 2023-11-02 PROCEDURE — 99024 POSTOP FOLLOW-UP VISIT: CPT | Mod: ,,, | Performed by: ORTHOPAEDIC SURGERY

## 2023-11-02 PROCEDURE — 99999 PR PBB SHADOW E&M-EST. PATIENT-LVL III: CPT | Mod: PBBFAC,,, | Performed by: ORTHOPAEDIC SURGERY

## 2023-11-02 PROCEDURE — 36415 COLL VENOUS BLD VENIPUNCTURE: CPT | Performed by: ORTHOPAEDIC SURGERY

## 2023-11-02 PROCEDURE — 99024 PR POST-OP FOLLOW-UP VISIT: ICD-10-PCS | Mod: ,,, | Performed by: ORTHOPAEDIC SURGERY

## 2023-11-02 NOTE — PROGRESS NOTES
Javier Anderson presents for post-operative evaluation.  The patient is s/p below procedures with Dr. Johnson on 6/23/23.      PROCEDURE(S) PERFORMED:    Irrigation and sharp excisional debridement right long finger with debridement of portions of nonviable skin, subcutaneous tissue, tendon, and bone  Removal of foreign material right long finger open wounds  Extensor tendon repair in zone 1 right long finger  Escharotomy right long finger  Transarticular extension pinning of the DIP joint of the right long finger  Placement of Integra skin substitute to the right long finger, 2 distinct wounds  Nail plate removal right long finger    She is doing somewhat better.  She notes that she is doing exercises and attending therapy focusing primarily on PIP flexion to the right long finger.  Chart review certainly does demonstrate several missed appointments and cancellations.  She was seen by Jamie Russo DiGeorge for some reported drainage recently.  She was placed on prophylactic antibiotics at that time but there was low concern for infection at that time.  She denies drainage over the past couple of weeks whatsoever.  She feels that she is slowly doing better in regards to her PIP flexion but that her DIP still remains with severe extensor lag and deformity with pain.  This is not really evolving with time and additional therapy.  She returns for re-evaluation with no other complaints.    PE:    AA&O x 4.  NAD  HEENT:  NCAT, sclera nonicteric  Lungs:  Respirations are equal and unlabored.  CV:  2+ bilateral upper and lower extremity pulses.  MSK: The wound is well-healed with no signs of erythema or warmth.  There is no drainage.  No clinical signs or symptoms of infection are present.  Nail deformity present. SILT. DNVI.  Right long finger with semi-rigid swan-neck deformity.  Minimal active DIP range of motion with significant extensor lag.  Passively I can get the PIP joint to flex to approximately 60 ° this is  moderately uncomfortable for the patient.  She can not bring this finger into the palm though her active PIP flexion is to approximately 50° today which is an improvement.    Imaging:  Right long finger with retained suture anchor in the distal phalanx with no evidence of any osseous complications    A/P: Status post above, with swan-neck deformity right long finger  1) at this point in time I do not appreciate any signs concerning for infection.  I will however obtain some baseline labs today.  Her PIP joint is coming along though it is still stiff.  We discussed the importance of therapy and continued home exercises.  In regards to her DIP deformity dysfunction and pain, she may ultimately be best served by a D IP arthrodesis which we discussed today and she seems to be on board with that.  For now would like to focus more on PIP flexion prior to undergoing additional surgical procedures.  From a work status perspective, she may return to work with no use of the right hand at this point in time.  Continue therapy and aggressive, diligent, and repetitive home exercises.  Follow-up in 6 weeks for re-evaluation or sooner for any problems.

## 2023-11-28 DIAGNOSIS — S61.209A OPEN CRUSHING INJURY OF FINGER: ICD-10-CM

## 2023-11-28 DIAGNOSIS — S67.10XA OPEN CRUSHING INJURY OF FINGER: ICD-10-CM

## 2023-11-28 RX ORDER — TRAMADOL HYDROCHLORIDE 50 MG/1
50 TABLET ORAL EVERY 6 HOURS
Qty: 28 TABLET | Refills: 0 | Status: SHIPPED | OUTPATIENT
Start: 2023-11-28 | End: 2023-12-21 | Stop reason: SDUPTHER

## 2023-11-28 RX ORDER — IBUPROFEN 800 MG/1
800 TABLET ORAL 3 TIMES DAILY
Qty: 90 TABLET | Refills: 2 | Status: SHIPPED | OUTPATIENT
Start: 2023-11-28 | End: 2023-12-21 | Stop reason: SDUPTHER

## 2023-12-11 ENCOUNTER — OFFICE VISIT (OUTPATIENT)
Dept: URGENT CARE | Facility: CLINIC | Age: 33
End: 2023-12-11
Payer: MEDICAID

## 2023-12-11 VITALS
RESPIRATION RATE: 20 BRPM | BODY MASS INDEX: 29.4 KG/M2 | WEIGHT: 194 LBS | DIASTOLIC BLOOD PRESSURE: 71 MMHG | HEART RATE: 83 BPM | OXYGEN SATURATION: 98 % | SYSTOLIC BLOOD PRESSURE: 108 MMHG | HEIGHT: 68 IN | TEMPERATURE: 98 F

## 2023-12-11 DIAGNOSIS — J20.8 ACUTE BACTERIAL BRONCHITIS: ICD-10-CM

## 2023-12-11 DIAGNOSIS — R05.9 COUGH, UNSPECIFIED TYPE: Primary | ICD-10-CM

## 2023-12-11 DIAGNOSIS — B96.89 ACUTE BACTERIAL BRONCHITIS: ICD-10-CM

## 2023-12-11 LAB
CTP QC/QA: YES
FLUAV AG NPH QL: NEGATIVE
FLUBV AG NPH QL: NEGATIVE
S PYO RRNA THROAT QL PROBE: NEGATIVE
SARS-COV-2 AG RESP QL IA.RAPID: NEGATIVE

## 2023-12-11 PROCEDURE — 87880 POCT RAPID STREP A: ICD-10-PCS | Mod: QW,,, | Performed by: NURSE PRACTITIONER

## 2023-12-11 PROCEDURE — 87811 SARS-COV-2 COVID19 W/OPTIC: CPT | Mod: QW,S$GLB,, | Performed by: NURSE PRACTITIONER

## 2023-12-11 PROCEDURE — 87804 INFLUENZA ASSAY W/OPTIC: CPT | Mod: QW,,, | Performed by: NURSE PRACTITIONER

## 2023-12-11 PROCEDURE — 87804 POCT INFLUENZA A/B: ICD-10-PCS | Mod: QW,,, | Performed by: NURSE PRACTITIONER

## 2023-12-11 PROCEDURE — 87811 SARS CORONAVIRUS 2 ANTIGEN POCT, MANUAL READ: ICD-10-PCS | Mod: QW,S$GLB,, | Performed by: NURSE PRACTITIONER

## 2023-12-11 PROCEDURE — 99214 PR OFFICE/OUTPT VISIT, EST, LEVL IV, 30-39 MIN: ICD-10-PCS | Mod: S$GLB,,, | Performed by: NURSE PRACTITIONER

## 2023-12-11 PROCEDURE — 99214 OFFICE O/P EST MOD 30 MIN: CPT | Mod: S$GLB,,, | Performed by: NURSE PRACTITIONER

## 2023-12-11 PROCEDURE — 87880 STREP A ASSAY W/OPTIC: CPT | Mod: QW,,, | Performed by: NURSE PRACTITIONER

## 2023-12-11 RX ORDER — AZITHROMYCIN 250 MG/1
TABLET, FILM COATED ORAL
Qty: 6 TABLET | Refills: 0 | Status: SHIPPED | OUTPATIENT
Start: 2023-12-11

## 2023-12-11 RX ORDER — CODEINE PHOSPHATE AND GUAIFENESIN 10; 100 MG/5ML; MG/5ML
5 SOLUTION ORAL EVERY 6 HOURS PRN
Qty: 118 ML | Refills: 0 | Status: SHIPPED | OUTPATIENT
Start: 2023-12-11 | End: 2023-12-21

## 2023-12-11 NOTE — PROGRESS NOTES
"Subjective:      Patient ID: Javier Anderson is a 33 y.o. female.    Vitals:  height is 5' 8" (1.727 m) and weight is 88 kg (194 lb). Her temperature is 97.5 °F (36.4 °C). Her blood pressure is 108/71 and her pulse is 83. Her respiration is 20 and oxygen saturation is 98%.     Chief Complaint: Cough    Cough  Episode onset: x'4 days ago. The problem has been gradually worsening. The problem occurs constantly. The cough is Productive of sputum. Associated symptoms include headaches, nasal congestion and postnasal drip. She has tried OTC cough suppressant for the symptoms. The treatment provided no relief.       HENT:  Positive for postnasal drip.    Respiratory:  Positive for cough.    Neurological:  Positive for headaches.      Objective:     Physical Exam   HENT:   Nose: Nose normal.   Mouth/Throat: Mucous membranes are moist. Oropharynx is clear.   Eyes: Pupils are equal, round, and reactive to light.   Neck: Neck supple.   Cardiovascular: Normal rate, regular rhythm, normal heart sounds and normal pulses.   Pulmonary/Chest: She has wheezes. She has rhonchi.   Abdominal: Normal appearance.   Neurological: She is alert.   Vitals reviewed.      Assessment:     1. Cough, unspecified type    2. Acute bacterial bronchitis        Plan:       Cough, unspecified type  -     SARS Coronavirus 2 Antigen, POCT Manual Read  -     POCT Influenza A/B Rapid Antigen  -     POCT rapid strep A    Acute bacterial bronchitis    Other orders  -     guaiFENesin-codeine 100-10 mg/5 ml (TUSSI-ORGANIDIN NR)  mg/5 mL syrup; Take 5 mLs by mouth every 6 (six) hours as needed for Cough.  Dispense: 118 mL; Refill: 0  -     azithromycin (Z-GIANCARLO) 250 MG tablet; Take 2 tablets by mouth on day 1; Take 1 tablet by mouth on days 2-5  Dispense: 6 tablet; Refill: 0                  Covid flu strep negative  Rhonchi and wheeze in bilateral. Bronchitis rx as above.   "

## 2023-12-12 DIAGNOSIS — S62.632D OPEN DISPLACED FRACTURE OF DISTAL PHALANX OF RIGHT MIDDLE FINGER WITH ROUTINE HEALING, SUBSEQUENT ENCOUNTER: Primary | ICD-10-CM

## 2023-12-21 ENCOUNTER — HOSPITAL ENCOUNTER (OUTPATIENT)
Dept: RADIOLOGY | Facility: HOSPITAL | Age: 33
Discharge: HOME | End: 2023-12-21
Attending: ORTHOPAEDIC SURGERY
Payer: MEDICAID

## 2023-12-21 ENCOUNTER — OFFICE VISIT (OUTPATIENT)
Dept: ORTHOPEDICS | Facility: CLINIC | Age: 33
End: 2023-12-21
Payer: MEDICAID

## 2023-12-21 DIAGNOSIS — S62.632D OPEN DISPLACED FRACTURE OF DISTAL PHALANX OF RIGHT MIDDLE FINGER WITH ROUTINE HEALING, SUBSEQUENT ENCOUNTER: ICD-10-CM

## 2023-12-21 DIAGNOSIS — S62.632D OPEN DISPLACED FRACTURE OF DISTAL PHALANX OF RIGHT MIDDLE FINGER WITH ROUTINE HEALING, SUBSEQUENT ENCOUNTER: Primary | ICD-10-CM

## 2023-12-21 DIAGNOSIS — S67.10XA OPEN CRUSHING INJURY OF FINGER: ICD-10-CM

## 2023-12-21 DIAGNOSIS — S61.209A OPEN CRUSHING INJURY OF FINGER: ICD-10-CM

## 2023-12-21 DIAGNOSIS — M79.641 PAIN OF RIGHT HAND: ICD-10-CM

## 2023-12-21 DIAGNOSIS — M86.149 OTHER ACUTE OSTEOMYELITIS, UNSPECIFIED HAND: ICD-10-CM

## 2023-12-21 PROCEDURE — 1159F MED LIST DOCD IN RCRD: CPT | Mod: CPTII,,, | Performed by: ORTHOPAEDIC SURGERY

## 2023-12-21 PROCEDURE — 73140 XR FINGER 2 OR MORE VIEWS RIGHT: ICD-10-PCS | Mod: 26,RT,, | Performed by: RADIOLOGY

## 2023-12-21 PROCEDURE — 73140 X-RAY EXAM OF FINGER(S): CPT | Mod: 26,RT,, | Performed by: RADIOLOGY

## 2023-12-21 PROCEDURE — 99214 PR OFFICE/OUTPT VISIT, EST, LEVL IV, 30-39 MIN: ICD-10-PCS | Mod: S$PBB,,, | Performed by: ORTHOPAEDIC SURGERY

## 2023-12-21 PROCEDURE — 99999 PR PBB SHADOW E&M-EST. PATIENT-LVL III: CPT | Mod: PBBFAC,,, | Performed by: ORTHOPAEDIC SURGERY

## 2023-12-21 PROCEDURE — 1159F PR MEDICATION LIST DOCUMENTED IN MEDICAL RECORD: ICD-10-PCS | Mod: CPTII,,, | Performed by: ORTHOPAEDIC SURGERY

## 2023-12-21 PROCEDURE — 99213 OFFICE O/P EST LOW 20 MIN: CPT | Mod: PBBFAC | Performed by: ORTHOPAEDIC SURGERY

## 2023-12-21 PROCEDURE — 73140 X-RAY EXAM OF FINGER(S): CPT | Mod: TC,RT

## 2023-12-21 PROCEDURE — 99214 OFFICE O/P EST MOD 30 MIN: CPT | Mod: S$PBB,,, | Performed by: ORTHOPAEDIC SURGERY

## 2023-12-21 PROCEDURE — 99999 PR PBB SHADOW E&M-EST. PATIENT-LVL III: ICD-10-PCS | Mod: PBBFAC,,, | Performed by: ORTHOPAEDIC SURGERY

## 2023-12-21 RX ORDER — IBUPROFEN 800 MG/1
800 TABLET ORAL 3 TIMES DAILY
Qty: 90 TABLET | Refills: 2 | Status: SHIPPED | OUTPATIENT
Start: 2023-12-21 | End: 2024-03-26 | Stop reason: SDUPTHER

## 2023-12-21 RX ORDER — TRAMADOL HYDROCHLORIDE 50 MG/1
50 TABLET ORAL EVERY 6 HOURS
Qty: 28 TABLET | Refills: 0 | Status: SHIPPED | OUTPATIENT
Start: 2023-12-21 | End: 2024-03-26 | Stop reason: SDUPTHER

## 2023-12-21 NOTE — PROGRESS NOTES
Javier Anderson presents for post-operative evaluation.  The patient is s/p below procedures with Dr. Johnson on 6/23/23.      PROCEDURE(S) PERFORMED:    Irrigation and sharp excisional debridement right long finger with debridement of portions of nonviable skin, subcutaneous tissue, tendon, and bone  Removal of foreign material right long finger open wounds  Extensor tendon repair in zone 1 right long finger  Escharotomy right long finger  Transarticular extension pinning of the DIP joint of the right long finger  Placement of Integra skin substitute to the right long finger, 2 distinct wounds  Nail plate removal right long finger    She is About the same at last visit.  Still has deformity, stiffness, and pain.  Her stiffness particularly at the PIP joint has improved since her last visit although she does continue to report intermittent drainage.  She returns for re-evaluation with no other new complaints.  She has not been antibiotics for the finger in several weeks but notes that she was recently on antibiotics because she was sick otherwise.    PE:    AA&O x 4.  NAD  HEENT:  NCAT, sclera nonicteric  Lungs:  Respirations are equal and unlabored.  CV:  2+ bilateral upper and lower extremity pulses.  MSK: The wound is well-healed with no signs of erythema or warmth.  There is no drainage.  No clinical signs or symptoms of infection are present.  Nail deformity present. SILT. DNVI.  Right long finger with semi-rigid swan-neck deformity.  Minimal active DIP range of motion with significant extensor lag.    The patient can get the fingertip about 2 cm from the palm with active flexion at the PIP joint of about 70°.    Imaging:  Right long finger with retained suture anchor in the distal phalanx with   Some bony irregularity about the D IP joint dorsally    A/P: Status post above, with swan-neck deformity right long finger  1) at this point in time  Kim is reporting continued intermittent drainage.  She also looks  like she has some x-ray changes.  I am at this point concerned she may have osteomyelitis.  I would like to get another set of labs and an MRI with and without contrast for further evaluation.  We discussed surgical options which would include incision and drainage, arthrodesis of the DIP, versus partial amputation.  I explained to her that we could not proceed with any sort of the DIP instrumented arthrodesis if there is any concern for infection so we will workup for infection before proceeding with anything.  She is not amenable to amputation at this time.  Follow-up after these labs and MRI or sooner for any problems.

## 2023-12-22 ENCOUNTER — HOSPITAL ENCOUNTER (OUTPATIENT)
Dept: RADIOLOGY | Facility: HOSPITAL | Age: 33
Discharge: HOME | End: 2023-12-22
Attending: ORTHOPAEDIC SURGERY
Payer: MEDICAID

## 2023-12-22 DIAGNOSIS — M79.641 PAIN OF RIGHT HAND: ICD-10-CM

## 2023-12-22 DIAGNOSIS — M86.149 OTHER ACUTE OSTEOMYELITIS, UNSPECIFIED HAND: ICD-10-CM

## 2023-12-22 PROCEDURE — 73220 MRI UPPR EXTREMITY W/O&W/DYE: CPT | Mod: TC,RT

## 2023-12-22 PROCEDURE — 73220 MRI HAND FINGERS W WO CONTRAST RIGHT: ICD-10-PCS | Mod: 26,RT,, | Performed by: RADIOLOGY

## 2023-12-22 PROCEDURE — 73220 MRI UPPR EXTREMITY W/O&W/DYE: CPT | Mod: 26,RT,, | Performed by: RADIOLOGY

## 2023-12-22 PROCEDURE — A9585 GADOBUTROL INJECTION: HCPCS | Performed by: ORTHOPAEDIC SURGERY

## 2023-12-22 PROCEDURE — 25500020 PHARM REV CODE 255: Performed by: ORTHOPAEDIC SURGERY

## 2023-12-22 RX ORDER — GADOBUTROL 604.72 MG/ML
9 INJECTION INTRAVENOUS
Status: COMPLETED | OUTPATIENT
Start: 2023-12-22 | End: 2023-12-22

## 2023-12-22 RX ADMIN — GADOBUTROL 9 ML: 604.72 INJECTION INTRAVENOUS at 05:12

## 2024-01-02 ENCOUNTER — OFFICE VISIT (OUTPATIENT)
Dept: ORTHOPEDICS | Facility: CLINIC | Age: 34
End: 2024-01-02
Payer: MEDICAID

## 2024-01-02 DIAGNOSIS — M86.9 OSTEOMYELITIS OF FINGER: ICD-10-CM

## 2024-01-02 DIAGNOSIS — M86.149 OTHER ACUTE OSTEOMYELITIS, UNSPECIFIED HAND: Primary | ICD-10-CM

## 2024-01-02 DIAGNOSIS — S62.632D OPEN DISPLACED FRACTURE OF DISTAL PHALANX OF RIGHT MIDDLE FINGER WITH ROUTINE HEALING, SUBSEQUENT ENCOUNTER: ICD-10-CM

## 2024-01-02 PROCEDURE — 99999 PR PBB SHADOW E&M-EST. PATIENT-LVL III: CPT | Mod: PBBFAC,,, | Performed by: ORTHOPAEDIC SURGERY

## 2024-01-02 PROCEDURE — 99214 OFFICE O/P EST MOD 30 MIN: CPT | Mod: S$PBB,,, | Performed by: ORTHOPAEDIC SURGERY

## 2024-01-02 PROCEDURE — 1159F MED LIST DOCD IN RCRD: CPT | Mod: CPTII,,, | Performed by: ORTHOPAEDIC SURGERY

## 2024-01-02 PROCEDURE — 99213 OFFICE O/P EST LOW 20 MIN: CPT | Mod: PBBFAC | Performed by: ORTHOPAEDIC SURGERY

## 2024-01-02 RX ORDER — MUPIROCIN 20 MG/G
OINTMENT TOPICAL
OUTPATIENT
Start: 2024-01-02

## 2024-01-02 RX ORDER — CEFAZOLIN SODIUM 2 G/50ML
2 SOLUTION INTRAVENOUS
OUTPATIENT
Start: 2024-01-02

## 2024-01-02 NOTE — H&P
[facility]  History & Physical  Orthopedics     SUBJECTIVE:       Chief Complaint: Right long finger injury     Referring Provider: No ref. provider found      Dr. Johnson is the supervising physician for this encounter/patient     History of Present Illness:  Patient is a 33 y.o. right hand dominant female who presents today in follow-up from her right long finger injury.  She was recently sent for MRI over persistent pain, deformity, and concern for infection and returns for those results and re-evaluation.  No other new complaints.  Pain is still severe and she rates it 8/10 in severity today.     Onset of symptoms/DOI was 6/21/23.     Symptoms are aggravated by activity and movement.     Symptoms are alleviated by  none .     Symptoms consist of pain and deformity     The patient rates their pain as 8/10     Attempted treatment(s) and/or interventions include surgery, activity modifications, rest, antibiotic therapy and wound dressing .     The patient denies any fevers, chills, N/V, D/C and presents for evaluation.             Past Medical History:   Diagnosis Date    COVID-19              Past Surgical History:   Procedure Laterality Date    DILATION AND CURETTAGE OF UTERUS        EYE SURGERY        FINGER TENDON REPAIR Right 6/23/2023     Procedure: REPAIR, TENDON, FINGER - Right Long finger I&D, poss tendon repair, poss skin graft vs integra;  Surgeon: Ruel Johnson MD;  Location: [facility];  Service: Orthopedics;  Laterality: Right;      Review of patient's allergies indicates:  No Known Allergies  Social History          Social History Narrative    Not on file      No family history on file.        Current Outpatient Medications:     acetaminophen (TYLENOL) 500 MG tablet, Take 1 tablet (500 mg total) by mouth every 6 (six) hours as needed for Pain., Disp: 20 tablet, Rfl: 0    amoxicillin (AMOXIL) 500 MG capsule, Take 500 mg by mouth 3 (three) times daily., Disp: , Rfl:     azithromycin  (Z-GIANCARLO) 250 MG tablet, Take 2 tablets by mouth on day 1; Take 1 tablet by mouth on days 2-5, Disp: 6 tablet, Rfl: 0    gabapentin (NEURONTIN) 300 MG capsule, Take 1 capsule (300 mg total) by mouth 3 (three) times daily. for 7 days, Disp: 21 capsule, Rfl: 0    ibuprofen (ADVIL,MOTRIN) 800 MG tablet, Take 1 tablet (800 mg total) by mouth 3 (three) times daily., Disp: 90 tablet, Rfl: 2    medroxyPROGESTERone (DEPO-PROVERA) 150 mg/mL Syrg, Inject 150 mg into the muscle., Disp: , Rfl:     naloxone (NARCAN) 4 mg/actuation Spry, 4mg by nasal route as needed for opioid overdose; may repeat every 2-3 minutes in alternating nostrils until medical help arrives. Call 911, Disp: 1 each, Rfl: 11    oxyCODONE (ROXICODONE) 5 MG immediate release tablet, Take 1 tablet (5 mg total) by mouth every 6 (six) hours as needed for Pain. May take 1-2 tablets every 6 hours as needed for pain, Disp: 15 tablet, Rfl: 0    oxyCODONE-acetaminophen (PERCOCET) 5-325 mg per tablet, Take 1 tablet by mouth every 4 (four) hours as needed for Pain., Disp: 28 tablet, Rfl: 0    promethazine HCl (PHENERGAN ORAL), Take by mouth., Disp: , Rfl:     promethazine-dextromethorphan (PROMETHAZINE-DM) 6.25-15 mg/5 mL Syrp, SMARTSIG:10 Milliliter(s) By Mouth Every 8 Hours PRN, Disp: , Rfl:     traMADoL (ULTRAM) 50 mg tablet, Take 1 tablet (50 mg total) by mouth every 6 (six) hours., Disp: 20 tablet, Rfl: 0    traMADoL (ULTRAM) 50 mg tablet, Take 1 tablet (50 mg total) by mouth every 6 (six) hours., Disp: 28 tablet, Rfl: 0  No current facility-administered medications for this visit.     Facility-Administered Medications Ordered in Other Visits:     fentaNYL 50 mcg/mL injection  mcg,  mcg, Intravenous, PRN, Ramy Ramirez MD, 100 mcg at 06/23/23 1220    haloperidol lactate injection 0.5 mg, 0.5 mg, Intravenous, Q10 Min PRN, Candelaria Parker MD    methocarbamoL tablet 1,000 mg, 1,000 mg, Oral, Once, Candelaria Parker MD    midazolam (VERSED) 1 mg/mL  injection 0.5-4 mg, 0.5-4 mg, Intravenous, PRN, Ramy Ramirez MD, 2 mg at 06/23/23 1220    ondansetron injection 4 mg, 4 mg, Intravenous, Daily PRN, Candelaria Parker MD    sodium chloride 0.9% flush 10 mL, 10 mL, Intravenous, PRN, Candelaria Parker MD        Review of Systems:  Constitutional: no fever or chills  Eyes: no visual changes  ENT: no nasal congestion or sore throat  Respiratory: no cough or shortness of breath  Cardiovascular: no chest pain  Gastrointestinal: no nausea or vomiting, tolerating diet  Musculoskeletal: pain, soreness, and wound     OBJECTIVE:       Vital Signs (Most Recent):  Vitals   There were no vitals filed for this visit.     There is no height or weight on file to calculate BMI.        Physical Exam:  Constitutional: The patient appears well-developed and well-nourished. No distress.   Skin: No lesions appreciated  Head: Normocephalic and atraumatic.   Nose: Nose normal.   Ears: No deformities seen  Eyes: Conjunctivae and EOM are normal.   Neck: No tracheal deviation present.   Cardiovascular: Normal rate and intact distal pulses.    Pulmonary/Chest: Effort normal. No respiratory distress.   Abdominal: There is no guarding.   Neurological: The patient is alert.   Psychiatric: The patient has a normal mood and affect.      Right Hand/Wrist Examination:     Observation/Inspection:  Swelling                       none                  Deformity                     none  Discoloration               none                  Scars                           none                  Atrophy                        none  Large surface of the dorsal DIP long finger is open/exposed, no current bleeding, exposed bone present     HAND/WRIST EXAMINATION:  Finkelstein's Test                                Neg  WHAT Test                                         Neg  Snuff box tenderness                          Neg  Cardenas's Test                                     Neg  Hook of Hamate Tenderness               Neg  CMC grind                                           Neg  Circumduction test                              Neg     Neurovascular Exam:  Digits WWP, brisk CR < 3s throughout  NVI motor/LTS to M/R/U nerves, radial pulse 2+     ROM hand: not assessed due to condition     ROM wrist full, painless    ROM elbow full, painless     Abdomen not guarded  Respirations nonlabored  Perfusion intact        Diagnostic Results:     MRI right long finger:Impression:     Postsurgical changes related to extensor tendon repair at the level of the middle finger DIP joint.  Osteomyelitis and septic arthritis at the DIP joint, as above.     X-ray right long finger-FINDINGS:  Stable postsurgical changes related to a surgical procedure performed June 23, 2023 which included and extensor tendon repair.  A short pin is again noted within the base of the distal phalanx of the right middle finger in stable position.  Stable focus of lucency and irregularity involving the dorsal aspect of the distal aspect of the middle phalanx.  Clinical correlation.  As before, on the 3 images acquired, there is mild extension of the middle phalanx with respect to the proximal phalanx and flexion of the distal phalanx with respect to the middle phalanx.  Clinical correlation.           ASSESSMENT/PLAN:       33 y.o. yo female with Right long finger osteomyelitis status post prior crush injury with exposed bone and surgical reconstruction     Plan: The patient and I had a thorough discussion today.  We discussed the working diagnosis as well as several other potential alternative diagnoses.  Treatment options were discussed, both conservative and surgical.  Conservative treatment options would include things such as activity modifications, anti-inflammatory medications, occupational therapy, splinting/bracing, corticosteroid injections, and others.  Surgical options were discussed as well.     At this point in time, he onto certainly does have  osteomyelitis in addition to persistent deformity and pain of the right long finger.  We discussed further attempts at salvage including deformity correction, long-term IV antibiotics, and attempted preservation of the digit with incision and drainage and bone biopsy/cultures.  We discussed this option versus amputation of the right long finger.  At this point in time she would like to proceed with a partial amputation of the right long finger on January 19, 2024 as I would suspect this will allow her a more quick recovery then attempted salvage.  As such I will get this scheduled.     The patient has not responded to adequate non operative treatment at this time and/or non operative treatment is not indicated. Thus, the risks, benefits and alternatives to surgery were discussed with the patient in detail.  Specific risks include but are not limited to bleeding, infection, vessel and/or nerve damage, pain, numbness, tingling, compartment syndrome, need for additional surgery, failure to return to pre-injury and/or preoperative functional status, inability to return to work, scar sensitivity, delayed healing, complex regional pain syndrome, weakness, pulley injury, tendon injury, bowstringing, partial and/or incomplete relief of symptoms, persistence of and/or worsening of symptoms, hardware and/or surgical failure, prominent and/or symptomatic hardware possibly necessitating future removal, osteomyelitis, amputation, loss of function, stiffness, rotational malalignment, functional debility, dysfunction, decreased  strength, need for prolonged postoperative rehabilitation, malunion, nonunion, deep venous thrombosis, pulmonary embolism, arthritis and death.  The patient states an understanding and wishes to proceed with surgery.   All questions were answered.  No guarantees were implied or stated.  Written informed consent was obtained.        Please do not hesitate to reach out to us via email, phone, or [employer]  with any questions, concerns, or feedback.

## 2024-01-02 NOTE — PROGRESS NOTES
[facility]  History & Physical  Orthopedics    SUBJECTIVE:      Chief Complaint: Right long finger injury    Referring Provider: No ref. provider found     Dr. Johnson is the supervising physician for this encounter/patient    History of Present Illness:  Patient is a 33 y.o. right hand dominant female who presents today in follow-up from her right long finger injury.  She was recently sent for MRI over persistent pain, deformity, and concern for infection and returns for those results and re-evaluation.  No other new complaints.  Pain is still severe and she rates it 8/10 in severity today.    Onset of symptoms/DOI was 6/21/23.    Symptoms are aggravated by activity and movement.    Symptoms are alleviated by  none .    Symptoms consist of pain and deformity    The patient rates their pain as 8/10    Attempted treatment(s) and/or interventions include surgery, activity modifications, rest, antibiotic therapy and wound dressing .     The patient denies any fevers, chills, N/V, D/C and presents for evaluation.       Past Medical History:   Diagnosis Date    COVID-19      Past Surgical History:   Procedure Laterality Date    DILATION AND CURETTAGE OF UTERUS      EYE SURGERY      FINGER TENDON REPAIR Right 6/23/2023    Procedure: REPAIR, TENDON, FINGER - Right Long finger I&D, poss tendon repair, poss skin graft vs integra;  Surgeon: Ruel Johnson MD;  Location: [facility];  Service: Orthopedics;  Laterality: Right;     Review of patient's allergies indicates:  No Known Allergies  Social History     Social History Narrative    Not on file     No family history on file.      Current Outpatient Medications:     acetaminophen (TYLENOL) 500 MG tablet, Take 1 tablet (500 mg total) by mouth every 6 (six) hours as needed for Pain., Disp: 20 tablet, Rfl: 0    amoxicillin (AMOXIL) 500 MG capsule, Take 500 mg by mouth 3 (three) times daily., Disp: , Rfl:     azithromycin (Z-GIANCARLO) 250 MG tablet, Take 2 tablets  by mouth on day 1; Take 1 tablet by mouth on days 2-5, Disp: 6 tablet, Rfl: 0    gabapentin (NEURONTIN) 300 MG capsule, Take 1 capsule (300 mg total) by mouth 3 (three) times daily. for 7 days, Disp: 21 capsule, Rfl: 0    ibuprofen (ADVIL,MOTRIN) 800 MG tablet, Take 1 tablet (800 mg total) by mouth 3 (three) times daily., Disp: 90 tablet, Rfl: 2    medroxyPROGESTERone (DEPO-PROVERA) 150 mg/mL Syrg, Inject 150 mg into the muscle., Disp: , Rfl:     naloxone (NARCAN) 4 mg/actuation Spry, 4mg by nasal route as needed for opioid overdose; may repeat every 2-3 minutes in alternating nostrils until medical help arrives. Call 911, Disp: 1 each, Rfl: 11    oxyCODONE (ROXICODONE) 5 MG immediate release tablet, Take 1 tablet (5 mg total) by mouth every 6 (six) hours as needed for Pain. May take 1-2 tablets every 6 hours as needed for pain, Disp: 15 tablet, Rfl: 0    oxyCODONE-acetaminophen (PERCOCET) 5-325 mg per tablet, Take 1 tablet by mouth every 4 (four) hours as needed for Pain., Disp: 28 tablet, Rfl: 0    promethazine HCl (PHENERGAN ORAL), Take by mouth., Disp: , Rfl:     promethazine-dextromethorphan (PROMETHAZINE-DM) 6.25-15 mg/5 mL Syrp, SMARTSIG:10 Milliliter(s) By Mouth Every 8 Hours PRN, Disp: , Rfl:     traMADoL (ULTRAM) 50 mg tablet, Take 1 tablet (50 mg total) by mouth every 6 (six) hours., Disp: 20 tablet, Rfl: 0    traMADoL (ULTRAM) 50 mg tablet, Take 1 tablet (50 mg total) by mouth every 6 (six) hours., Disp: 28 tablet, Rfl: 0  No current facility-administered medications for this visit.    Facility-Administered Medications Ordered in Other Visits:     fentaNYL 50 mcg/mL injection  mcg,  mcg, Intravenous, PRN, Ramy Ramirez MD, 100 mcg at 06/23/23 1220    haloperidol lactate injection 0.5 mg, 0.5 mg, Intravenous, Q10 Min PRN, Candelaria Parker MD    methocarbamoL tablet 1,000 mg, 1,000 mg, Oral, Once, Candelaria Parker MD    midazolam (VERSED) 1 mg/mL injection 0.5-4 mg, 0.5-4 mg, Intravenous,  PRN, Ramy Ramirez MD, 2 mg at 06/23/23 1220    ondansetron injection 4 mg, 4 mg, Intravenous, Daily PRN, Candelaria Parker MD    sodium chloride 0.9% flush 10 mL, 10 mL, Intravenous, PRN, Candelaria Parker MD      Review of Systems:  Constitutional: no fever or chills  Eyes: no visual changes  ENT: no nasal congestion or sore throat  Respiratory: no cough or shortness of breath  Cardiovascular: no chest pain  Gastrointestinal: no nausea or vomiting, tolerating diet  Musculoskeletal: pain, soreness, and wound    OBJECTIVE:      Vital Signs (Most Recent):  There were no vitals filed for this visit.  There is no height or weight on file to calculate BMI.      Physical Exam:  Constitutional: The patient appears well-developed and well-nourished. No distress.   Skin: No lesions appreciated  Head: Normocephalic and atraumatic.   Nose: Nose normal.   Ears: No deformities seen  Eyes: Conjunctivae and EOM are normal.   Neck: No tracheal deviation present.   Cardiovascular: Normal rate and intact distal pulses.    Pulmonary/Chest: Effort normal. No respiratory distress.   Abdominal: There is no guarding.   Neurological: The patient is alert.   Psychiatric: The patient has a normal mood and affect.     Right Hand/Wrist Examination:    Observation/Inspection:  Swelling  none    Deformity  none  Discoloration  none     Scars   none    Atrophy  none  Large surface of the dorsal DIP long finger is open/exposed, no current bleeding, exposed bone present    HAND/WRIST EXAMINATION:  Finkelstein's Test   Neg  WHAT Test    Neg  Snuff box tenderness   Neg  Cardenas's Test    Neg  Hook of Hamate Tenderness  Neg  CMC grind    Neg  Circumduction test   Neg    Neurovascular Exam:  Digits WWP, brisk CR < 3s throughout  NVI motor/LTS to M/R/U nerves, radial pulse 2+    ROM hand: not assessed due to condition    ROM wrist full, painless    ROM elbow full, painless    Abdomen not guarded  Respirations nonlabored  Perfusion  intact      Diagnostic Results:     MRI right long finger:Impression:     Postsurgical changes related to extensor tendon repair at the level of the middle finger DIP joint.  Osteomyelitis and septic arthritis at the DIP joint, as above.     X-ray right long finger-FINDINGS:  Stable postsurgical changes related to a surgical procedure performed June 23, 2023 which included and extensor tendon repair.  A short pin is again noted within the base of the distal phalanx of the right middle finger in stable position.  Stable focus of lucency and irregularity involving the dorsal aspect of the distal aspect of the middle phalanx.  Clinical correlation.  As before, on the 3 images acquired, there is mild extension of the middle phalanx with respect to the proximal phalanx and flexion of the distal phalanx with respect to the middle phalanx.  Clinical correlation.         ASSESSMENT/PLAN:      33 y.o. yo female with Right long finger osteomyelitis status post prior crush injury with exposed bone and surgical reconstruction    Plan: The patient and I had a thorough discussion today.  We discussed the working diagnosis as well as several other potential alternative diagnoses.  Treatment options were discussed, both conservative and surgical.  Conservative treatment options would include things such as activity modifications, anti-inflammatory medications, occupational therapy, splinting/bracing, corticosteroid injections, and others.  Surgical options were discussed as well.    At this point in time, he onto certainly does have osteomyelitis in addition to persistent deformity and pain of the right long finger.  We discussed further attempts at salvage including deformity correction, long-term IV antibiotics, and attempted preservation of the digit with incision and drainage and bone biopsy/cultures.  We discussed this option versus amputation of the right long finger.  At this point in time she would like to proceed with a  partial amputation of the right long finger on January 19, 2024 as I would suspect this will allow her a more quick recovery then attempted salvage.  As such I will get this scheduled.    The patient has not responded to adequate non operative treatment at this time and/or non operative treatment is not indicated. Thus, the risks, benefits and alternatives to surgery were discussed with the patient in detail.  Specific risks include but are not limited to bleeding, infection, vessel and/or nerve damage, pain, numbness, tingling, compartment syndrome, need for additional surgery, failure to return to pre-injury and/or preoperative functional status, inability to return to work, scar sensitivity, delayed healing, complex regional pain syndrome, weakness, pulley injury, tendon injury, bowstringing, partial and/or incomplete relief of symptoms, persistence of and/or worsening of symptoms, hardware and/or surgical failure, prominent and/or symptomatic hardware possibly necessitating future removal, osteomyelitis, amputation, loss of function, stiffness, rotational malalignment, functional debility, dysfunction, decreased  strength, need for prolonged postoperative rehabilitation, malunion, nonunion, deep venous thrombosis, pulmonary embolism, arthritis and death.  The patient states an understanding and wishes to proceed with surgery.   All questions were answered.  No guarantees were implied or stated.  Written informed consent was obtained.      Please do not hesitate to reach out to us via email, phone, or MyChart with any questions, concerns, or feedback.

## 2024-01-09 ENCOUNTER — HOSPITAL ENCOUNTER (EMERGENCY)
Facility: HOSPITAL | Age: 34
Discharge: HOME | End: 2024-01-09
Attending: EMERGENCY MEDICINE
Payer: MEDICAID

## 2024-01-09 VITALS
DIASTOLIC BLOOD PRESSURE: 72 MMHG | RESPIRATION RATE: 20 BRPM | SYSTOLIC BLOOD PRESSURE: 120 MMHG | WEIGHT: 186 LBS | TEMPERATURE: 98 F | HEIGHT: 68 IN | HEART RATE: 74 BPM | BODY MASS INDEX: 28.19 KG/M2 | OXYGEN SATURATION: 98 %

## 2024-01-09 DIAGNOSIS — Z20.1 EXPOSURE TO TB: ICD-10-CM

## 2024-01-09 PROCEDURE — 36415 COLL VENOUS BLD VENIPUNCTURE: CPT | Performed by: NURSE PRACTITIONER

## 2024-01-09 PROCEDURE — 99283 EMERGENCY DEPT VISIT LOW MDM: CPT | Mod: 25

## 2024-01-09 PROCEDURE — 86480 TB TEST CELL IMMUN MEASURE: CPT | Performed by: NURSE PRACTITIONER

## 2024-01-09 NOTE — FIRST PROVIDER EVALUATION
Emergency Department TeleTriage Encounter Note      CHIEF COMPLAINT    Chief Complaint   Patient presents with    tb exposure     Patient's boyfriend was just diagnosed with TB and is currently hospitalized at [facility] with active TB and patient has a cough and headache, wants to get tested and treated if needed        VITAL SIGNS   Initial Vitals [01/09/24 1251]   BP Pulse Resp Temp SpO2   120/72 74 20 98.1 °F (36.7 °C) 98 %      MAP       --            ALLERGIES    Review of patient's allergies indicates:  No Known Allergies    PROVIDER TRIAGE NOTE  Patient presents with complaint of cough. Her boyfriend is hospitalized with active TB. She was exposed to him at [place] and then this weekend when he was sick and coughing and was admitted.       ORDERS  Labs Reviewed - No data to display    ED Orders (720h ago, onward)      None              Virtual Visit Note: The provider triage portion of this emergency department evaluation and documentation was performed via [employer], a HIPAA-compliant telemedicine application, in concert with a tele-presenter in the room. A face to face patient evaluation with one of my colleagues will occur once the patient is placed in an emergency department room.      DISCLAIMER: This note was prepared with [employer]" voice recognition transcription software. Garbled syntax, mangled pronouns, and other bizarre constructions may be attributed to that software system.

## 2024-01-09 NOTE — ED NOTES
Department of health called back, they advised the patient give them a call. I contacted the patient and gave her the phone number ([phone]) to call.

## 2024-01-09 NOTE — ED NOTES
[facility], [phone], was called. No one answered and a voicemail was left.

## 2024-01-09 NOTE — ED PROVIDER NOTES
Encounter Date: 1/9/2024       History     Chief Complaint   Patient presents with    tb exposure     Patient's boyfriend was just diagnosed with TB and is currently hospitalized at [facility] with active TB and patient has a cough and headache, wants to get tested and treated if needed      Patient is a 33 y.o. female who presents to the ED 01/09/2024 with a chief complaint of exposure to TB.  Patient's boyfriend is currently on a ventilator with active TB at a local hospital.  Patient states she last saw him on Saturday.  Patient states prior to that she saw him once over Christmas holiday this past Christmas.  She states she was not with him for September and October prior to that as they were  but she was with him for the 4 years prior to the separation.  She states she has had a mild cough without fever or night sweats the last couple of days.  Denies any chest pain or shortness of breath or other symptoms at this time.  She denies pregnancy or other medical problems or history or allergies.           Review of patient's allergies indicates:  No Known Allergies  Past Medical History:   Diagnosis Date    COVID-19      Past Surgical History:   Procedure Laterality Date    DILATION AND CURETTAGE OF UTERUS      EYE SURGERY      FINGER TENDON REPAIR Right 6/23/2023    Procedure: REPAIR, TENDON, FINGER - Right Long finger I&D, poss tendon repair, poss skin graft vs integra;  Surgeon: Ruel Johnson MD;  Location: [facility];  Service: Orthopedics;  Laterality: Right;     No family history on file.  Social History     Tobacco Use    Smoking status: Never    Smokeless tobacco: Never   Substance Use Topics    Alcohol use: No    Drug use: No     Review of Systems   Constitutional:  Negative for chills and fever.   HENT:  Negative for sore throat.    Respiratory:  Positive for cough. Negative for chest tightness and shortness of breath.    Cardiovascular:  Negative for chest pain.   Gastrointestinal:  Negative for  abdominal pain.   Genitourinary:  Negative for dysuria.   Musculoskeletal:  Negative for arthralgias and myalgias.   Skin:  Negative for rash and wound.   Neurological:  Negative for syncope.   Hematological:  Does not bruise/bleed easily.       Physical Exam     Initial Vitals [01/09/24 1251]   BP Pulse Resp Temp SpO2   120/72 74 20 98.1 °F (36.7 °C) 98 %      MAP       --         Physical Exam    Nursing note and vitals reviewed.  Constitutional: Vital signs are normal. She appears well-developed and well-nourished.   HENT:   Head: Normocephalic and atraumatic.   Eyes: Pupils are equal, round, and reactive to light.   Neck: Neck supple.   Cardiovascular:  Normal rate, regular rhythm, normal heart sounds and intact distal pulses.     Exam reveals no gallop and no friction rub.       No murmur heard.  Pulmonary/Chest: Breath sounds normal. She has no wheezes. She has no rhonchi. She has no rales.   Abdominal: Abdomen is soft. Bowel sounds are normal. There is no abdominal tenderness.   Musculoskeletal:      Cervical back: Neck supple.     Neurological: She is alert and oriented to person, place, and time. She has normal strength.   Skin: Skin is warm, dry and intact.   Psychiatric: She has a normal mood and affect. Her speech is normal and behavior is normal.         ED Course   Procedures  Labs Reviewed   QUANTIFERON GOLD TB   POCT URINE PREGNANCY          Imaging Results              X-Ray Chest PA And Lateral (Final result)  Result time 01/09/24 13:25:35      Final result by Erin Coyle MD (01/09/24 13:25:35)                   Impression:      No acute cardiopulmonary disease      Electronically signed by: Erin Coyle MD  Date:    01/09/2024  Time:    13:25               Narrative:    EXAMINATION:  XR CHEST PA AND LATERAL    CLINICAL HISTORY:  Contact with and (suspected) exposure to tuberculosis    TECHNIQUE:  PA and lateral views of the chest were  performed.    COMPARISON:  05/11/2021    FINDINGS:  The heart is not enlarged.  The lungs are well expanded and clear.  The costophrenic sulci are sharp.                                       Medications - No data to display  Medical Decision Making  Amount and/or Complexity of Data Reviewed  Labs: ordered.         APC / Resident Notes:   Patient is a 33 y.o. female who presents to the ED 01/09/2024 who underwent emergent evaluation For exposure to TB.  She has a mild cough with no other symptoms.  She has not febrile.  Discussed with Dr. De Los Santos who agrees with TB gold testing and outpatient follow-up with her and the Department of Health.  No further treatment indicated at this time.  Chest x-ray unremarkable.  Bilateral breath sounds clear respirations even nonlabored.  Patient is not hypoxic or tachypneic.  Vital signs normal. I do not think other secondary bacterial infection such as pneumonia requiring antibiotics at this time. Based on my clinical evaluation, I do not appreciate any immediate, emergent, or life threatening condition or etiology that warrants additional workup today and feel that the patient can be discharged with close follow up care. Case discussed with Dr. Pina who is agreeable to plan of care. Follow up and return precautions discussed; patient verbalized understanding and is agreeable to plan of care. Patient discharged home in stable condition.                      Medical Decision Making:   Differential Diagnosis:   Bronchitis  Viral URI  TB               Clinical Impression:  Final diagnoses:  [Z20.1] Exposure to TB  [Z20.1] Exposure to TB - cough          ED Disposition Condition    Discharge Stable          ED Prescriptions    None       Follow-up Information       Follow up With Specialties Details Why Contact Info Additional Information    Ayde Olson MD Infectious Diseases In 2 days  [address]  [address]  [place] [address]  [phone]       [place]  [facility] -  Emergency Medicine  As needed, If symptoms worsen [address] [place] [address] [address] [place] [id number] 1st floor             Peggy Frias, JUN  01/09/24 6023

## 2024-01-11 LAB
GAMMA INTERFERON BACKGROUND BLD IA-ACNC: NORMAL [IU]/ML
M TB IFN-G BLD-IMP: NORMAL
M TB IFN-G CD4+ BCKGRND COR BLD-ACNC: NORMAL [IU]/ML
M TB IFN-G CD4+CD8+ BCKGRND COR BLD-ACNC: NORMAL [IU]/ML
MITOGEN IGNF BCKGRD COR BLD-ACNC: NORMAL [IU]/ML

## 2024-01-17 ENCOUNTER — TELEPHONE (OUTPATIENT)
Dept: ORTHOPEDICS | Facility: CLINIC | Age: 34
End: 2024-01-17
Payer: MEDICAID

## 2024-01-17 NOTE — TELEPHONE ENCOUNTER
Advised patient that surgery not approved. She would like to call [facility] and hold off on R/S until she speaks with them. All questions answered. Patient verbalized understanding and was thankful.     Nimo Santoro MS, OTC  Clinical & OR Assistant to Dr. Ross Dunbar [facility]  [phone]

## 2024-01-18 ENCOUNTER — TELEPHONE (OUTPATIENT)
Dept: ORTHOPEDICS | Facility: CLINIC | Age: 34
End: 2024-01-18
Payer: MEDICAID

## 2024-01-18 RX ORDER — ACETAMINOPHEN 500 MG
1000 TABLET ORAL 3 TIMES DAILY
Qty: 21 TABLET | Refills: 0 | Status: SHIPPED | OUTPATIENT
Start: 2024-01-18 | End: 2024-04-16 | Stop reason: HOSPADM

## 2024-01-18 RX ORDER — OXYCODONE AND ACETAMINOPHEN 5; 325 MG/1; MG/1
1 TABLET ORAL EVERY 6 HOURS PRN
Qty: 20 TABLET | Refills: 0 | Status: SHIPPED | OUTPATIENT
Start: 2024-01-18 | End: 2024-01-23

## 2024-01-18 RX ORDER — IBUPROFEN 600 MG/1
600 TABLET ORAL 3 TIMES DAILY
Qty: 21 TABLET | Refills: 0 | Status: SHIPPED | OUTPATIENT
Start: 2024-01-18 | End: 2024-03-26

## 2024-01-18 NOTE — TELEPHONE ENCOUNTER
Spoke with patient. Case still not approved with [employer]. Patient elects to postpone until 2/2. Message sent to [facility] OR to move case. PO appt changed.     All questions answered. Patient verbalized understanding and was thankful.     Nimo Santoro MS, OTC  Clinical & OR Assistant to Dr. Ross Dunbar [facility]  [phone]

## 2024-01-30 RX ORDER — ACETAMINOPHEN 500 MG
1000 TABLET ORAL 3 TIMES DAILY
Qty: 21 TABLET | Refills: 0 | Status: SHIPPED | OUTPATIENT
Start: 2024-01-30 | End: 2024-04-16 | Stop reason: HOSPADM

## 2024-01-30 RX ORDER — IBUPROFEN 600 MG/1
600 TABLET ORAL 3 TIMES DAILY
Qty: 21 TABLET | Refills: 0 | Status: SHIPPED | OUTPATIENT
Start: 2024-01-30 | End: 2024-03-26

## 2024-01-30 RX ORDER — OXYCODONE AND ACETAMINOPHEN 5; 325 MG/1; MG/1
1 TABLET ORAL EVERY 6 HOURS PRN
Qty: 20 TABLET | Refills: 0 | Status: SHIPPED | OUTPATIENT
Start: 2024-01-30 | End: 2024-02-04

## 2024-02-03 ENCOUNTER — OFFICE VISIT (OUTPATIENT)
Dept: URGENT CARE | Facility: CLINIC | Age: 34
End: 2024-02-03
Payer: MEDICAID

## 2024-02-03 VITALS
RESPIRATION RATE: 18 BRPM | SYSTOLIC BLOOD PRESSURE: 112 MMHG | DIASTOLIC BLOOD PRESSURE: 74 MMHG | OXYGEN SATURATION: 100 % | BODY MASS INDEX: 28.04 KG/M2 | WEIGHT: 184.38 LBS | TEMPERATURE: 98 F | HEART RATE: 58 BPM

## 2024-02-03 DIAGNOSIS — J02.9 SORE THROAT: ICD-10-CM

## 2024-02-03 DIAGNOSIS — J06.9 VIRAL URI WITH COUGH: Primary | ICD-10-CM

## 2024-02-03 DIAGNOSIS — R09.81 NASAL CONGESTION: ICD-10-CM

## 2024-02-03 DIAGNOSIS — R05.1 ACUTE COUGH: ICD-10-CM

## 2024-02-03 PROCEDURE — 87811 SARS-COV-2 COVID19 W/OPTIC: CPT | Mod: QW,S$GLB,,

## 2024-02-03 PROCEDURE — 87804 INFLUENZA ASSAY W/OPTIC: CPT | Mod: QW,,,

## 2024-02-03 PROCEDURE — 87880 STREP A ASSAY W/OPTIC: CPT | Mod: QW,,,

## 2024-02-03 PROCEDURE — 99214 OFFICE O/P EST MOD 30 MIN: CPT | Mod: S$GLB,,,

## 2024-02-03 NOTE — PATIENT INSTRUCTIONS
Symptomatic treatment to include:     Rest, increase fluid intake to include electrolyte replacement  Ibuprofen/Tylenol as directed for fever, sore throat, body aches  Zrytec and flonase for sinus symptoms  Mucinex D over the counter as directed for sinus congestion.   Warm, salt water gargles, over the counter throat lozenges or sprays as desires.   ER for difficulty breathing not relieved by rest, excessive lethargy and/or change in mental status    Do not take polytussin while using zyrtec or any other medications that contain antihistamine or decongestants.

## 2024-02-03 NOTE — PROGRESS NOTES
Subjective:      Patient ID: Javier Anderson is a 33 y.o. female.    Vitals:  weight is 83.6 kg (184 lb 6.4 oz). Her temperature is 98.4 °F (36.9 °C). Her blood pressure is 112/74 and her pulse is 58 (abnormal). Her respiration is 18 and oxygen saturation is 100%.     Chief Complaint: Sore Throat    Symptoms started yesterday. Include sore throat, nasal congestion, and cough.           Constitution: Positive for chills and fatigue. Negative for fever.   HENT:  Positive for congestion, postnasal drip, sinus pressure and sore throat.    Neck: neck negative.   Cardiovascular: Negative.    Respiratory:  Positive for cough. Negative for sputum production, shortness of breath and wheezing.    Gastrointestinal: Negative.    Musculoskeletal: Negative.    Skin: Negative.    Neurological: Negative.    Psychiatric/Behavioral: Negative.        Objective:     Physical Exam   Constitutional: She is oriented to person, place, and time. She appears well-developed. She is cooperative.  Non-toxic appearance. She does not appear ill. No distress.   HENT:   Head: Normocephalic and atraumatic.   Ears:   Right Ear: Hearing and external ear normal.   Left Ear: Hearing and external ear normal.   Nose: Rhinorrhea and congestion present. No mucosal edema or nasal deformity. No epistaxis. Right sinus exhibits no maxillary sinus tenderness and no frontal sinus tenderness. Left sinus exhibits no maxillary sinus tenderness and no frontal sinus tenderness.   Mouth/Throat: Uvula is midline and mucous membranes are normal. Mucous membranes are moist. No trismus in the jaw. Normal dentition. No uvula swelling. Posterior oropharyngeal erythema present. No oropharyngeal exudate or posterior oropharyngeal edema. Tonsils are 2+ on the right. Tonsils are 1+ on the left.   Eyes: Conjunctivae and lids are normal. No scleral icterus.   Neck: Trachea normal and phonation normal. Neck supple. No edema present. No erythema present. No neck rigidity present.    Cardiovascular: Normal rate.   Pulmonary/Chest: Effort normal. No respiratory distress. She has no decreased breath sounds.   Abdominal: Normal appearance.   Musculoskeletal: Normal range of motion.         General: No deformity. Normal range of motion.   Neurological: She is alert and oriented to person, place, and time. She displays no weakness. She exhibits normal muscle tone.   Skin: Skin is warm, dry, intact, not diaphoretic and not pale.   Psychiatric: Her speech is normal and behavior is normal. Mood, judgment and thought content normal.   Nursing note and vitals reviewed.      Assessment:     1. Viral URI with cough    2. Sore throat    3. Nasal congestion    4. Acute cough        Plan:       Viral URI with cough    Sore throat  -     POCT rapid strep A  -     SARS Coronavirus 2 Antigen, POCT Manual Read  -     POCT Influenza A/B Rapid Antigen  -     benzocaine-menthoL 15-3.6 mg Lozg; 1 lozenge by Mucous Membrane route every 2 (two) hours as needed (sore throat).  Dispense: 36 lozenge; Refill: 0    Nasal congestion    Acute cough  -     pyrilamine-phenylephrine-DM 12.5-5-7.5 mg/5 mL Liqd; Take 10 mLs by mouth every 6 (six) hours as needed (cough).  Dispense: 350 mL; Refill: 0      COVID: neg  FLU: neg  STREP: neg    Discussed medication with patient who acknowledges understanding and is agreeable to POC. Follow up with primary care. Increase fluid intake. Red flags for ER discussed.

## 2024-02-19 RX ORDER — ACETAMINOPHEN 500 MG
1000 TABLET ORAL 3 TIMES DAILY
Qty: 21 TABLET | Refills: 0 | Status: SHIPPED | OUTPATIENT
Start: 2024-02-19 | End: 2024-04-16 | Stop reason: HOSPADM

## 2024-02-19 RX ORDER — IBUPROFEN 600 MG/1
600 TABLET ORAL 3 TIMES DAILY
Qty: 21 TABLET | Refills: 0 | Status: SHIPPED | OUTPATIENT
Start: 2024-02-19 | End: 2024-03-26

## 2024-02-19 RX ORDER — OXYCODONE AND ACETAMINOPHEN 5; 325 MG/1; MG/1
1 TABLET ORAL EVERY 6 HOURS PRN
Qty: 20 TABLET | Refills: 0 | Status: SHIPPED | OUTPATIENT
Start: 2024-02-19 | End: 2024-02-24

## 2024-02-23 ENCOUNTER — TELEPHONE (OUTPATIENT)
Dept: ORTHOPEDICS | Facility: CLINIC | Age: 34
End: 2024-02-23
Payer: MEDICAID

## 2024-02-23 NOTE — TELEPHONE ENCOUNTER
Spoke with the patient. She has been unable to get in contact with [facility]. We will cancel her case for today and re-book her once we receive authorization. Message sent to [facility] OR to depot case.     All questions answered. Patient verbalized understanding and was thankful.     Nimo Santoro MS, OTC  Clinical & OR Assistant to Dr. Ross Dunbar [facility]  [phone]

## 2024-02-23 NOTE — TELEPHONE ENCOUNTER
"Spoke with the patient. Notified that her case is still pending authorization. She states she spoke with [employer] and they told her the case is "not denied".  No documentation has been provided to [facility] pre-service or  to confirm this. Spoke with the pre-service rep working her case (Kvaita Choe) as well as Jem Pack with  and they both stated no approval has been received or documented. [employer] portal status reads "pending further development" since 1/11/2024. Informed patient that she or [employer] will need to email approval status by 10am to Kavita Choe in order to proceed. If approved, she will arrive at 1pm for surgery. All questions answered. Patient verbalized understanding and was thankful.    Nimo Santoro MS, OTC  Clinical & OR Assistant to Dr. Ross Dunbar [facility]  [phone]    "

## 2024-03-21 ENCOUNTER — TELEPHONE (OUTPATIENT)
Dept: ORTHOPEDICS | Facility: CLINIC | Age: 34
End: 2024-03-21
Payer: MEDICAID

## 2024-03-21 DIAGNOSIS — M86.149 OTHER ACUTE OSTEOMYELITIS, UNSPECIFIED HAND: Primary | ICD-10-CM

## 2024-03-21 DIAGNOSIS — S62.632D OPEN DISPLACED FRACTURE OF DISTAL PHALANX OF RIGHT MIDDLE FINGER WITH ROUTINE HEALING, SUBSEQUENT ENCOUNTER: ICD-10-CM

## 2024-03-21 NOTE — TELEPHONE ENCOUNTER
Spoke with the patient. She states she has received her approval letter from [employer]. New DOS 4/17. All questions answered. Patient verbalized understanding and was thankful.     Nimo Santoro MS, OTC  Clinical & OR Assistant to Dr. Ross Dunbar [facility]  [phone]      ----- Message from Nimo Santoro sent at 3/15/2024  1:01 PM CDT -----    ----- Message -----  From: Vita Singh  Sent: 3/15/2024  12:33 PM CDT  To: Elizabeth Lipscomb Staff    CLAIRE KHAN calling regarding wanting to inform the doctor she has received approval letter for amputation. Please call back to inform when pt can schedule for procedure [phone]

## 2024-03-26 DIAGNOSIS — S67.10XA OPEN CRUSHING INJURY OF FINGER: ICD-10-CM

## 2024-03-26 DIAGNOSIS — S61.209A OPEN CRUSHING INJURY OF FINGER: ICD-10-CM

## 2024-03-26 RX ORDER — TRAMADOL HYDROCHLORIDE 50 MG/1
50 TABLET ORAL EVERY 6 HOURS
Qty: 28 TABLET | Refills: 0 | Status: SHIPPED | OUTPATIENT
Start: 2024-03-26 | End: 2024-04-16 | Stop reason: HOSPADM

## 2024-03-26 RX ORDER — IBUPROFEN 800 MG/1
800 TABLET ORAL 3 TIMES DAILY
Qty: 90 TABLET | Refills: 2 | Status: SHIPPED | OUTPATIENT
Start: 2024-03-26 | End: 2024-04-16 | Stop reason: HOSPADM

## 2024-03-27 ENCOUNTER — TELEPHONE (OUTPATIENT)
Dept: ORTHOPEDICS | Facility: CLINIC | Age: 34
End: 2024-03-27
Payer: MEDICAID

## 2024-03-27 NOTE — TELEPHONE ENCOUNTER
----- Message -----   From: Adamaris Cook   Sent: 3/27/2024   9:57 AM CDT   To: Elizabeth Lipscomb Staff   Subject: Outpatient Procedure [id number]                      [id number]   PROVIDER#[id number] ([facility])   AMPUTATION, FINGER - right long finger through P2   Pr Amputation Finger/Thumb [70101]  Auth starts [id number] Expires [id number]

## 2024-04-03 DIAGNOSIS — M86.149 OTHER ACUTE OSTEOMYELITIS, UNSPECIFIED HAND: Primary | ICD-10-CM

## 2024-04-03 DIAGNOSIS — S68.522A PARTIAL TRAUMATIC AMPUTATION OF LEFT THUMB THROUGH PHALANX, INITIAL ENCOUNTER: ICD-10-CM

## 2024-04-03 RX ORDER — CEFAZOLIN SODIUM 2 G/50ML
2 SOLUTION INTRAVENOUS
Status: CANCELLED | OUTPATIENT
Start: 2024-04-03

## 2024-04-03 RX ORDER — MUPIROCIN 20 MG/G
OINTMENT TOPICAL
Status: CANCELLED | OUTPATIENT
Start: 2024-04-03

## 2024-04-12 ENCOUNTER — ANESTHESIA EVENT (OUTPATIENT)
Dept: SURGERY | Facility: HOSPITAL | Age: 34
End: 2024-04-12
Payer: OTHER GOVERNMENT

## 2024-04-16 ENCOUNTER — TELEPHONE (OUTPATIENT)
Dept: ORTHOPEDICS | Facility: CLINIC | Age: 34
End: 2024-04-16
Payer: MEDICAID

## 2024-04-16 RX ORDER — ACETAMINOPHEN 500 MG
1000 TABLET ORAL 2 TIMES DAILY
Qty: 20 TABLET | Refills: 0 | Status: SHIPPED | OUTPATIENT
Start: 2024-04-16

## 2024-04-16 RX ORDER — IBUPROFEN 600 MG/1
600 TABLET ORAL 3 TIMES DAILY
Qty: 20 TABLET | Refills: 0 | Status: SHIPPED | OUTPATIENT
Start: 2024-04-16 | End: 2024-05-21 | Stop reason: SDUPTHER

## 2024-04-16 RX ORDER — OXYCODONE AND ACETAMINOPHEN 5; 325 MG/1; MG/1
1 TABLET ORAL EVERY 4 HOURS PRN
Qty: 10 TABLET | Refills: 0 | Status: SHIPPED | OUTPATIENT
Start: 2024-04-16 | End: 2024-04-26 | Stop reason: SDUPTHER

## 2024-04-16 NOTE — TELEPHONE ENCOUNTER
Spoke with the patient. Notified of 8 AM arrival time to the [facility].  Informed of current visitor policy.  Reminded of NPO and need for transportation. Patient verbalized understanding to all.    Nimo Santoro MS, OTC  Clinical Assistant to Dr. Ross Dunbar [facility]

## 2024-04-16 NOTE — TELEPHONE ENCOUNTER
Spoke with the patient. Notified of 8 AM arrival time to the [facility].  Informed of current visitor policy.  Reminded of NPO and need for transportation. Patient verbalized understanding to all.    Nimo Santoro MS, OTC  Clinical Assistant to Dr. Ross Dunbar [facility]

## 2024-04-17 ENCOUNTER — ANESTHESIA (OUTPATIENT)
Dept: SURGERY | Facility: HOSPITAL | Age: 34
End: 2024-04-17
Payer: OTHER GOVERNMENT

## 2024-04-17 ENCOUNTER — HOSPITAL ENCOUNTER (OUTPATIENT)
Facility: HOSPITAL | Age: 34
Discharge: HOME | End: 2024-04-17
Attending: ORTHOPAEDIC SURGERY | Admitting: ORTHOPAEDIC SURGERY
Payer: OTHER GOVERNMENT

## 2024-04-17 VITALS
OXYGEN SATURATION: 100 % | WEIGHT: 184 LBS | RESPIRATION RATE: 56 BRPM | HEART RATE: 56 BPM | DIASTOLIC BLOOD PRESSURE: 72 MMHG | HEIGHT: 68 IN | SYSTOLIC BLOOD PRESSURE: 133 MMHG | BODY MASS INDEX: 27.89 KG/M2 | TEMPERATURE: 99 F

## 2024-04-17 DIAGNOSIS — M86.149 OTHER ACUTE OSTEOMYELITIS, UNSPECIFIED HAND: ICD-10-CM

## 2024-04-17 DIAGNOSIS — S68.522A PARTIAL TRAUMATIC AMPUTATION OF LEFT THUMB THROUGH PHALANX, INITIAL ENCOUNTER: Primary | ICD-10-CM

## 2024-04-17 LAB
B-HCG UR QL: NEGATIVE
CTP QC/QA: YES
GRAM STN SPEC: NORMAL
GRAM STN SPEC: NORMAL

## 2024-04-17 PROCEDURE — 37000008 HC ANESTHESIA 1ST 15 MINUTES: Performed by: ORTHOPAEDIC SURGERY

## 2024-04-17 PROCEDURE — 87205 SMEAR GRAM STAIN: CPT | Performed by: ORTHOPAEDIC SURGERY

## 2024-04-17 PROCEDURE — 88305 TISSUE EXAM BY PATHOLOGIST: CPT | Mod: 26,,, | Performed by: STUDENT IN AN ORGANIZED HEALTH CARE EDUCATION/TRAINING PROGRAM

## 2024-04-17 PROCEDURE — 87102 FUNGUS ISOLATION CULTURE: CPT | Performed by: ORTHOPAEDIC SURGERY

## 2024-04-17 PROCEDURE — D9220A PRA ANESTHESIA: Mod: CRNA,,, | Performed by: NURSE ANESTHETIST, CERTIFIED REGISTERED

## 2024-04-17 PROCEDURE — 88305 TISSUE EXAM BY PATHOLOGIST: CPT | Performed by: STUDENT IN AN ORGANIZED HEALTH CARE EDUCATION/TRAINING PROGRAM

## 2024-04-17 PROCEDURE — 36000706: Performed by: ORTHOPAEDIC SURGERY

## 2024-04-17 PROCEDURE — 25000003 PHARM REV CODE 250: Performed by: NURSE PRACTITIONER

## 2024-04-17 PROCEDURE — 25000003 PHARM REV CODE 250: Performed by: PHYSICIAN ASSISTANT

## 2024-04-17 PROCEDURE — 63600175 PHARM REV CODE 636 W HCPCS: Performed by: NURSE PRACTITIONER

## 2024-04-17 PROCEDURE — 26951 AMPUTATION OF FINGER/THUMB: CPT | Mod: F7,,, | Performed by: ORTHOPAEDIC SURGERY

## 2024-04-17 PROCEDURE — 63600175 PHARM REV CODE 636 W HCPCS: Performed by: NURSE ANESTHETIST, CERTIFIED REGISTERED

## 2024-04-17 PROCEDURE — 99900035 HC TECH TIME PER 15 MIN (STAT)

## 2024-04-17 PROCEDURE — 63600175 PHARM REV CODE 636 W HCPCS: Performed by: ANESTHESIOLOGY

## 2024-04-17 PROCEDURE — D9220A PRA ANESTHESIA: Mod: ANES,,, | Performed by: ANESTHESIOLOGY

## 2024-04-17 PROCEDURE — 88311 DECALCIFY TISSUE: CPT | Performed by: STUDENT IN AN ORGANIZED HEALTH CARE EDUCATION/TRAINING PROGRAM

## 2024-04-17 PROCEDURE — 94761 N-INVAS EAR/PLS OXIMETRY MLT: CPT

## 2024-04-17 PROCEDURE — 88311 DECALCIFY TISSUE: CPT | Mod: 26,,, | Performed by: STUDENT IN AN ORGANIZED HEALTH CARE EDUCATION/TRAINING PROGRAM

## 2024-04-17 PROCEDURE — 88302 TISSUE EXAM BY PATHOLOGIST: CPT | Performed by: STUDENT IN AN ORGANIZED HEALTH CARE EDUCATION/TRAINING PROGRAM

## 2024-04-17 PROCEDURE — 27201423 OPTIME MED/SURG SUP & DEVICES STERILE SUPPLY: Performed by: ORTHOPAEDIC SURGERY

## 2024-04-17 PROCEDURE — 87116 MYCOBACTERIA CULTURE: CPT | Performed by: ORTHOPAEDIC SURGERY

## 2024-04-17 PROCEDURE — 36000707: Performed by: ORTHOPAEDIC SURGERY

## 2024-04-17 PROCEDURE — 71000015 HC POSTOP RECOV 1ST HR: Performed by: ORTHOPAEDIC SURGERY

## 2024-04-17 PROCEDURE — 87070 CULTURE OTHR SPECIMN AEROBIC: CPT | Performed by: ORTHOPAEDIC SURGERY

## 2024-04-17 PROCEDURE — 87075 CULTR BACTERIA EXCEPT BLOOD: CPT | Performed by: ORTHOPAEDIC SURGERY

## 2024-04-17 PROCEDURE — 64415 NJX AA&/STRD BRCH PLXS IMG: CPT | Performed by: ANESTHESIOLOGY

## 2024-04-17 PROCEDURE — 71000033 HC RECOVERY, INTIAL HOUR: Performed by: ORTHOPAEDIC SURGERY

## 2024-04-17 PROCEDURE — 25000003 PHARM REV CODE 250: Performed by: NURSE ANESTHETIST, CERTIFIED REGISTERED

## 2024-04-17 PROCEDURE — 37000009 HC ANESTHESIA EA ADD 15 MINS: Performed by: ORTHOPAEDIC SURGERY

## 2024-04-17 PROCEDURE — 87206 SMEAR FLUORESCENT/ACID STAI: CPT | Performed by: ORTHOPAEDIC SURGERY

## 2024-04-17 RX ORDER — CELECOXIB 200 MG/1
400 CAPSULE ORAL
Status: COMPLETED | OUTPATIENT
Start: 2024-04-17 | End: 2024-04-17

## 2024-04-17 RX ORDER — MUPIROCIN 20 MG/G
OINTMENT TOPICAL
Status: DISCONTINUED | OUTPATIENT
Start: 2024-04-17 | End: 2024-04-17 | Stop reason: HOSPADM

## 2024-04-17 RX ORDER — HALOPERIDOL 5 MG/ML
0.5 INJECTION INTRAMUSCULAR EVERY 10 MIN PRN
Status: DISCONTINUED | OUTPATIENT
Start: 2024-04-17 | End: 2024-04-17 | Stop reason: HOSPADM

## 2024-04-17 RX ORDER — FENTANYL CITRATE 50 UG/ML
25 INJECTION, SOLUTION INTRAMUSCULAR; INTRAVENOUS EVERY 5 MIN PRN
Status: DISCONTINUED | OUTPATIENT
Start: 2024-04-17 | End: 2024-04-17 | Stop reason: HOSPADM

## 2024-04-17 RX ORDER — LIDOCAINE HYDROCHLORIDE 20 MG/ML
INJECTION INTRAVENOUS
Status: DISCONTINUED | OUTPATIENT
Start: 2024-04-17 | End: 2024-04-17

## 2024-04-17 RX ORDER — ACETAMINOPHEN 500 MG
1000 TABLET ORAL
Status: COMPLETED | OUTPATIENT
Start: 2024-04-17 | End: 2024-04-17

## 2024-04-17 RX ORDER — DEXMEDETOMIDINE HYDROCHLORIDE 100 UG/ML
INJECTION, SOLUTION INTRAVENOUS
Status: DISCONTINUED | OUTPATIENT
Start: 2024-04-17 | End: 2024-04-17

## 2024-04-17 RX ORDER — SODIUM CHLORIDE 0.9 % (FLUSH) 0.9 %
10 SYRINGE (ML) INJECTION
Status: DISCONTINUED | OUTPATIENT
Start: 2024-04-17 | End: 2024-04-17 | Stop reason: HOSPADM

## 2024-04-17 RX ORDER — ROPIVACAINE HYDROCHLORIDE 5 MG/ML
INJECTION, SOLUTION EPIDURAL; INFILTRATION; PERINEURAL
Status: COMPLETED | OUTPATIENT
Start: 2024-04-17 | End: 2024-04-17

## 2024-04-17 RX ORDER — ONDANSETRON HYDROCHLORIDE 2 MG/ML
INJECTION, SOLUTION INTRAVENOUS
Status: DISCONTINUED | OUTPATIENT
Start: 2024-04-17 | End: 2024-04-17

## 2024-04-17 RX ORDER — MIDAZOLAM HYDROCHLORIDE 1 MG/ML
1 INJECTION, SOLUTION INTRAMUSCULAR; INTRAVENOUS
Status: DISCONTINUED | OUTPATIENT
Start: 2024-04-17 | End: 2024-04-17 | Stop reason: HOSPADM

## 2024-04-17 RX ORDER — CEFAZOLIN 2 G/1
INJECTION, POWDER, FOR SOLUTION INTRAMUSCULAR; INTRAVENOUS
Status: DISCONTINUED | OUTPATIENT
Start: 2024-04-17 | End: 2024-04-17

## 2024-04-17 RX ORDER — DEXAMETHASONE SODIUM PHOSPHATE 4 MG/ML
INJECTION, SOLUTION INTRA-ARTICULAR; INTRALESIONAL; INTRAMUSCULAR; INTRAVENOUS; SOFT TISSUE
Status: DISCONTINUED | OUTPATIENT
Start: 2024-04-17 | End: 2024-04-17

## 2024-04-17 RX ORDER — IBUPROFEN 200 MG
200 TABLET ORAL
COMMUNITY

## 2024-04-17 RX ORDER — METHOCARBAMOL 500 MG/1
1000 TABLET, FILM COATED ORAL ONCE
Status: DISCONTINUED | OUTPATIENT
Start: 2024-04-17 | End: 2024-04-17 | Stop reason: HOSPADM

## 2024-04-17 RX ORDER — OXYCODONE HYDROCHLORIDE 5 MG/1
5 TABLET ORAL
Status: DISCONTINUED | OUTPATIENT
Start: 2024-04-17 | End: 2024-04-17 | Stop reason: HOSPADM

## 2024-04-17 RX ORDER — PROPOFOL 10 MG/ML
VIAL (ML) INTRAVENOUS
Status: DISCONTINUED | OUTPATIENT
Start: 2024-04-17 | End: 2024-04-17

## 2024-04-17 RX ORDER — PROPOFOL 10 MG/ML
VIAL (ML) INTRAVENOUS CONTINUOUS PRN
Status: DISCONTINUED | OUTPATIENT
Start: 2024-04-17 | End: 2024-04-17

## 2024-04-17 RX ADMIN — PROPOFOL 100 MCG/KG/MIN: 10 INJECTION, EMULSION INTRAVENOUS at 10:04

## 2024-04-17 RX ADMIN — MIDAZOLAM HYDROCHLORIDE 2 MG: 1 INJECTION, SOLUTION INTRAMUSCULAR; INTRAVENOUS at 09:04

## 2024-04-17 RX ADMIN — SODIUM CHLORIDE: 9 INJECTION, SOLUTION INTRAVENOUS at 10:04

## 2024-04-17 RX ADMIN — CEFAZOLIN 2 G: 2 INJECTION, POWDER, FOR SOLUTION INTRAMUSCULAR; INTRAVENOUS at 11:04

## 2024-04-17 RX ADMIN — DEXMEDETOMIDINE 8 MCG: 100 INJECTION, SOLUTION, CONCENTRATE INTRAVENOUS at 10:04

## 2024-04-17 RX ADMIN — MUPIROCIN: 20 OINTMENT TOPICAL at 08:04

## 2024-04-17 RX ADMIN — FENTANYL CITRATE 100 MCG: 50 INJECTION INTRAMUSCULAR; INTRAVENOUS at 09:04

## 2024-04-17 RX ADMIN — ROPIVACAINE HYDROCHLORIDE 30 ML: 5 INJECTION EPIDURAL; INFILTRATION; PERINEURAL at 09:04

## 2024-04-17 RX ADMIN — PROPOFOL 50 MG: 10 INJECTION, EMULSION INTRAVENOUS at 10:04

## 2024-04-17 RX ADMIN — LIDOCAINE HYDROCHLORIDE 50 MG: 20 INJECTION INTRAVENOUS at 10:04

## 2024-04-17 RX ADMIN — DEXAMETHASONE SODIUM PHOSPHATE 4 MG: 4 INJECTION, SOLUTION INTRAMUSCULAR; INTRAVENOUS at 11:04

## 2024-04-17 RX ADMIN — CELECOXIB 400 MG: 200 CAPSULE ORAL at 08:04

## 2024-04-17 RX ADMIN — ACETAMINOPHEN 1000 MG: 500 TABLET ORAL at 08:04

## 2024-04-17 RX ADMIN — ONDANSETRON 4 MG: 2 INJECTION INTRAMUSCULAR; INTRAVENOUS at 11:04

## 2024-04-17 NOTE — H&P
[facility]  History & Physical  Orthopedics     SUBJECTIVE:       Chief Complaint: Right long finger injury     Referring Provider: No ref. provider found      Dr. Johnson is the supervising physician for this encounter/patient     History of Present Illness:  Patient is a 33 y.o. right hand dominant female who presents today in follow-up from her right long finger injury.  She was recently sent for MRI over persistent pain, deformity, and concern for infection and returns for those results and re-evaluation.  No other new complaints.  Pain is still severe and she rates it 8/10 in severity today.     Onset of symptoms/DOI was 6/21/23.     Symptoms are aggravated by activity and movement.     Symptoms are alleviated by  none .     Symptoms consist of pain and deformity     The patient rates their pain as 8/10     Attempted treatment(s) and/or interventions include surgery, activity modifications, rest, antibiotic therapy and wound dressing .     The patient denies any fevers, chills, N/V, D/C and presents for evaluation.                Past Medical History:   Diagnosis Date    COVID-19                  Past Surgical History:   Procedure Laterality Date    DILATION AND CURETTAGE OF UTERUS        EYE SURGERY        FINGER TENDON REPAIR Right 6/23/2023     Procedure: REPAIR, TENDON, FINGER - Right Long finger I&D, poss tendon repair, poss skin graft vs integra;  Surgeon: Ruel Johnson MD;  Location: [facility];  Service: Orthopedics;  Laterality: Right;      Review of patient's allergies indicates:  No Known Allergies  Social History            Social History Narrative    Not on file      No family history on file.        Current Outpatient Medications:     acetaminophen (TYLENOL) 500 MG tablet, Take 1 tablet (500 mg total) by mouth every 6 (six) hours as needed for Pain., Disp: 20 tablet, Rfl: 0    amoxicillin (AMOXIL) 500 MG capsule, Take 500 mg by mouth 3 (three) times daily., Disp: , Rfl:      azithromycin (Z-GIANCARLO) 250 MG tablet, Take 2 tablets by mouth on day 1; Take 1 tablet by mouth on days 2-5, Disp: 6 tablet, Rfl: 0    gabapentin (NEURONTIN) 300 MG capsule, Take 1 capsule (300 mg total) by mouth 3 (three) times daily. for 7 days, Disp: 21 capsule, Rfl: 0    ibuprofen (ADVIL,MOTRIN) 800 MG tablet, Take 1 tablet (800 mg total) by mouth 3 (three) times daily., Disp: 90 tablet, Rfl: 2    medroxyPROGESTERone (DEPO-PROVERA) 150 mg/mL Syrg, Inject 150 mg into the muscle., Disp: , Rfl:     naloxone (NARCAN) 4 mg/actuation Spry, 4mg by nasal route as needed for opioid overdose; may repeat every 2-3 minutes in alternating nostrils until medical help arrives. Call 911, Disp: 1 each, Rfl: 11    oxyCODONE (ROXICODONE) 5 MG immediate release tablet, Take 1 tablet (5 mg total) by mouth every 6 (six) hours as needed for Pain. May take 1-2 tablets every 6 hours as needed for pain, Disp: 15 tablet, Rfl: 0    oxyCODONE-acetaminophen (PERCOCET) 5-325 mg per tablet, Take 1 tablet by mouth every 4 (four) hours as needed for Pain., Disp: 28 tablet, Rfl: 0    promethazine HCl (PHENERGAN ORAL), Take by mouth., Disp: , Rfl:     promethazine-dextromethorphan (PROMETHAZINE-DM) 6.25-15 mg/5 mL Syrp, SMARTSIG:10 Milliliter(s) By Mouth Every 8 Hours PRN, Disp: , Rfl:     traMADoL (ULTRAM) 50 mg tablet, Take 1 tablet (50 mg total) by mouth every 6 (six) hours., Disp: 20 tablet, Rfl: 0    traMADoL (ULTRAM) 50 mg tablet, Take 1 tablet (50 mg total) by mouth every 6 (six) hours., Disp: 28 tablet, Rfl: 0  No current facility-administered medications for this visit.     Facility-Administered Medications Ordered in Other Visits:     fentaNYL 50 mcg/mL injection  mcg,  mcg, Intravenous, PRN, Ramy Ramirez MD, 100 mcg at 06/23/23 1220    haloperidol lactate injection 0.5 mg, 0.5 mg, Intravenous, Q10 Min PRN, Candelaria Parker MD    methocarbamoL tablet 1,000 mg, 1,000 mg, Oral, Once, Candelaria Parker MD    midazolam (VERSED)  1 mg/mL injection 0.5-4 mg, 0.5-4 mg, Intravenous, PRN, Ramy Ramirez MD, 2 mg at 06/23/23 1220    ondansetron injection 4 mg, 4 mg, Intravenous, Daily PRN, Candelaria Parker MD    sodium chloride 0.9% flush 10 mL, 10 mL, Intravenous, PRN, Candelaria Parker MD        Review of Systems:  Constitutional: no fever or chills  Eyes: no visual changes  ENT: no nasal congestion or sore throat  Respiratory: no cough or shortness of breath  Cardiovascular: no chest pain  Gastrointestinal: no nausea or vomiting, tolerating diet  Musculoskeletal: pain, soreness, and wound     OBJECTIVE:       Vital Signs (Most Recent):  Vitals   There were no vitals filed for this visit.      There is no height or weight on file to calculate BMI.        Physical Exam:  Constitutional: The patient appears well-developed and well-nourished. No distress.   Skin: No lesions appreciated  Head: Normocephalic and atraumatic.   Nose: Nose normal.   Ears: No deformities seen  Eyes: Conjunctivae and EOM are normal.   Neck: No tracheal deviation present.   Cardiovascular: Normal rate and intact distal pulses.    Pulmonary/Chest: Effort normal. No respiratory distress.   Abdominal: There is no guarding.   Neurological: The patient is alert.   Psychiatric: The patient has a normal mood and affect.      Right Hand/Wrist Examination:     Observation/Inspection:  Swelling                       none                  Deformity                     none  Discoloration               none                  Scars                           none                  Atrophy                        none  Large surface of the dorsal DIP long finger is open/exposed, no current bleeding, exposed bone present     HAND/WRIST EXAMINATION:  Finkelstein's Test                                Neg  WHAT Test                                         Neg  Snuff box tenderness                          Neg  Cardenas's Test                                     Neg  Hook of Hamate Tenderness               Neg  CMC grind                                           Neg  Circumduction test                              Neg     Neurovascular Exam:  Digits WWP, brisk CR < 3s throughout  NVI motor/LTS to M/R/U nerves, radial pulse 2+     ROM hand: not assessed due to condition     ROM wrist full, painless    ROM elbow full, painless     Abdomen not guarded  Respirations nonlabored  Perfusion intact        Diagnostic Results:     MRI right long finger:Impression:     Postsurgical changes related to extensor tendon repair at the level of the middle finger DIP joint.  Osteomyelitis and septic arthritis at the DIP joint, as above.     X-ray right long finger-FINDINGS:  Stable postsurgical changes related to a surgical procedure performed June 23, 2023 which included and extensor tendon repair.  A short pin is again noted within the base of the distal phalanx of the right middle finger in stable position.  Stable focus of lucency and irregularity involving the dorsal aspect of the distal aspect of the middle phalanx.  Clinical correlation.  As before, on the 3 images acquired, there is mild extension of the middle phalanx with respect to the proximal phalanx and flexion of the distal phalanx with respect to the middle phalanx.  Clinical correlation.           ASSESSMENT/PLAN:       33 y.o. yo female with Right long finger osteomyelitis status post prior crush injury with exposed bone and surgical reconstruction     Plan: The patient and I had a thorough discussion today.  We discussed the working diagnosis as well as several other potential alternative diagnoses.  Treatment options were discussed, both conservative and surgical.  Conservative treatment options would include things such as activity modifications, anti-inflammatory medications, occupational therapy, splinting/bracing, corticosteroid injections, and others.  Surgical options were discussed as well.     At this point in time, he onto certainly does have  osteomyelitis in addition to persistent deformity and pain of the right long finger.  We discussed further attempts at salvage including deformity correction, long-term IV antibiotics, and attempted preservation of the digit with incision and drainage and bone biopsy/cultures.  We discussed this option versus amputation of the right long finger.  At this point in time she would like to proceed with a partial amputation of the right long finger on January 19, 2024 as I would suspect this will allow her a more quick recovery then attempted salvage.  As such I will get this scheduled.     The patient has not responded to adequate non operative treatment at this time and/or non operative treatment is not indicated. Thus, the risks, benefits and alternatives to surgery were discussed with the patient in detail.  Specific risks include but are not limited to bleeding, infection, vessel and/or nerve damage, pain, numbness, tingling, compartment syndrome, need for additional surgery, failure to return to pre-injury and/or preoperative functional status, inability to return to work, scar sensitivity, delayed healing, complex regional pain syndrome, weakness, pulley injury, tendon injury, bowstringing, partial and/or incomplete relief of symptoms, persistence of and/or worsening of symptoms, hardware and/or surgical failure, prominent and/or symptomatic hardware possibly necessitating future removal, osteomyelitis, amputation, loss of function, stiffness, rotational malalignment, functional debility, dysfunction, decreased  strength, need for prolonged postoperative rehabilitation, malunion, nonunion, deep venous thrombosis, pulmonary embolism, arthritis and death.  The patient states an understanding and wishes to proceed with surgery.   All questions were answered.  No guarantees were implied or stated.  Written informed consent was obtained.        Please do not hesitate to reach out to us via email, phone, or Petsy  with any questions, concerns, or feedback.

## 2024-04-17 NOTE — TRANSFER OF CARE
"Anesthesia Transfer of Care Note    Patient: Javier Anderson    Procedure(s) Performed: Procedure(s) (LRB):  PARTIAL AMPUTATION, FINGER - right long finger (Right)    Patient location: PACU    Anesthesia Type: MAC    Transport from OR: Transported from OR on 6-10 L/min O2 by face mask with adequate spontaneous ventilation    Post pain: adequate analgesia    Post assessment: no apparent anesthetic complications and tolerated procedure well    Post vital signs: stable    Level of consciousness: awake    Nausea/Vomiting: no nausea/vomiting    Complications: none    Transfer of care protocol was followed      Last vitals: Visit Vitals  /70 (BP Location: Left arm, Patient Position: Lying)   Pulse 64   Temp 36.7 °C (98.1 °F) (Tympanic)   Resp 20   Ht 5' 8" (1.727 m)   Wt 83.5 kg (184 lb)   LMP 03/29/2024 (Exact Date)   SpO2 100%   Breastfeeding No   BMI 27.98 kg/m²     "

## 2024-04-17 NOTE — ANESTHESIA PREPROCEDURE EVALUATION
04/17/2024  Javier Anderson is a 33 y.o., female.    Patient Active Problem List   Diagnosis    Pain of finger of right hand    Healing wound    Range of motion deficit     Past Surgical History:   Procedure Laterality Date    DILATION AND CURETTAGE OF UTERUS      EYE SURGERY      FINGER AMPUTATION Right 2/23/2024    Procedure: AMPUTATION, FINGER - right long finger through P2;  Surgeon: Ruel Johnson MD;  Location: [facility] OR;  Service: Orthopedics;  Laterality: Right;    FINGER TENDON REPAIR Right 6/23/2023    Procedure: REPAIR, TENDON, FINGER - Right Long finger I&D, poss tendon repair, poss skin graft vs integra;  Surgeon: Ruel Johnson MD;  Location: [facility] OR;  Service: Orthopedics;  Laterality: Right;       Pre-op Assessment    I have reviewed the Patient Summary Reports.     I have reviewed the Nursing Notes. I have reviewed the NPO Status.   I have reviewed the Medications.     Review of Systems      Physical Exam  General: Well nourished    Airway:  Mallampati: II   Mouth Opening: Normal  TM Distance: Normal  Tongue: Normal  Neck ROM: Normal ROM        Anesthesia Plan  Type of Anesthesia, risks & benefits discussed:    Anesthesia Type: Gen Natural Airway, MAC, Regional  Intra-op Monitoring Plan: Standard ASA Monitors  Post Op Pain Control Plan: multimodal analgesia  Induction:  IV  Informed Consent: Patient consented to blood products? No  ASA Score: 1  Day of Surgery Review of History & Physical: H&P Update referred to the surgeon/provider.    Ready For Surgery From Anesthesia Perspective.     .

## 2024-04-17 NOTE — ANESTHESIA PROCEDURE NOTES
Peripheral Block    Patient location during procedure: pre-op   Block not for primary anesthetic.  Reason for block: at surgeon's request and post-op pain management   Post-op Pain Location: right arm   Start time: 4/17/2024 9:30 AM  Timeout: 4/17/2024 9:30 AM   End time: 4/17/2024 9:45 AM    Staffing  Authorizing Provider: Camilla Hsu MD  Performing Provider: Camilla Hsu MD    Staffing  Performed by: Camilla Hsu MD  Authorized by: Camilla Hsu MD    Preanesthetic Checklist  Completed: patient identified, IV checked, site marked, risks and benefits discussed, surgical consent, monitors and equipment checked, pre-op evaluation and timeout performed  Peripheral Block  Patient position: supine  Prep: ChloraPrep  Patient monitoring: heart rate, cardiac monitor, continuous pulse ox, continuous capnometry and frequent blood pressure checks  Block type: supraclavicular  Laterality: right  Injection technique: single shot  Needle  Needle type: Stimuplex   Needle gauge: 22 G  Needle length: 2 in  Needle localization: anatomical landmarks and ultrasound guidance   -ultrasound image captured on disc.  Assessment  Injection assessment: negative aspiration, negative parasthesia and local visualized surrounding nerve  Paresthesia pain: none  Heart rate change: no  Slow fractionated injection: yes    Medications:    Medications: ropivacaine (NAROPIN) injection 0.5% - Perineural   30 mL - 4/17/2024 9:35:00 AM    Additional Notes  VSS.  [facility] RN monitoring vitals throughout procedure.  Patient tolerated procedure well.

## 2024-04-17 NOTE — DISCHARGE SUMMARY
[facility] - Surgery (Hospital)  Discharge Note  Short Stay    Procedure(s) (LRB):  PARTIAL AMPUTATION, FINGER - right long finger (Right)      OUTCOME: Patient tolerated treatment/procedure well without complication and is now ready for discharge.    DISPOSITION: Home or Self Care    FINAL DIAGNOSIS:  Osteomyelitis of right long finger     FOLLOWUP: In clinic    DISCHARGE INSTRUCTIONS:    Discharge Procedure Orders   Diet general     Call MD for:  temperature >100.4     Call MD for:  persistent nausea and vomiting     Call MD for:  severe uncontrolled pain     Call MD for:  difficulty breathing, headache or visual disturbances     Call MD for:  redness, tenderness, or signs of infection (pain, swelling, redness, odor or green/yellow discharge around incision site)     Call MD for:  hives     Call MD for:  persistent dizziness or light-headedness     Call MD for:  extreme fatigue     Leave dressing on - Keep it clean, dry, and intact until clinic visit        TIME SPENT ON DISCHARGE: 5 minutes

## 2024-04-17 NOTE — OP NOTE
[facility]  History & Physical  Orthopedics     SUBJECTIVE:       Chief Complaint: Right long finger injury     Referring Provider: No ref. provider found      Dr. Johnson is the supervising physician for this encounter/patient     History of Present Illness:  Patient is a 33 y.o. right hand dominant female who presents today in follow-up from her right long finger injury.  She was recently sent for MRI over persistent pain, deformity, and concern for infection and returns for those results and re-evaluation.  No other new complaints.  Pain is still severe and she rates it 8/10 in severity today.     Onset of symptoms/DOI was 6/21/23.     Symptoms are aggravated by activity and movement.     Symptoms are alleviated by  none .     Symptoms consist of pain and deformity     The patient rates their pain as 8/10     Attempted treatment(s) and/or interventions include surgery, activity modifications, rest, antibiotic therapy and wound dressing .     The patient denies any fevers, chills, N/V, D/C and presents for evaluation.                Past Medical History:   Diagnosis Date    COVID-19                  Past Surgical History:   Procedure Laterality Date    DILATION AND CURETTAGE OF UTERUS        EYE SURGERY        FINGER TENDON REPAIR Right 6/23/2023     Procedure: REPAIR, TENDON, FINGER - Right Long finger I&D, poss tendon repair, poss skin graft vs integra;  Surgeon: Ruel Johnson MD;  Location: [facility];  Service: Orthopedics;  Laterality: Right;      Review of patient's allergies indicates:  No Known Allergies  Social History            Social History Narrative    Not on file      No family history on file.        Current Outpatient Medications:     acetaminophen (TYLENOL) 500 MG tablet, Take 1 tablet (500 mg total) by mouth every 6 (six) hours as needed for Pain., Disp: 20 tablet, Rfl: 0    amoxicillin (AMOXIL) 500 MG capsule, Take 500 mg by mouth 3 (three) times daily., Disp: , Rfl:      azithromycin (Z-GIANCARLO) 250 MG tablet, Take 2 tablets by mouth on day 1; Take 1 tablet by mouth on days 2-5, Disp: 6 tablet, Rfl: 0    gabapentin (NEURONTIN) 300 MG capsule, Take 1 capsule (300 mg total) by mouth 3 (three) times daily. for 7 days, Disp: 21 capsule, Rfl: 0    ibuprofen (ADVIL,MOTRIN) 800 MG tablet, Take 1 tablet (800 mg total) by mouth 3 (three) times daily., Disp: 90 tablet, Rfl: 2    medroxyPROGESTERone (DEPO-PROVERA) 150 mg/mL Syrg, Inject 150 mg into the muscle., Disp: , Rfl:     naloxone (NARCAN) 4 mg/actuation Spry, 4mg by nasal route as needed for opioid overdose; may repeat every 2-3 minutes in alternating nostrils until medical help arrives. Call 911, Disp: 1 each, Rfl: 11    oxyCODONE (ROXICODONE) 5 MG immediate release tablet, Take 1 tablet (5 mg total) by mouth every 6 (six) hours as needed for Pain. May take 1-2 tablets every 6 hours as needed for pain, Disp: 15 tablet, Rfl: 0    oxyCODONE-acetaminophen (PERCOCET) 5-325 mg per tablet, Take 1 tablet by mouth every 4 (four) hours as needed for Pain., Disp: 28 tablet, Rfl: 0    promethazine HCl (PHENERGAN ORAL), Take by mouth., Disp: , Rfl:     promethazine-dextromethorphan (PROMETHAZINE-DM) 6.25-15 mg/5 mL Syrp, SMARTSIG:10 Milliliter(s) By Mouth Every 8 Hours PRN, Disp: , Rfl:     traMADoL (ULTRAM) 50 mg tablet, Take 1 tablet (50 mg total) by mouth every 6 (six) hours., Disp: 20 tablet, Rfl: 0    traMADoL (ULTRAM) 50 mg tablet, Take 1 tablet (50 mg total) by mouth every 6 (six) hours., Disp: 28 tablet, Rfl: 0  No current facility-administered medications for this visit.     Facility-Administered Medications Ordered in Other Visits:     fentaNYL 50 mcg/mL injection  mcg,  mcg, Intravenous, PRN, Ramy Ramirez MD, 100 mcg at 06/23/23 1220    haloperidol lactate injection 0.5 mg, 0.5 mg, Intravenous, Q10 Min PRN, Candelaria Parker MD    methocarbamoL tablet 1,000 mg, 1,000 mg, Oral, Once, Candelaria Parker MD    midazolam (VERSED)  1 mg/mL injection 0.5-4 mg, 0.5-4 mg, Intravenous, PRN, Ramy Ramirez MD, 2 mg at 06/23/23 1220    ondansetron injection 4 mg, 4 mg, Intravenous, Daily PRN, Candelaria Parker MD    sodium chloride 0.9% flush 10 mL, 10 mL, Intravenous, PRN, Candelaria Parker MD        Review of Systems:  Constitutional: no fever or chills  Eyes: no visual changes  ENT: no nasal congestion or sore throat  Respiratory: no cough or shortness of breath  Cardiovascular: no chest pain  Gastrointestinal: no nausea or vomiting, tolerating diet  Musculoskeletal: pain, soreness, and wound     OBJECTIVE:       Vital Signs (Most Recent):  Vitals   There were no vitals filed for this visit.      There is no height or weight on file to calculate BMI.        Physical Exam:  Constitutional: The patient appears well-developed and well-nourished. No distress.   Skin: No lesions appreciated  Head: Normocephalic and atraumatic.   Nose: Nose normal.   Ears: No deformities seen  Eyes: Conjunctivae and EOM are normal.   Neck: No tracheal deviation present.   Cardiovascular: Normal rate and intact distal pulses.    Pulmonary/Chest: Effort normal. No respiratory distress.   Abdominal: There is no guarding.   Neurological: The patient is alert.   Psychiatric: The patient has a normal mood and affect.      Right Hand/Wrist Examination:     Observation/Inspection:  Swelling                       none                  Deformity                     none  Discoloration               none                  Scars                           none                  Atrophy                        none  Large surface of the dorsal DIP long finger is open/exposed, no current bleeding, exposed bone present     HAND/WRIST EXAMINATION:  Finkelstein's Test                                Neg  WHAT Test                                         Neg  Snuff box tenderness                          Neg  Cardenas's Test                                     Neg  Hook of Hamate Tenderness               Neg  CMC grind                                           Neg  Circumduction test                              Neg     Neurovascular Exam:  Digits WWP, brisk CR < 3s throughout  NVI motor/LTS to M/R/U nerves, radial pulse 2+     ROM hand: not assessed due to condition     ROM wrist full, painless    ROM elbow full, painless     Abdomen not guarded  Respirations nonlabored  Perfusion intact        Diagnostic Results:     MRI right long finger:Impression:     Postsurgical changes related to extensor tendon repair at the level of the middle finger DIP joint.  Osteomyelitis and septic arthritis at the DIP joint, as above.     X-ray right long finger-FINDINGS:  Stable postsurgical changes related to a surgical procedure performed June 23, 2023 which included and extensor tendon repair.  A short pin is again noted within the base of the distal phalanx of the right middle finger in stable position.  Stable focus of lucency and irregularity involving the dorsal aspect of the distal aspect of the middle phalanx.  Clinical correlation.  As before, on the 3 images acquired, there is mild extension of the middle phalanx with respect to the proximal phalanx and flexion of the distal phalanx with respect to the middle phalanx.  Clinical correlation.           ASSESSMENT/PLAN:       33 y.o. yo female with Right long finger osteomyelitis status post prior crush injury with exposed bone and surgical reconstruction     Plan: The patient and I had a thorough discussion today.  We discussed the working diagnosis as well as several other potential alternative diagnoses.  Treatment options were discussed, both conservative and surgical.  Conservative treatment options would include things such as activity modifications, anti-inflammatory medications, occupational therapy, splinting/bracing, corticosteroid injections, and others.  Surgical options were discussed as well.     At this point in time, he onto certainly does have  osteomyelitis in addition to persistent deformity and pain of the right long finger.  We discussed further attempts at salvage including deformity correction, long-term IV antibiotics, and attempted preservation of the digit with incision and drainage and bone biopsy/cultures.  We discussed this option versus amputation of the right long finger.  At this point in time she would like to proceed with a partial amputation of the right long finger on January 19, 2024 as I would suspect this will allow her a more quick recovery then attempted salvage.  As such I will get this scheduled.     The patient has not responded to adequate non operative treatment at this time and/or non operative treatment is not indicated. Thus, the risks, benefits and alternatives to surgery were discussed with the patient in detail.  Specific risks include but are not limited to bleeding, infection, vessel and/or nerve damage, pain, numbness, tingling, compartment syndrome, need for additional surgery, failure to return to pre-injury and/or preoperative functional status, inability to return to work, scar sensitivity, delayed healing, complex regional pain syndrome, weakness, pulley injury, tendon injury, bowstringing, partial and/or incomplete relief of symptoms, persistence of and/or worsening of symptoms, hardware and/or surgical failure, prominent and/or symptomatic hardware possibly necessitating future removal, osteomyelitis, amputation, loss of function, stiffness, rotational malalignment, functional debility, dysfunction, decreased  strength, need for prolonged postoperative rehabilitation, malunion, nonunion, deep venous thrombosis, pulmonary embolism, arthritis and death.  The patient states an understanding and wishes to proceed with surgery.   All questions were answered.  No guarantees were implied or stated.  Written informed consent was obtained.        Please do not hesitate to reach out to us via email, phone, or [facility]  with any questions, concerns, or feedback.

## 2024-04-17 NOTE — PLAN OF CARE
Dc instructions reviewed with pt and step-father. Verbalized understanding, questions answered, reassurance provided.     Hand outs given.

## 2024-04-17 NOTE — PLAN OF CARE
Nursing pre-op complete. Pt calm, will continue to monitor. Call light within reach. Pt's belongings placed in locker #7, 1 bag. Pt's step father will return for post-op and ride home.

## 2024-04-18 NOTE — ANESTHESIA POSTPROCEDURE EVALUATION
Anesthesia Post Evaluation    Patient: Javier Anderson    Procedure(s) Performed: Procedure(s) (LRB):  PARTIAL AMPUTATION, FINGER - right long finger (Right)    Final Anesthesia Type: general      Patient location during evaluation: PACU  Patient participation: Yes- Able to Participate  Level of consciousness: awake and alert and oriented  Pain management: adequate  Airway patency: patent    PONV status at discharge: No PONV  Anesthetic complications: no      Cardiovascular status: blood pressure returned to baseline and hemodynamically stable  Respiratory status: unassisted  Hydration status: euvolemic  Follow-up not needed.              Vitals Value Taken Time   /72 04/17/24 1230   Temp 37 °C (98.6 °F) 04/17/24 1230   Pulse 56 04/17/24 1230   Resp 56 04/17/24 1230   SpO2 100 % 04/17/24 1230         No case tracking events are documented in the log.      Pain/Lucía Score: Pain Rating Prior to Med Admin: 0 (4/17/2024  9:35 AM)  Pain Rating Post Med Admin: 0 (4/17/2024 10:15 AM)

## 2024-04-20 LAB — BACTERIA SPEC AEROBE CULT: NO GROWTH

## 2024-04-22 NOTE — OP NOTE
DATE OF PROCEDURE: 04/17/2024     COVID-19 attestation:  Due to the nature of this patient's condition and/or the presence of pain, debilitty, and/or dysfunction, proceeding with surgery was indicated.  Furthermore, this patient was treated during the COVID-19 pandemic.  This was discussed with the patient, they are aware of our current policies and procedures, were given the option of delaying their surgery when applicable and if acceptable, and they elect to proceed.  Delaying this patient's surgery greater than 30 days would be detrimental to their care and functional outcome and/or cause additional suffering, pain, discomfort, and/or dysfunction/debility.  This was confirmed and we thus proceeded.       SERVICE:  Orthopedic Surgery.     SURGEON:  Ruel Johnson M.D.     FIRST ASSISTANT:  SMA Maude     PREOPERATIVE DIAGNOSIS:    Osteomyelitis right long finger  Prior near amputation right long finger status post reconstruction  Persistent pain, deformity, and stiffness right long finger     POSTOPERATIVE DIAGNOSIS:    Same     PROCEDURE(S) PERFORMED:    Right long finger amputation through the middle phalanx     TOURNIQUET TIME:  22 minutes at 250mmHg.     ESTIMATED BLOOD LOSS:  3 mL.     IMPLANTS:  None     COMPLICATIONS:  None.     PACKS AND DRAINS:  None.     PATHOLOGIC SPECIMENS:  The amputated digit was sent.     ANESTHESIA:  regional     IV FLUIDS: Crystalloid.     CONDITION:  Stable.     MICROBIOLOGY:  Cultures were sent of the residual digit.     Indications for Procedure:      The patient is a 33-year-old female who previously sustained significant trauma to the right long finger and had a near amputation.  She underwent reconstruction initially and developed persistent pain debility and dysfunction.  She was found to have osteomyelitis.  She wished to proceed with amputation.  The patient has not responded to adequate non operative treatment at this time and/or non operative treatment is not indicated.  Thus, the risks, benefits and alternatives to surgery were discussed with the patient in detail.  Specific risks include but are not limited to bleeding, infection, vessel and/or nerve damage, pain, numbness, tingling, compartment syndrome, need for additional surgery, failure to return to pre-injury and/or preoperative functional status, inability to return to work, scar sensitivity, delayed healing, complex regional pain syndrome, weakness, pulley injury, tendon injury, bowstringing, partial and/or incomplete relief of symptoms, persistence of and/or worsening of symptoms, hardware and/or surgical failure, prominent and/or symptomatic hardware possibly necessitating future removal, osteomyelitis, amputation, loss of function, stiffness, rotational malalignment, functional debility, dysfunction, decreased  strength, need for prolonged postoperative rehabilitation, malunion, nonunion, deep venous thrombosis, pulmonary embolism, arthritis and death.  The patient states an understanding and wishes to proceed with surgery.   All questions were answered.  No guarantees were implied or stated.  Written informed consent was obtained.     Procedure in detail:     On the date of the operative intervention, the patient was evaluated in the preoperative holding area.  With their participation the right upper extremity was marked at the operative site.   The patient was then administered regional anesthesia and taken to the operating room where they were placed on OR table.  The right upper extremity extremity was then placed on a hand table with a nonsterile tourniquet placed high on the patient's right upper extremity.  The right upper extremity extremity was then prepped and draped in the usual sterile fashion and time-out was taken and confirmed by all present to confirm the correct patient site procedure and administration of preoperative antibiotics if ordered.  All were in agreement so I proceeded.     Esmarch was  utilized to exsanguinate the right upper extremity and tourniquet was insufflated to 250 mmHg.  Fishmouth flaps were marked out at a level of tissue viability overlying the right long finger middle phalanx.  These were then sharply made with a scalpel dissection was carried down through skin and subcutaneous tissue.  Flexor and extensor tendons were resected and allowed to retract proximally.  The radial and ulnar neurovascular bundles were identified.  Digital vessels were cauterized and digital nerves were retracted sharply resected and allowed to retract proximally.  Bone cutter was utilized true transect the finger through the middle phalanx and complete the amputation.  At this point in time the right long finger amputation was completed.  The specimen was passed off the field.  The wound was irrigated with copious amounts of sterile saline.  Cultures were taken of the residual digit.  Skin was closed with 3-0 nylon suture and there was excellent soft tissue coverage with healthy tissue at the amputation and flap margins.  Sterile soft dressings were applied.  Tourniquet was deflated and brisk capillary refill in [place].  The patient was awakened from anesthesia and returned to the post anesthesia care unit.  There were no complications.       Postoperative plan for the patient:  The patient will be discharged home.  Follow-up in 2 weeks for possible suture removal, wound check, and re-evaluation of the postoperative plan.     Please be aware that this note has been generated with the assistance of nichole voice-to-text.  Please excuse any spelling or grammatical errors.

## 2024-04-24 LAB
BACTERIA SPEC ANAEROBE CULT: NORMAL
FINAL PATHOLOGIC DIAGNOSIS: NORMAL
GROSS: NORMAL
Lab: NORMAL
MICROSCOPIC EXAM: NORMAL

## 2024-04-26 RX ORDER — OXYCODONE AND ACETAMINOPHEN 5; 325 MG/1; MG/1
1 TABLET ORAL EVERY 4 HOURS PRN
Qty: 10 TABLET | Refills: 0 | Status: SHIPPED | OUTPATIENT
Start: 2024-04-26 | End: 2024-04-30 | Stop reason: SDUPTHER

## 2024-04-26 NOTE — TELEPHONE ENCOUNTER
----- Message from Radha An sent at 4/26/2024 11:20 AM CDT -----  Regarding: Refill/Appt  Contact: pt [phone]  Rx Refill/Request    Is this a Refill or New Rx:  refill    Rx Name and Strength:      - oxyCODONE-acetaminophen (PERCOCET) 5-325 mg per tablet 10 tablet    Preferred Pharmacy with phone number:    [id number] - [place]  [address] [facility] [address] [place] [address]  [place] [address]  Phone: [phone] Fax: [phone]     Communication Preference: please call pt [phone] if needed    Additional Information: pt is requesting refill, also would like to know when next post op visit is

## 2024-04-30 ENCOUNTER — OFFICE VISIT (OUTPATIENT)
Dept: ORTHOPEDICS | Facility: CLINIC | Age: 34
End: 2024-04-30
Payer: MEDICAID

## 2024-04-30 ENCOUNTER — CLINICAL SUPPORT (OUTPATIENT)
Dept: REHABILITATION | Facility: HOSPITAL | Age: 34
End: 2024-04-30
Payer: MEDICAID

## 2024-04-30 DIAGNOSIS — M86.149 OTHER ACUTE OSTEOMYELITIS, UNSPECIFIED HAND: ICD-10-CM

## 2024-04-30 DIAGNOSIS — Z98.890 POSTOPERATIVE STATE: ICD-10-CM

## 2024-04-30 DIAGNOSIS — S68.522A PARTIAL TRAUMATIC AMPUTATION OF LEFT THUMB THROUGH PHALANX, INITIAL ENCOUNTER: Primary | ICD-10-CM

## 2024-04-30 DIAGNOSIS — S68.522A PARTIAL TRAUMATIC AMPUTATION OF LEFT THUMB THROUGH PHALANX, INITIAL ENCOUNTER: ICD-10-CM

## 2024-04-30 PROCEDURE — 99999 PR PBB SHADOW E&M-EST. PATIENT-LVL II: CPT | Mod: PBBFAC,,, | Performed by: PHYSICIAN ASSISTANT

## 2024-04-30 PROCEDURE — 99212 OFFICE O/P EST SF 10 MIN: CPT | Mod: PBBFAC | Performed by: PHYSICIAN ASSISTANT

## 2024-04-30 PROCEDURE — 1159F MED LIST DOCD IN RCRD: CPT | Mod: CPTII,,, | Performed by: PHYSICIAN ASSISTANT

## 2024-04-30 PROCEDURE — 99024 POSTOP FOLLOW-UP VISIT: CPT | Mod: ,,, | Performed by: PHYSICIAN ASSISTANT

## 2024-04-30 PROCEDURE — 97165 OT EVAL LOW COMPLEX 30 MIN: CPT

## 2024-04-30 RX ORDER — OXYCODONE AND ACETAMINOPHEN 5; 325 MG/1; MG/1
1 TABLET ORAL EVERY 6 HOURS PRN
Qty: 15 TABLET | Refills: 0 | Status: SHIPPED | OUTPATIENT
Start: 2024-04-30

## 2024-04-30 NOTE — PLAN OF CARE
[facility]  Occupational Therapy Initial Evaluation     Name: Javier Segundolveen  Clinic Number: [id number]    Therapy Diagnosis:   Encounter Diagnoses   Name Primary?    Partial traumatic amputation of left thumb through phalanx, initial encounter     Other acute osteomyelitis, unspecified hand     Postoperative state      Physician: Russo-Digeorge, Jamie L*    Physician Orders: Eval and Treat  Medical Diagnosis:   S68.522A (ICD-10-CM) - Partial traumatic amputation of left thumb through phalanx, initial encounter   M86.149 (ICD-10-CM) - Other acute osteomyelitis, unspecified hand   Z98.890 (ICD-10-CM) - Postoperative state     Surgical Procedure and Date:     PROCEDURE(S) PERFORMED:    Right long finger amputation through the middle phalanx , 4/17/2024  Evaluation Date: 4/30/2024  Insurance Authorization Period Expiration: 4/30/2024 - 4/30/2025  Plan of Care Certification Period: 07/30/2024  Date of Return to MD: 2 weeks from 4/30/2024, per MD note  Visit # / Visits authorized: 1 / 1  FOTO: -/ 3    Precautions:  Standard and weightbearing    Time In: 2: 00 PM  Time Out: 2: 30 PM  Total Billable Time: 30 minutes    Comments: The patietn came from a physicians appointment then had to  her child from school for 2:45pm in another part of [facility].     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     Functional Limitations/Social History:    Previous functional  status includes: Independent with all ADLs.     Current Functional Status   Home/Living environment: lives with their family       Limitation of Functional Status as follows:   ADLs/IADLs:     - Feeding: independent    - Bathing: independent    - Dressing: independent but with increased difficulty  - Grooming: independent    - Home Management: independent    - Driving: independent     Leisure: Motherly duties    Patient's Goals for Therapy: Improved daily function with decreased pain.     Past Medical History/Physical Systems Review:   Javier Anderson  has a past medical history of COVID-19.    Javier Anderson  has a past surgical history that includes Eye surgery; Dilation and curettage of uterus; Finger tendon repair (Right, 6/23/2023); Finger amputation (Right, 2/23/2024); and Finger amputation (Right, 4/17/2024).    Javier has a current medication list which includes the following prescription(s): acetaminophen, amoxicillin, azithromycin, benzocaine-menthol, gabapentin, ibuprofen, ibuprofen, medroxyprogesterone, naloxone, oxycodone-acetaminophen, promethazine hcl, and promethazine-dextromethorphan, and the following Facility-Administered Medications: fentanyl, haloperidol lactate, methocarbamol, midazolam, ondansetron, and sodium chloride 0.9%.    Review of patient's allergies indicates:  No Known Allergies     Objective     Observation/Appearance:  Skin shiny/tight and Joint stiffness. Sutures have been removed.     Edema. Measured in centimeters.    To be assessed at initial treatment session due to time constraint with the patient having to take care of her family.     Hand ROM. Measured in degrees.   4/30/2024 4/30/2024    Left Right    WNL  (Full fist)    Index: MP   0/80              PIP      0/90              DIP  0/14              CRUZ  184        Long:  MP  -/75              PIP  -/-              DIP  -/-              CRUZ  75        Ring:   MP  -/82              PIP  -/90              DIP  -/42               CRUZ  214        Small:  MP  -/82               PIP  -/85               DIP  -/43              CRUZ  210          Elbow and Wrist ROM. Measured in degrees.   4/30/2024 4/30/2024    Left Right   Supination/Pronation WNL WNL   Wrist Ext/Flex 82/75 50/65   Wrist RD/UD 15/20 20/15     Sensation: Pt has increased sensitivity in the left long finger. Formal testing to be conducted in initial treatment visits if sensation decreases and becomes dysfunctional.      Strength (Dyanmometer) and Pinch Strength (Pinch Gauge)  Measured in pounds and psi. Average of three trials.   4/30/2024 4/30/2024    Right   Deferred Left   Deferred   Rung II     Landry Pinch     3pt Pinch     2pt Pinch         Intake Outcome Measure for FOTO finger Survey    Therapist reviewed FOTO scores for Javier Anderson on 4/30/2024.   FOTO report - see Media section or FOTO account episode details.    Intake Score: -%         Treatment     Javier received the treatments listed below:       - The patient was put in a light dressing with silicone padding, gauze and 1 inch coban wrap.   - The patient received education on wear time, keeping the wound clean and wrapping techniques.     Patient Education and Home Exercises      Education provided:   -role of OT, goals for OT, scheduling/cancellations, insurance limitations with patient.  -Additional Education provided:     Written Home Exercises Provided: yes.  Exercises were reviewed and Javier was able to demonstrate them prior to the end of the session.    Javier demonstrated fair  understanding of the education provided.     Pt was advised to perform these exercises free of pain, and to stop performing them if pain occurs.    See EMR under Patient Instructions for exercises provided 4/30/2024.    Assessment     Javier Anderson is a 33 y.o. female referred to outpatient occupational therapy and presents with a medical diagnosis of   S68.522A (ICD-10-CM) - Partial traumatic amputation of left thumb  through phalanx, initial encounter   M86.149 (ICD-10-CM) - Other acute osteomyelitis, unspecified hand   Z98.890 (ICD-10-CM) - Postoperative state   .    Following medical record review it is determined that pt will benefit from occupational therapy services in order to maximize pain free and/or functional use of right long finger and surrounding digits. The following goals were discussed with the patient and patient is in agreement with them as to be addressed in the treatment plan. The patient's rehab potential is Good.     Anticipated barriers to occupational therapy: recurring injury    Plan of care discussed with patient: Yes  Patient's spiritual, cultural and educational needs considered and patient is agreeable to the plan of care and goals as stated below:     Medical Necessity is demonstrated by the following  Occupational Profile/History  Co-morbidities and personal factors that may impact the plan of care [x] LOW: Brief chart review  [] MODERATE: Expanded chart review   [] HIGH: Extensive chart review    Moderate / High Support Documentation:      Examination  Performance deficits relating to physical, cognitive or psychosocial skills that result in activity limitations and/or participation restrictions  [x] LOW: addressing 1-3 Performance deficits  [] MODERATE: 3-5 Performance deficits  [] HIGH: 5+ Performance deficits (please support below)    Moderate / High Support Documentation:    Physical:  Joint Mobility  Edema  Pinch Strength  Pain    Cognitive:  No Deficits    Psychosocial:    Habits  Routines  Rituals     Treatment Options [x] LOW: Limited options  [] MODERATE: Several options  [] HIGH: Multiple options      Decision Making/ Complexity Score: low       The following goals were discussed with the patient and patient is in agreement with them as to be addressed in the treatment plan.       Goals:    LTG's (6 weeks):  1)    Increase right index finger CRUZ by at worst 20 degrees to increase  functional hand use for ADLs/work/leisure activities. progressing not met 4/30/2024  2)   Decrease complaints of pain to  1 out of 10 at worst to increase functional hand use for ADL/work/leisure activities. progressing not met 4/30/2024  3)   Patient to score at -% or more on FOTO to demonstrate improved perception of functional hand use. progressing not met 4/30/2024   4)   Pt will return to near to prior level of function for ADLs and household management reporting I or Mod I with ADLs (dressing, feeding, grooming, toileting). progressing not met 4/30/2024  5)  Assess  strength at appropriate time to increase functional hand use for ADLs/work/leisure activities. Progressing not met 4/30/2024  6) Increase right ring and pinky DIP flexion by at least 20 degrees to increase functional hand use for ADLs/work/leisure activities. progressing not met 4/30/2024  7) Pt to perform desensitization activities for at least 5 consecutive minutes with no required breaks for improved use of the right hand during daily activity. progressing not met 4/30/2024      STG's (3 weeks)  1)   Patient to be IND with HEP and modalities for pain/edema managment. progressing not met 4/30/2024  2)   Increase right index finger CRUZ by at worst 15 degrees to increase functional hand use for ADLs/work/leisure activities. progressing not met 4/30/2024  3)   Increase right ring and pinky DIP flexion by at least  10 degrees to increase functional hand use for ADLs/work/leisure activities. progressing not met 4/30/2024  4). Pt to perform desensitization activities for at least 3 consecutive minutes with no required breaks for improved use of the right hand during daily activity. progressing not met 4/30/2024  6)   Patient to score at -% or more on FOTO to demonstrate improved perception of functional hand use. progressing not met 4/30/2024  7)   Decrease complaints of pain to  1 out of 10 at worst to increase functional hand use for  ADL/work/leisure activities. progressing not met 4/30/2024  8)   Assess pinch strength at appropriate time to increase functional hand use for activities of daily living/work/leisure activities. Progressing not met 4/30/2024       Plan   Certification Period/Plan of care expiration: 4/30/2024 to 7/30/2024.    Outpatient Occupational Therapy 2 times weekly for 4 weeks to include the following interventions: Paraffin, Fluidotherapy, Manual therapy/joint mobilizations, Modalities for pain management, US 3 mhz, Therapeutic exercises/activities., Strengthening, Orthotic Fabrication/Fit/Training, Scar Management, and Wound Care.    Robert Pham, OT

## 2024-04-30 NOTE — PROGRESS NOTES
Dr. Johnson is the supervising physician for this encounter/patient    Javier Anderson presents for post-operative evaluation.  The patient is now 2 weeks s/p below procedures with Dr. Johnson on 4/17/24.    PROCEDURE(S) PERFORMED:    Right long finger amputation through the middle phalanx    Overall the patient reports doing well.  She reports 10/10 pain, denies any f/c/s. She is taking Ibuprofen, ran out of Percocet a few days ago and has not picked up the refill.    No growth on cultures thus far.    PE:    AA&O x 4.  NAD  HEENT:  NCAT, sclera nonicteric  Lungs:  Respirations are equal and unlabored.  CV:  2+ bilateral upper and lower extremity pulses.  MSK: The wound is healing well with no signs of erythema or warmth.  There is no drainage.  No clinical signs or symptoms of infection are present.  Mild edema present. SILT. DNVI.    A/P: Status post above, doing well  1) Start OT for postop rehab.  2) All sutures removed today. Wound care and signs of infection discussed.  3) F/U 2 weeks for wound check.  4) Call with any questions/concerns in the interim      Jamie Russo-DiGeorge PA-C

## 2024-04-30 NOTE — PATIENT INSTRUCTIONS
PIP Flexion (Active Blocked)        Hold large knuckle straight using other hand. Bend middle joint of the finger as far as possible.   Repeat 20 times. Do 5 sessions per day.  Activity: Curl fingers around a jar cap.*     DIP Flexion (Active Blocked)        Hold the finger firmly at the middle so that only the tip joint can bend.   Repeat 20 times. Do 5 sessions per day.       Flexor Tendon Gliding (Active Full Fist)        Straighten all fingers, then make a fist, bending all joints.  Repeat 20 times. Do 5 sessions per day.        MP Extension (Active)        With palm on table, straighten fingers completely at large knuckles, and lift fingers individually off table.   Repeat 20 times. Do 5 sessions per day.    Abduction / Adduction (Active)        With hand flat on table, spread all fingers apart, then bring them together as close as possible.  Repeat 20 times. Do 5 sessions per day.     Extension (Active With Finger Extension)        With forearm on table and wrist over edge, lift hand with fingers straight.   Repeat 20 times. Do 5 sessions per day.    Radial / Ulnar Deviation (Assistive)        Move hand side to side like a windshield wiper. Do not move elbow.  Repeat 20 times. Do 5 sessions per day.    Circumduction (Active)        With fingers curled, move slowly at wrist in clock- wise circles 20 times. Repeat counterclockwise. Do not move elbow or shoulder.  Do 5 sessions per day.

## 2024-05-08 RX ORDER — OXYCODONE AND ACETAMINOPHEN 5; 325 MG/1; MG/1
1 TABLET ORAL EVERY 8 HOURS PRN
Qty: 10 TABLET | Refills: 0 | Status: SHIPPED | OUTPATIENT
Start: 2024-05-08

## 2024-05-15 ENCOUNTER — CLINICAL SUPPORT (OUTPATIENT)
Dept: REHABILITATION | Facility: HOSPITAL | Age: 34
End: 2024-05-15
Payer: OTHER GOVERNMENT

## 2024-05-15 DIAGNOSIS — M79.644 PAIN OF FINGER OF RIGHT HAND: Primary | ICD-10-CM

## 2024-05-15 PROCEDURE — 97530 THERAPEUTIC ACTIVITIES: CPT

## 2024-05-15 PROCEDURE — 97110 THERAPEUTIC EXERCISES: CPT

## 2024-05-15 NOTE — PROGRESS NOTES
"[facility] OUTPATIENT THERAPY AND WELLNESS  Occupational Therapy Treatment Note     Date: 5/15/2024  Name: Javier Anderson  Clinic Number: [id number]    Therapy Diagnosis:   Encounter Diagnosis   Name Primary?    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, initial encounter   M86.149 (ICD-10-CM) - Other acute osteomyelitis, unspecified hand   Z98.890 (ICD-10-CM) - Postoperative state      Surgical Procedure and Date:     PROCEDURE(S) PERFORMED:    Right long finger amputation through the middle phalanx , 4/17/2024  Evaluation Date: 4/30/2024  Insurance Authorization Period Expiration: 4/30/2024 - 4/30/2025  Plan of Care Certification Period: 07/30/2024  Date of Return to MD: 2 weeks from 4/30/2024, per MD note  Visit # / Visits authorized: 2 / 12  FOTO: -/ 3     Lobby Access Code: [email or web address]     Precautions:  Standard and weightbearing     Time In: 10: 00 AM  Time Out: 10:50: AM  Total Billable Time: 50 minutes    Subjective     Patient reports: She was on a cruise last week that was previously scheduled. She has pain in the finger with decreased sensation for tactile use.   She was compliant with home exercise program given last session.   Response to previous treatment:elmer well  Functional change: none at this time    Pain: 10/10  Location: right fingers      Objective     Objective Measures updated at progress report unless specified.    Treatment     Javier received the treatments listed below:      therapeutic exercises to develop strength and ROM for 30 minutes including:  - Digit abduction/adduction x 20  - MP extension lifts x 10  - TGE's (wave, hook, small fist) x 10 each using bilateral coordination   - composite fist x 10  - joint blocking (MP and PIP) x 20 each  - passive range of motion: LF flexion at MP and PIP x 10 with 5" hold and gentle " soft tissue mobilization over the dorsal aspect of the LF for increased extensor stretch    manual therapy techniques: Manual Lymphatic Drainage and Soft tissue Mobilization were applied to the: right LF for 0 minutes, including:    neuromuscular re-education activities to improve: Coordination and Proprioception for 0 minutes. The following activities were included:    therapeutic activities to improve functional performance for 10 minutes, including:  - Desensitizatizing: towel taps and desensitization stick with coban wrap, ktape and foam pad.   - Shaping: light 1-inch gauze wrap followed by coban wrap.   - Education for shaping and desensitization    hot pack for 10 minutes to he right hand.    Patient Education and Home Exercises     Education provided:   - Purpose of desensitization and shaping  - wear time for shaping wrap  - Progress towards goals     Written Home Exercises Provided: Patient instructed to cont prior HEP.  Exercises were reviewed and Javier was able to demonstrate them prior to the end of the session.  Javier demonstrated good  understanding of the home exercise program provided. See electronic medical record under Patient Instructions for exercises provided during therapy sessions.       Assessment     Pt tolerated treatment well. She has continued numbness in the volar tip of her right LF with increased sensitivity on the dorsal aspect near the same area. Desensitization stick and shaping dressing given to the patient this date. FOTO to be assessed next session.     Javier is progressing well towards her goals and there are no updates to goals at this time. Pt prognosis is Good.     Patient will continue to benefit from skilled outpatient occupational therapy to address the deficits listed in the problem list on initial evaluation provide patient/family education and to maximize patient's level of independence in the home and community environment.     Patient's spiritual, cultural and  educational needs considered and patient agreeable to plan of care and goals.    Anticipated barriers to occupational therapy: recurring injury    Goals:   LTG's (6 weeks):  1)    Increase right index finger CRUZ by at worst 20 degrees to increase functional hand use for ADLs/work/leisure activities. progressing not met 4/30/2024  2)   Decrease complaints of pain to  1 out of 10 at worst to increase functional hand use for ADL/work/leisure activities. progressing not met 4/30/2024  3)   Patient to score at -% or more on FOTO to demonstrate improved perception of functional hand use. progressing not met 4/30/2024   4)   Pt will return to near to prior level of function for ADLs and household management reporting I or Mod I with ADLs (dressing, feeding, grooming, toileting). progressing not met 4/30/2024  5)  Assess  strength at appropriate time to increase functional hand use for ADLs/work/leisure activities. Progressing not met 4/30/2024  6) Increase right ring and pinky DIP flexion by at least 20 degrees to increase functional hand use for ADLs/work/leisure activities. progressing not met 4/30/2024  7) Pt to perform desensitization activities for at least 5 consecutive minutes with no required breaks for improved use of the right hand during daily activity. progressing not met 4/30/2024     STG's (3 weeks)  1)   Patient to be IND with HEP and modalities for pain/edema managment. progressing not met 4/30/2024  2)   Increase right index finger CRUZ by at worst 15 degrees to increase functional hand use for ADLs/work/leisure activities. progressing not met 4/30/2024  3)   Increase right ring and pinky DIP flexion by at least  10 degrees to increase functional hand use for ADLs/work/leisure activities. progressing not met 4/30/2024  4). Pt to perform desensitization activities for at least 3 consecutive minutes with no required breaks for improved use of the right hand during daily activity. progressing not met  4/30/2024  6)   Patient to score at -% or more on FOTO to demonstrate improved perception of functional hand use. progressing not met 4/30/2024  7)   Decrease complaints of pain to  1 out of 10 at worst to increase functional hand use for ADL/work/leisure activities. progressing not met 4/30/2024  8)   Assess pinch strength at appropriate time to increase functional hand use for activities of daily living/work/leisure activities. Progressing not met 4/30/2024     Plan     Updates/Grading for next session: Continue OT POC. FOTO to be assessed next session.     Robert Pham, OT   5/15/2024

## 2024-05-20 LAB — FUNGUS SPEC CULT: NORMAL

## 2024-05-21 ENCOUNTER — OFFICE VISIT (OUTPATIENT)
Dept: ORTHOPEDICS | Facility: CLINIC | Age: 34
End: 2024-05-21
Payer: MEDICAID

## 2024-05-21 DIAGNOSIS — Z98.890 POSTOPERATIVE STATE: ICD-10-CM

## 2024-05-21 DIAGNOSIS — M86.149 OTHER ACUTE OSTEOMYELITIS, UNSPECIFIED HAND: Primary | ICD-10-CM

## 2024-05-21 DIAGNOSIS — F32.89 OTHER DEPRESSION: ICD-10-CM

## 2024-05-21 PROCEDURE — 99213 OFFICE O/P EST LOW 20 MIN: CPT | Mod: PBBFAC | Performed by: PHYSICIAN ASSISTANT

## 2024-05-21 PROCEDURE — 99999 PR PBB SHADOW E&M-EST. PATIENT-LVL III: CPT | Mod: PBBFAC,,, | Performed by: PHYSICIAN ASSISTANT

## 2024-05-21 PROCEDURE — 1159F MED LIST DOCD IN RCRD: CPT | Mod: CPTII,,, | Performed by: PHYSICIAN ASSISTANT

## 2024-05-21 PROCEDURE — 99024 POSTOP FOLLOW-UP VISIT: CPT | Mod: ,,, | Performed by: PHYSICIAN ASSISTANT

## 2024-05-21 RX ORDER — IBUPROFEN 600 MG/1
600 TABLET ORAL 3 TIMES DAILY
Qty: 30 TABLET | Refills: 0 | Status: SHIPPED | OUTPATIENT
Start: 2024-05-21

## 2024-05-21 RX ORDER — HYDROCODONE BITARTRATE AND ACETAMINOPHEN 5; 325 MG/1; MG/1
1 TABLET ORAL EVERY 8 HOURS PRN
Qty: 15 TABLET | Refills: 0 | Status: SHIPPED | OUTPATIENT
Start: 2024-05-21

## 2024-05-21 NOTE — PROGRESS NOTES
Dr. Johnson is the supervising physician for this encounter/patient    Javier Anderson presents for post-operative evaluation.  The patient is now 5 weeks s/p below procedures with Dr. Johnson on 4/17/24.    PROCEDURE(S) PERFORMED:    Right long finger amputation through the middle phalanx    Overall the patient reports doing well.  She reports 3/10 pain at baseline, 10/10 if she hits the finger on something, denies any f/c/s. She has been taking Ibuprofen and Percocet. She is very tearful, and does admit to feeling depressed from this surgery. She has seen OT twice since surgery.    No growth on cultures thus far.    PE:    AA&O x 4.  NAD  HEENT:  NCAT, sclera nonicteric  Lungs:  Respirations are equal and unlabored.  CV:  2+ bilateral upper and lower extremity pulses.  MSK: The wound is healing well with no signs of erythema or warmth.  There is no drainage.  No clinical signs or symptoms of infection are present.  Full flexion of the long MCP, minimal flexion of the PIP. SILT. DNVI.    A/P: Status post above, doing well. Postop depression.  1) Continue with OT for postop rehab. Behavioral Health referral ordered today as well.  2) Scar massage discussed to help desensitize the tip.  3) F/U 4-6 weeks for motion check.  4) Call with any questions/concerns in the interim      Jamie Russo-DiGeorge PA-C

## 2024-05-30 ENCOUNTER — DOCUMENTATION ONLY (OUTPATIENT)
Dept: REHABILITATION | Facility: HOSPITAL | Age: 34
End: 2024-05-30
Payer: MEDICAID

## 2024-05-30 NOTE — PROGRESS NOTES
Pt. Being removed from OT schedule due to attendance policy. She has missed 3 consecutive appointments since 5/15 and has only attended 2 visits since her surgery 5 weeks ago.     Kathryn Jackson, OTD, OTR/L, CHT   Occupational therapist, Certified Hand Therapist

## 2024-06-05 LAB
ACID FAST MOD KINY STN SPEC: NORMAL
MYCOBACTERIUM SPEC QL CULT: NORMAL

## 2024-06-25 ENCOUNTER — OFFICE VISIT (OUTPATIENT)
Dept: ORTHOPEDICS | Facility: CLINIC | Age: 34
End: 2024-06-25
Payer: MEDICAID

## 2024-06-25 VITALS — WEIGHT: 184.06 LBS | HEIGHT: 68 IN | BODY MASS INDEX: 27.9 KG/M2

## 2024-06-25 DIAGNOSIS — F43.10 PTSD (POST-TRAUMATIC STRESS DISORDER): Primary | ICD-10-CM

## 2024-06-25 PROCEDURE — 99999 PR PBB SHADOW E&M-EST. PATIENT-LVL III: CPT | Mod: PBBFAC,,, | Performed by: ORTHOPAEDIC SURGERY

## 2024-06-25 PROCEDURE — 1159F MED LIST DOCD IN RCRD: CPT | Mod: CPTII,,, | Performed by: ORTHOPAEDIC SURGERY

## 2024-06-25 PROCEDURE — 99213 OFFICE O/P EST LOW 20 MIN: CPT | Mod: PBBFAC | Performed by: ORTHOPAEDIC SURGERY

## 2024-06-25 PROCEDURE — 99024 POSTOP FOLLOW-UP VISIT: CPT | Mod: ,,, | Performed by: ORTHOPAEDIC SURGERY

## 2024-06-25 NOTE — PROGRESS NOTES
Javier Anderson presents for post-operative evaluation.  The patient is now 2 months s/p below procedures with Dr. Johnson on 4/17/24.    PROCEDURE(S) PERFORMED:    Right long finger amputation through the middle phalanx    Overall the patient reports doing well.  She reports   Some mild persistent pain, denies any f/c/s. She has been taking Ibuprofen. She states she is very depressed from having a missing finger and feels her strength has been affected.     PE:    AA&O x 4.  NAD  HEENT:  NCAT, sclera nonicteric  Lungs:  Respirations are equal and unlabored.  CV:  2+ bilateral upper and lower extremity pulses.  MSK: The wound is healing well with no signs of erythema or warmth.  There is no drainage.  No clinical signs or symptoms of infection are present.  Full flexion of the long MCP,  moderate intact active flexion of the PIP. SILT. DNVI.    A/P: Status post above, doing well from a surgical perspective but having some  difficulty coping with her finger injury and debility status post recent amputation and having somepostop depression.  1) Will reorder OT for postop rehab. Behavioral Health referrals ordered today as well. At this point in time, Javier is his having some difficulty coping with her recent amputation and I do not believe she is ready to return to work.  I will keep her out of work for now and reassess this in 3 months.  2) Scar massage discussed to help desensitize the tip.  3) F/U 3months for motion check.  4) Call with any questions/concerns in the interim        Ruel Johnson M.D.    Please be aware that this note has been generated with the assistance of [employer] voice-to-text.  Please excuse any spelling or grammatical errors.    Thank you for choosing Dr. Ruel Johnson for your orthopedic hand and upper extremity care. It is our goal to provide you with exceptional care that will help keep you healthy, active, and get you back in the game.     If you felt that you received exemplary care today,  please consider leaving feedback for Dr. Johnson on [employer] at [email or web address].    Please do not hesitate to reach out to us via email, phone, or [employer] with any questions, concerns, or feedback.

## 2024-09-24 ENCOUNTER — OFFICE VISIT (OUTPATIENT)
Dept: ORTHOPEDICS | Facility: CLINIC | Age: 34
End: 2024-09-24
Payer: MEDICAID

## 2024-09-24 VITALS — BODY MASS INDEX: 27.9 KG/M2 | HEIGHT: 68 IN | WEIGHT: 184.06 LBS

## 2024-09-24 DIAGNOSIS — M25.531 RIGHT WRIST PAIN: Primary | ICD-10-CM

## 2024-09-24 DIAGNOSIS — F43.10 PTSD (POST-TRAUMATIC STRESS DISORDER): ICD-10-CM

## 2024-09-24 PROCEDURE — 3008F BODY MASS INDEX DOCD: CPT | Mod: CPTII,,, | Performed by: ORTHOPAEDIC SURGERY

## 2024-09-24 PROCEDURE — 99213 OFFICE O/P EST LOW 20 MIN: CPT | Mod: PBBFAC | Performed by: ORTHOPAEDIC SURGERY

## 2024-09-24 PROCEDURE — 99999 PR PBB SHADOW E&M-EST. PATIENT-LVL III: CPT | Mod: PBBFAC,,, | Performed by: ORTHOPAEDIC SURGERY

## 2024-09-24 PROCEDURE — 1159F MED LIST DOCD IN RCRD: CPT | Mod: CPTII,,, | Performed by: ORTHOPAEDIC SURGERY

## 2024-09-24 PROCEDURE — 99213 OFFICE O/P EST LOW 20 MIN: CPT | Mod: S$PBB,,, | Performed by: ORTHOPAEDIC SURGERY

## 2024-09-24 NOTE — PROGRESS NOTES
Javier Anderson presents for post-operative evaluation.  The patient is now   Six months s/p below procedures with Dr. Johnson on 4/17/24.    PROCEDURE(S) PERFORMED:    Right long finger amputation through the middle phalanx     She is doing okay.  She does request a mental health evaluation as she is having some difficulty coping with her finger injury.  She notes that she has had 2 episodes of transient numbness to the right hand which spontaneously resolved quickly both times.  No other complaints.    PE:    AA&O x 4.  NAD  HEENT:  NCAT, sclera nonicteric  Lungs:  Respirations are equal and unlabored.  CV:  2+ bilateral upper and lower extremity pulses.  MSK: The wound is healing well with no signs of erythema or warmth.  There is no drainage.  No clinical signs or symptoms of infection are present.  Full flexion of the long MCP,  moderate intact active flexion of the PIP. SILT. DNVI.  Negative Tinel's and median nerve compression test of the right wrist.    A/P: Status post above, doing well from a surgical perspective but  Still having some  difficulty coping with her finger injury    1)  at this point in time, I will place another referral to psychiatry for key onto per her request.  I have also offered her an external referral but she declines this.  I have also advised her to call the [facility] appointment line and request an appointment with psychology and or psychiatry.  I have also recommended she schedule an appointment online via the portal which she confirms she has access to.  At this point, she may continue unrestricted activities from a hand surgical perspective and follow up with me on an as-needed basis.  She declines additional OT.  Wrist brace for her 2 episodes of transient numbness in the right hand as treatment for mild carpal tunnel like symptoms.  These are not reproducible today and she will monitor this.  Follow up should these worsen or persist.      Ruel Johnson M.D.    Please be  aware that this note has been generated with the assistance of [employer] voice-to-text.  Please excuse any spelling or grammatical errors.    Thank you for choosing Dr. Ruel Johnson for your orthopedic hand and upper extremity care. It is our goal to provide you with exceptional care that will help keep you healthy, active, and get you back in the game.     If you felt that you received exemplary care today, please consider leaving feedback for Dr. Johnson on [employer] at [email or web address].    Please do not hesitate to reach out to us via email, phone, or MyChart with any questions, concerns, or feedback.

## 2024-11-25 ENCOUNTER — HOSPITAL ENCOUNTER (EMERGENCY)
Facility: HOSPITAL | Age: 34
Discharge: HOME | End: 2024-11-25
Attending: INTERNAL MEDICINE
Payer: MEDICAID

## 2024-11-25 VITALS
RESPIRATION RATE: 19 BRPM | OXYGEN SATURATION: 99 % | DIASTOLIC BLOOD PRESSURE: 70 MMHG | HEART RATE: 70 BPM | SYSTOLIC BLOOD PRESSURE: 120 MMHG | TEMPERATURE: 98 F | HEIGHT: 68 IN | BODY MASS INDEX: 32.13 KG/M2 | WEIGHT: 212 LBS

## 2024-11-25 DIAGNOSIS — N89.8 VAGINAL ITCHING: Primary | ICD-10-CM

## 2024-11-25 DIAGNOSIS — N39.0 URINARY TRACT INFECTION WITH HEMATURIA, SITE UNSPECIFIED: ICD-10-CM

## 2024-11-25 DIAGNOSIS — R31.9 URINARY TRACT INFECTION WITH HEMATURIA, SITE UNSPECIFIED: ICD-10-CM

## 2024-11-25 LAB
B-HCG UR QL: NEGATIVE
BILIRUB SERPL-MCNC: NORMAL MG/DL
BLOOD URINE, POC: NORMAL
CLARITY, UA: NORMAL
COLOR, UA: NORMAL
CTP QC/QA: YES
GLUCOSE UR QL STRIP: NORMAL
KETONES UR QL STRIP: NORMAL
LEUKOCYTE ESTERASE URINE, POC: NORMAL
NITRITE, POC UA: NORMAL
PH, POC UA: NORMAL
PROTEIN, POC: NORMAL
SPECIFIC GRAVITY, POC UA: NORMAL
UROBILINOGEN, POC UA: NORMAL

## 2024-11-25 PROCEDURE — 87086 URINE CULTURE/COLONY COUNT: CPT

## 2024-11-25 PROCEDURE — 87186 SC STD MICRODIL/AGAR DIL: CPT

## 2024-11-25 PROCEDURE — 99284 EMERGENCY DEPT VISIT MOD MDM: CPT | Mod: 25,ER

## 2024-11-25 PROCEDURE — 0352U VAGINOSIS SCREEN BY DNA PROBE: CPT

## 2024-11-25 PROCEDURE — 81025 URINE PREGNANCY TEST: CPT | Mod: ER

## 2024-11-25 PROCEDURE — 87088 URINE BACTERIA CULTURE: CPT

## 2024-11-25 PROCEDURE — 87591 N.GONORRHOEAE DNA AMP PROB: CPT

## 2024-11-25 RX ORDER — CEPHALEXIN 500 MG/1
1000 CAPSULE ORAL 2 TIMES DAILY
Qty: 28 CAPSULE | Refills: 0 | Status: SHIPPED | OUTPATIENT
Start: 2024-11-25 | End: 2024-12-02

## 2024-11-25 RX ORDER — METRONIDAZOLE 500 MG/1
500 TABLET ORAL 3 TIMES DAILY
Qty: 21 TABLET | Refills: 0 | Status: SHIPPED | OUTPATIENT
Start: 2024-11-25 | End: 2024-12-02

## 2024-11-26 NOTE — ED PROVIDER NOTES
"Encounter Date: 11/25/2024    SCRIBE #1 NOTE: I, Gene Echols, am scribing for, and in the presence of,  Izaiah Pal PA-C. I have scribed the following portions of the note - Other sections scribed: HPI,ROS,PE.       History     Chief Complaint   Patient presents with    Vaginal Itching     Reports showering with scented shower gel yesterday. Now having vaginal itching and discharge.     Javier Anderson is a 34 y.o. female, with no pertinent PMHx, who presents to the ED with vaginal itching onset "today". Patient reports an associated symptom of subjective vaginal discharge. Patient reports that she used a new soap yesterday and thinks that it is causing "an infection". She reports that she has not seen the discharge but that she "feels it". No other exacerbating or alleviating factors. Denies abdominal/pelvic pain, rash, dysuria, frequency or other associated symptoms.      The history is provided by the patient and medical records. No  was used.     Review of patient's allergies indicates:  No Known Allergies  Past Medical History:   Diagnosis Date    COVID-19      Past Surgical History:   Procedure Laterality Date    DILATION AND CURETTAGE OF UTERUS      EYE SURGERY      FINGER AMPUTATION Right 2/23/2024    Procedure: AMPUTATION, FINGER - right long finger through P2;  Surgeon: Ruel Johnson MD;  Location: [facility] OR;  Service: Orthopedics;  Laterality: Right;    FINGER AMPUTATION Right 4/17/2024    Procedure: PARTIAL AMPUTATION, FINGER - right long finger;  Surgeon: Ruel Johnson MD;  Location: [facility] OR;  Service: Orthopedics;  Laterality: Right;    FINGER TENDON REPAIR Right 6/23/2023    Procedure: REPAIR, TENDON, FINGER - Right Long finger I&D, poss tendon repair, poss skin graft vs integra;  Surgeon: Ruel Johnson MD;  Location: [facility] OR;  Service: Orthopedics;  Laterality: Right;     Family History   Problem Relation Name Age of Onset    Diabetes Mother      Diabetes Maternal " Grandmother      Diabetes Paternal Grandmother       Social History     Tobacco Use    Smoking status: Never    Smokeless tobacco: Never   Substance Use Topics    Alcohol use: Yes     Comment: less than one drink per week    Drug use: No     Review of Systems   Constitutional:  Negative for chills, diaphoresis, fatigue and fever.   HENT:  Negative for congestion, rhinorrhea and sore throat.    Eyes:  Negative for redness and visual disturbance.   Respiratory:  Negative for cough and shortness of breath.    Cardiovascular:  Negative for chest pain, palpitations and leg swelling.   Gastrointestinal:  Negative for abdominal pain, diarrhea, nausea and vomiting.   Genitourinary:  Positive for vaginal discharge (Subjective). Negative for decreased urine volume, difficulty urinating, dysuria, flank pain, frequency, genital sores, hematuria, menstrual problem, pelvic pain, urgency, vaginal bleeding and vaginal pain.        (+)Vaginal itching   Musculoskeletal:  Negative for arthralgias, back pain, myalgias, neck pain and neck stiffness.   Skin:  Negative for rash.   Neurological:  Negative for dizziness, weakness, light-headedness and headaches.   Hematological:  Does not bruise/bleed easily.       Physical Exam     Initial Vitals [11/25/24 2008]   BP Pulse Resp Temp SpO2   122/75 68 17 98 °F (36.7 °C) 100 %      MAP       --         Physical Exam    Nursing note and vitals reviewed.  Constitutional: Vital signs are normal. She appears well-developed and well-nourished. She is not diaphoretic.  Non-toxic appearance. She does not have a sickly appearance. She does not appear ill. No distress.   HENT:   Head: Normocephalic and atraumatic.   Right Ear: External ear normal.   Left Ear: External ear normal. Mouth/Throat: Mucous membranes are normal.   Eyes: Conjunctivae and EOM are normal.   Neck: Neck supple.   Normal range of motion.  Pulmonary/Chest: No stridor. No respiratory distress.   Abdominal: Abdomen is flat. She  "exhibits no distension, no pulsatile midline mass and no mass. There is no abdominal tenderness.   No right CVA tenderness.  No left CVA tenderness. There is no rebound and no guarding.   Genitourinary:    Genitourinary Comments: Refused       Musculoskeletal:         General: Normal range of motion.      Cervical back: Normal range of motion and neck supple.     Neurological: She is alert and oriented to person, place, and time.   Skin: No rash noted.   Psychiatric: She has a normal mood and affect. Thought content normal.         ED Course   Procedures  Labs Reviewed   CULTURE, URINE   VAGINOSIS SCREEN BY DNA PROBE   C. TRACHOMATIS/N. GONORRHOEAE BY AMP DNA   POCT URINE PREGNANCY       Result Value    POC Preg Test, Ur Negative       Acceptable Yes     POCT URINALYSIS W/O SCOPE    Color, UA POC        Clarity, UA, POC        Spec Grav UA        pH, UA        WBC, UA        Nitrite, UA        Protein, POC        Glucose, UA        Ketones, UA        Bilirubin, POC        Urobilinogen, UA        Blood, UA              Imaging Results    None          Medications - No data to display  Medical Decision Making  Javier Anderson is a 34 y.o. female, with no pertinent PMHx, who presents to the ED with vaginal itching onset "today". Patient reports an associated symptom of subjective vaginal discharge. Patient reports that she used a new soap yesterday and thinks that it is causing "an infection". She reports that she has not seen the discharge but that she "feels it". No other exacerbating or alleviating factors. Denies abdominal/pelvic pain, rash, dysuria, frequency or other associated symptoms.    Patient's chart and medical history reviewed.  Patient's vitals reviewed.  They are afebrile, no respiratory distress, nontoxic-appearing in the ED. Differential diagnosis is considered Gonorrhea, Chlamydia, Trichomonas, Bacterial Vaginosis, Yeast infection, UTI, Herpes, Syphilis, PID, TOA.  With vaginal itching " and subjective discharge after using new soap will treat for BV. Patient also has + nitrites on UA and will treat with keflex. Culture sent.  patient is refusing  exam today, discussed with patient that refusing exam may cause me to miss findings related to more concerning conditions. Patient acknowledges and refuses exam. Unlikely PID/TOA with no abdominal/pelvic pain/tenderness. Negative UPT.   Discussed with the patient that gonorrhea and chlamydia testing may take 2 or 3 days to result and that they will be contacted via telephone if result is positive.  Patient was also instructed to follow up with her primary care physician in the next 1-2 days for re-evaluation.  Patient agrees with this plan does not have any questions at this time.  Patient was currently hemodynamically stable and afebrile.  Discussed safe sexual practice in detail. Appropriate educational material was distributed. See orders for STD cultures and assays.    I discussed with the patient/family the diagnosis, treatment plan, indications for return to the emergency department, and for expected follow-up. The patient/family verbalized an understanding. The patient/family is asked if there are any questions or concerns. We discuss the case, until all issues are addressed to the patient/family's satisfaction. Patient/family understands and is agreeable to the plan.   FATMATA REYES    DISCLAIMER: This note was prepared with [employer] voice recognition transcription software. Garbled syntax, mangled pronouns, and other bizarre constructions may be attributed to that software system.        Amount and/or Complexity of Data Reviewed  Labs: ordered. Decision-making details documented in ED Course.    Risk  Prescription drug management.            Scribe Attestation:   Scribe #1: I performed the above scribed service and the documentation accurately describes the services I performed. I attest to the accuracy of the note.        ED Course as of  11/25/24 2111 Mon Nov 25, 2024 2036 SpO2: 100 % [AF]   2036 Resp: 17 [AF]   2036 Pulse: 68 [AF]   2036 Temp: 98 °F (36.7 °C) [AF]   2036 BP: 122/75 [AF]   2057 + Nitrites   [AF]      ED Course User Index  [AF] Izaiah Pal PA-C I, Alain David Flexer, PA-C, personally performed the services described in this documentation. All medical record entries made by the scribe were at my direction and in my presence. I have reviewed the chart and agree that the record reflects my personal performance and is accurate and complete.      DISCLAIMER: This note was prepared with [employer] voice recognition transcription software. Garbled syntax, mangled pronouns, and other bizarre constructions may be attributed to that software system.                  Clinical Impression:  Final diagnoses:  [N89.8] Vaginal itching (Primary)  [N39.0, R31.9] Urinary tract infection with hematuria, site unspecified          ED Disposition Condition    Discharge Stable          ED Prescriptions       Medication Sig Dispense Start Date End Date Auth. Provider    cephALEXin (KEFLEX) 500 MG capsule Take 2 capsules (1,000 mg total) by mouth 2 (two) times a day. for 7 days 28 capsule 11/25/2024 12/2/2024 Izaiah Pal PA-C    metroNIDAZOLE (FLAGYL) 500 MG tablet Take 1 tablet (500 mg total) by mouth 3 (three) times daily. for 7 days 21 tablet 11/25/2024 12/2/2024 Izaiah Pal PA-C          Follow-up Information       Follow up With Specialties Details Why Contact Info    [facility] -  Schedule an appointment as soon as possible for a visit in 1 day for follow up if you do not currently have a  [address] [place] [address] [phone]      Your primary care physician  Schedule an appointment as soon as possible for a visit in 1 day for follow up     [facility] ED Emergency Medicine Go to  If symptoms worsen [address] [id number] [phone]             Izaiah Pal  DELISA  11/25/24 0194

## 2024-11-26 NOTE — DISCHARGE INSTRUCTIONS
Thank you for coming to our Emergency Department today. It is important to remember that some problems or medical conditions are difficult to diagnose and may not be found or addressed during your Emergency Department visit.  These conditions often start with non-specific symptoms and can only be diagnosed on follow up visits with your primary care physician or specialist when the symptoms continue or change. Please remember that all medical conditions can change, and we cannot predict how you will be feeling tomorrow or the next day. Return to the ER with any questions/concerns, new/concerning symptoms including fever, chest pain, shortness of breath, loss of consciousness, dizziness, weakness, worsening symptoms, failure to improve, or any other concerns. Also, please follow up with your Primary Care Physician and/or Pediatrician in the next 1-2 days to review your ED visit in entirety and for re-evaluation.   Be sure to follow up with your primary care doctor and review all labs/imaging/tests that were performed during your ER visit with them. It is very common for us to identify non-emergent incidental findings which must be followed up with your primary care physician.  Some labs/imaging/tests may be outside of the normal range, and require non-emergent follow-up and/or further investigation/treatment/procedures/testing to help diagnose/exclude/prevent complications or other potentially serious medical conditions. Some abnormalities may not have been discussed or addressed during your ER visit. Some lab results may not return during your ER visit but can be accessible by downloading the free [facility] [email or web address] or by visiting [email or web address] . It is important for you to review all labs/imaging/tests which are outside of the normal range with your physician.  An ER visit does not replace a primary care visit, and many screening tests or follow-up tests cannot be ordered by an ER doctor or performed by  the ER. Some tests may even require pre-approval.  If you do not have a primary care doctor, you may contact the one listed on your discharge paperwork or you may also call the [facility] Appointment Desk at [phone] , or [address] [place] [address] at  [phone] to schedule an appointment, or establish care with a primary care doctor or even a specialist and to obtain information about local resources. It is important to your health that you have a primary care doctor.  Please take all medications as directed. We have done our best to select a medication for you that will treat your condition however, all medications may potentially have side-effects and it is impossible to predict which medications may give you side-effects or what those side-effects (if any) those medications may give you.  If you feel that you are having a negative effect or side-effect of any medication you should stop taking those medications immediately and seek medical attention. If you feel that you are having a life-threatening reaction call 911.  Do not drive, swim, climb to height, take a bath, operate heavy machinery, drink alcohol or take potentially sedating medications, sign any legal documents or make any important decisions for 24 hours if you have received any pain medications, sedatives or mood altering drugs during your ER visit or within 24 hours of taking them if they have been prescribed to you.   You can find additional resources for Dentists, hearing aids, durable medical equipment, low cost pharmacies and other resources at [email or web address]  Patient agrees with this plan. Discussed with her strict return precautions, they verbalized understanding. Patient is stable for discharge.   § Please take all medication as prescribed.

## 2024-11-27 ENCOUNTER — TELEPHONE (OUTPATIENT)
Dept: EMERGENCY MEDICINE | Facility: HOSPITAL | Age: 34
End: 2024-11-27
Payer: MEDICAID

## 2024-11-27 LAB
BACTERIA UR CULT: ABNORMAL
BACTERIAL VAGINOSIS DNA: DETECTED
C TRACH DNA SPEC QL NAA+PROBE: NOT DETECTED
CANDIDA GLABRATA/KRUSEI: NOT DETECTED
CANDIDA RRNA VAG QL PROBE: NOT DETECTED
N GONORRHOEA DNA SPEC QL NAA+PROBE: NOT DETECTED
TRICHOMONAS VAGINALIS: NOT DETECTED

## 2024-11-27 RX ORDER — NITROFURANTOIN 25; 75 MG/1; MG/1
100 CAPSULE ORAL 2 TIMES DAILY
Qty: 10 CAPSULE | Refills: 0 | Status: SHIPPED | OUTPATIENT
Start: 2024-11-27 | End: 2024-12-02

## 2025-01-15 ENCOUNTER — HOSPITAL ENCOUNTER (EMERGENCY)
Facility: HOSPITAL | Age: 35
Discharge: HOME | End: 2025-01-15
Attending: EMERGENCY MEDICINE
Payer: MEDICAID

## 2025-01-15 VITALS
OXYGEN SATURATION: 99 % | HEIGHT: 68 IN | SYSTOLIC BLOOD PRESSURE: 108 MMHG | BODY MASS INDEX: 31.52 KG/M2 | DIASTOLIC BLOOD PRESSURE: 70 MMHG | RESPIRATION RATE: 18 BRPM | WEIGHT: 208 LBS | HEART RATE: 76 BPM | TEMPERATURE: 98 F

## 2025-01-15 DIAGNOSIS — B34.9 VIRAL SYNDROME: Primary | ICD-10-CM

## 2025-01-15 LAB
B-HCG UR QL: NEGATIVE
CTP QC/QA: YES
POC RAPID STREP A: NEGATIVE

## 2025-01-15 PROCEDURE — 81025 URINE PREGNANCY TEST: CPT | Mod: ER

## 2025-01-15 PROCEDURE — 87880 STREP A ASSAY W/OPTIC: CPT | Mod: ER

## 2025-01-15 PROCEDURE — 99283 EMERGENCY DEPT VISIT LOW MDM: CPT | Mod: 25,ER

## 2025-01-15 RX ORDER — IBUPROFEN 600 MG/1
600 TABLET ORAL EVERY 6 HOURS PRN
Qty: 30 TABLET | Refills: 0 | Status: SHIPPED | OUTPATIENT
Start: 2025-01-15

## 2025-01-15 RX ORDER — ACETAMINOPHEN 500 MG
500 TABLET ORAL EVERY 4 HOURS PRN
Qty: 30 TABLET | Refills: 0 | Status: SHIPPED | OUTPATIENT
Start: 2025-01-15

## 2025-01-15 NOTE — ED PROVIDER NOTES
Encounter Date: 1/15/2025       History     Chief Complaint   Patient presents with    Cough     Productive yellow cough with headache and sore throat x today     34-year-old female with no past medical history presents to ED for emergent evaluation of generalized headache that started today.  Patient states that her son tested positive for the flu 2 days ago.  She states her daughter also has similar symptoms.  She denies any attempted treatment.  She denies any fever, chills, chest pain, shortness of breath, abdominal pain, nausea, vomiting, diarrhea, dysuria, hematuria, dizziness, lightheadedness.  No other symptoms reported.    The history is provided by the patient. No  was used.     Review of patient's allergies indicates:  No Known Allergies  Past Medical History:   Diagnosis Date    COVID-19      Past Surgical History:   Procedure Laterality Date    DILATION AND CURETTAGE OF UTERUS      EYE SURGERY      FINGER AMPUTATION Right 2/23/2024    Procedure: AMPUTATION, FINGER - right long finger through P2;  Surgeon: Ruel Johnson MD;  Location: [facility] OR;  Service: Orthopedics;  Laterality: Right;    FINGER AMPUTATION Right 4/17/2024    Procedure: PARTIAL AMPUTATION, FINGER - right long finger;  Surgeon: Ruel Johnson MD;  Location: [facility] OR;  Service: Orthopedics;  Laterality: Right;    FINGER TENDON REPAIR Right 6/23/2023    Procedure: REPAIR, TENDON, FINGER - Right Long finger I&D, poss tendon repair, poss skin graft vs integra;  Surgeon: Ruel Johnson MD;  Location: [facility] OR;  Service: Orthopedics;  Laterality: Right;     Family History   Problem Relation Name Age of Onset    Diabetes Mother      Diabetes Maternal Grandmother      Diabetes Paternal Grandmother       Social History     Tobacco Use    Smoking status: Never    Smokeless tobacco: Never   Substance Use Topics    Alcohol use: Yes     Comment: less than one drink per week    Drug use: No     Review of Systems   Constitutional:   Negative for chills and fever.   HENT:  Negative for congestion, ear pain, rhinorrhea and sore throat.    Eyes:  Negative for redness.   Respiratory:  Negative for cough and shortness of breath.    Cardiovascular:  Negative for chest pain.   Gastrointestinal:  Negative for abdominal pain, diarrhea, nausea and vomiting.   Genitourinary:  Negative for decreased urine volume, difficulty urinating, dysuria, frequency, hematuria and urgency.   Musculoskeletal:  Negative for back pain and neck pain.   Skin:  Negative for rash.   Neurological:  Positive for headaches. Negative for dizziness, tremors, seizures, syncope, facial asymmetry, speech difficulty, weakness, light-headedness and numbness.   Psychiatric/Behavioral:  Negative for confusion.        Physical Exam     Initial Vitals [01/15/25 1243]   BP Pulse Resp Temp SpO2   108/70 76 18 97.8 °F (36.6 °C) 99 %      MAP       --         Physical Exam    Nursing note and vitals reviewed.  Constitutional: She appears well-developed and well-nourished.  Non-toxic appearance. She does not appear ill.   HENT:   Head: Normocephalic and atraumatic.   Right Ear: Hearing, tympanic membrane, external ear and ear canal normal. Tympanic membrane is not perforated, not erythematous and not bulging.   Left Ear: Hearing, tympanic membrane, external ear and ear canal normal. Tympanic membrane is not perforated, not erythematous and not bulging.   Nose: Nose normal. Mouth/Throat: Uvula is midline, oropharynx is clear and moist and mucous membranes are normal.   Eyes: Conjunctivae and EOM are normal.   Neck: Neck supple.   Normal range of motion.   Full passive range of motion without pain.     Cardiovascular:  Normal rate and regular rhythm.           Pulses:       Radial pulses are 2+ on the right side and 2+ on the left side.   Pulmonary/Chest: Effort normal and breath sounds normal. No accessory muscle usage. No respiratory distress. She has no decreased breath sounds.   Abdominal:  Abdomen is soft. Bowel sounds are normal. She exhibits no distension. There is no abdominal tenderness. There is no rebound and no guarding.   Musculoskeletal:         General: Normal range of motion.      Cervical back: Full passive range of motion without pain, normal range of motion and neck supple. No rigidity.     Neurological: She is alert. No cranial nerve deficit.   Neuro intact.  Strength and sensation intact bilateral upper and lower extremities.  Cranial nerves intact.  Patient is ambulating well with a steady gait.   Skin: Skin is warm and dry.   Psychiatric: She has a normal mood and affect.         ED Course   Procedures  Labs Reviewed   POCT URINE PREGNANCY       Result Value    POC Preg Test, Ur Negative       Acceptable Yes     POCT STREP A MOLECULAR   POCT STREP A, RAPID    POC Rapid Strep A negative            Imaging Results    None          Medications - No data to display  Medical Decision Making  This is a 34-year-old female with no past medical history presents to ED for emergent evaluation of generalized headache that started today.  Patient states that her son tested positive for the flu 2 days ago.  She states her daughter also has similar symptoms.  She denies any attempted treatment.  She denies any fever, chills, chest pain, shortness of breath, abdominal pain, nausea, vomiting, diarrhea, dysuria, hematuria, dizziness, lightheadedness.  No other symptoms reported.    On physical exam, patient is well-appearing and in no acute distress.  Nontoxic appearing.  Lungs are clear to auscultation bilaterally.  Abdomen is soft and nontender.  No guarding, rigidity, rebound.  2+ radial pulses bilaterally.  Posterior oropharynx is not erythematous.  No edema or exudate.  Uvula midline.  Bilateral tympanic membrane is normal.  No erythema, bulging, or perforations.  Neuro intact.  Strength and sensation intact bilateral upper and lower extremities.  Cranial nerves intact.  Patient is  ambulating well with a steady gait.  Full range motion of neck.  No neck rigidity.  Strep negative.  UPT negative.  Patient likely has a flu given sick contact.  Will discharge patient on Tylenol and ibuprofen p.r.n. for pain.  Urged prompt follow-up with PCP for further evaluation.    Strict return precautions given. I discussed with the patient/family the diagnosis, treatment plan, indications for return to the emergency department, and for expected follow-up. The patient/family verbalized an understanding. The patient/family is asked if there are any questions or concerns. We discuss the case, until all issues are addressed to the patient/family's satisfaction. Patient/family understands and is agreeable to the plan. Patient is stable and ready for discharge.      Amount and/or Complexity of Data Reviewed  Labs: ordered.    Risk  OTC drugs.  Prescription drug management.                                      Clinical Impression:  Final diagnoses:  [B34.9] Viral syndrome (Primary)          ED Disposition Condition    Discharge Stable          ED Prescriptions       Medication Sig Dispense Start Date End Date Auth. Provider    acetaminophen (TYLENOL) 500 MG tablet Take 1 tablet (500 mg total) by mouth every 4 (four) hours as needed for Pain or Temperature greater than (100.5 or greater). 30 tablet 1/15/2025 -- Tyler Kwon PA-C    ibuprofen (ADVIL,MOTRIN) 600 MG tablet Take 1 tablet (600 mg total) by mouth every 6 (six) hours as needed for Pain or Temperature greater than (100.5 or greater). 30 tablet 1/15/2025 -- Tyler Kwon PA-C          Follow-up Information       Follow up With Specialties Details Why Contact [facility] -  Schedule an appointment as soon as possible for a visit in 2 days for further evaluation [address]  [place] [address]  [phone]      [facility] In 2 days If symptoms worsen [address] [facility]  [address]  [phone]             Tyler Kwon PA-C  01/15/25 1428

## 2025-01-15 NOTE — DISCHARGE INSTRUCTIONS

## 2025-02-12 ENCOUNTER — TELEPHONE (OUTPATIENT)
Dept: ORTHOPEDICS | Facility: CLINIC | Age: 35
End: 2025-02-12
Payer: MEDICAID

## 2025-03-11 ENCOUNTER — TELEPHONE (OUTPATIENT)
Dept: ORTHOPEDICS | Facility: CLINIC | Age: 35
End: 2025-03-11
Payer: MEDICAID

## 2025-03-11 NOTE — TELEPHONE ENCOUNTER
----- Message from Med Assistant Gusman sent at 3/11/2025  1:24 PM CDT -----  Regarding: FW: 6 month Follow Up  Contact: [phone]  Please let her know that we are still unable to schedule another follow up until she has the MRI that  wants her to have.  ----- Message -----  From: Thony Cerda  Sent: 3/11/2025   9:45 AM CDT  To: [facility] Staff  Subject: 6 month Follow Up                                Pt is calling in ref to scheduling her 6 month follow up with provider. She missed a call in February. Patient Requesting Call Back @  [phone]

## 2025-04-03 ENCOUNTER — PATIENT MESSAGE (OUTPATIENT)
Dept: ORTHOPEDICS | Facility: CLINIC | Age: 35
End: 2025-04-03
Payer: MEDICAID

## 2025-04-08 ENCOUNTER — TELEPHONE (OUTPATIENT)
Dept: ORTHOPEDICS | Facility: CLINIC | Age: 35
End: 2025-04-08
Payer: MEDICAID

## 2025-04-08 NOTE — TELEPHONE ENCOUNTER
----- Message from Radha sent at 4/8/2025 10:59 AM CDT -----  Regarding: Appt  Contact: pt [phone]  Pt is requesting call back to discuss rescheduling missed appt today 4/8 due to transportation issue, please call pt @[phone]

## 2025-04-16 ENCOUNTER — PATIENT MESSAGE (OUTPATIENT)
Dept: ORTHOPEDICS | Facility: CLINIC | Age: 35
End: 2025-04-16
Payer: MEDICAID

## 2025-04-16 DIAGNOSIS — M79.641 RIGHT HAND PAIN: Primary | ICD-10-CM

## 2025-04-23 ENCOUNTER — TELEPHONE (OUTPATIENT)
Dept: ORTHOPEDICS | Facility: CLINIC | Age: 35
End: 2025-04-23
Payer: MEDICAID

## 2025-04-24 ENCOUNTER — OFFICE VISIT (OUTPATIENT)
Dept: ORTHOPEDICS | Facility: CLINIC | Age: 35
End: 2025-04-24
Payer: MEDICAID

## 2025-04-24 ENCOUNTER — HOSPITAL ENCOUNTER (OUTPATIENT)
Dept: RADIOLOGY | Facility: OTHER | Age: 35
Discharge: HOME | End: 2025-04-24
Attending: ORTHOPAEDIC SURGERY
Payer: MEDICAID

## 2025-04-24 DIAGNOSIS — F32.89 OTHER DEPRESSION: ICD-10-CM

## 2025-04-24 DIAGNOSIS — M79.641 RIGHT HAND PAIN: ICD-10-CM

## 2025-04-24 DIAGNOSIS — R29.898 RIGHT HAND WEAKNESS: Primary | ICD-10-CM

## 2025-04-24 DIAGNOSIS — F43.10 PTSD (POST-TRAUMATIC STRESS DISORDER): ICD-10-CM

## 2025-04-24 PROCEDURE — 99999 PR PBB SHADOW E&M-EST. PATIENT-LVL IV: CPT | Mod: PBBFAC,,, | Performed by: ORTHOPAEDIC SURGERY

## 2025-04-24 PROCEDURE — 73130 X-RAY EXAM OF HAND: CPT | Mod: 26,RT,, | Performed by: RADIOLOGY

## 2025-04-24 PROCEDURE — 73130 X-RAY EXAM OF HAND: CPT | Mod: TC,FY,RT

## 2025-04-24 PROCEDURE — 99214 OFFICE O/P EST MOD 30 MIN: CPT | Mod: S$PBB,,, | Performed by: ORTHOPAEDIC SURGERY

## 2025-04-24 PROCEDURE — 1159F MED LIST DOCD IN RCRD: CPT | Mod: CPTII,,, | Performed by: ORTHOPAEDIC SURGERY

## 2025-04-24 PROCEDURE — 99214 OFFICE O/P EST MOD 30 MIN: CPT | Mod: PBBFAC,25 | Performed by: ORTHOPAEDIC SURGERY

## 2025-04-24 NOTE — PROGRESS NOTES
Javier Anderson presents for post-operative evaluation.  The patient is now 1 year s/p below procedures with Dr. Johnson on 4/17/24.    PROCEDURE(S) PERFORMED:    Right long finger amputation through the middle phalanx     She is doing okay.  She remains frustrated because she reports that she can not get in touch with her worker's comp  and would like to attend more occupational therapy but this has not been possible due to lack of communication with her worker's comp team.  Additionally, she requests a referral to a psychologist which has been placed previously but she has been unsuccessful in getting this scheduled at [facility].  She does note that she found an external place that we will be able to see her and request an external referral today which I will be happy to provide for her.      PE:    AA&O x 4.  NAD  HEENT:  NCAT, sclera nonicteric  Lungs:  Respirations are equal and unlabored.  CV:  2+ bilateral upper and lower extremity pulses.  MSK: The wound is healing well with no signs of erythema or warmth.  There is no drainage.  No clinical signs or symptoms of infection are present.  Full flexion of the long MCP,  moderate intact active flexion of the PIP. SILT. DNVI.  Negative Tinel's and median nerve compression test of the right wrist.    Imaging:  Expected postop changes status post right long finger amputation with no complications    A/P:  Patient status post right long finger amputation 1 year ago      1)  at this point in time, I will place new referrals to OT per her request.  She will reach out to her  for assistance in getting this scheduled.  She does need some strengthening.  Additionally I will provide her an external psychology referral per her request.  She may continue unrestricted activities and follow up with me as needed.          Ruel Johnson M.D.    Please be aware that this note has been generated with the assistance of [employer] voice-to-text.  Please excuse any  spelling or grammatical errors.    Thank you for choosing Dr. Ruel Johnson for your orthopedic hand and upper extremity care. It is our goal to provide you with exceptional care that will help keep you healthy, active, and get you back in the game.     If you felt that you received exemplary care today, please consider leaving feedback for Dr. Johnson on [employer] at [email or web address].    Please do not hesitate to reach out to us via email, phone, or MyChart with any questions, concerns, or feedback.

## 2025-04-29 ENCOUNTER — CLINICAL SUPPORT (OUTPATIENT)
Dept: REHABILITATION | Facility: HOSPITAL | Age: 35
End: 2025-04-29
Attending: ORTHOPAEDIC SURGERY
Payer: OTHER GOVERNMENT

## 2025-04-29 DIAGNOSIS — M79.641 PAIN IN RIGHT HAND: ICD-10-CM

## 2025-04-29 DIAGNOSIS — M79.644 PAIN OF FINGER OF RIGHT HAND: Primary | ICD-10-CM

## 2025-04-29 DIAGNOSIS — R29.898 RIGHT HAND WEAKNESS: ICD-10-CM

## 2025-04-29 PROCEDURE — 97168 OT RE-EVAL EST PLAN CARE: CPT

## 2025-04-29 NOTE — PROGRESS NOTES
"  Outpatient Rehab  OT Re-Evaluation    Patient Name: Javier Anderson  MRN: [id number]  YOB: 1990  Encounter Date: 4/29/2025    Therapy Diagnosis:   Encounter Diagnoses   Name Primary?    Right hand weakness     Pain of finger of right hand Yes    Pain in right hand      Physician: Ruel Johnson MD    Physician Orders: Eval and Treat  Medical Diagnosis: Right hand weakness    Visit # / Visits Authorized: 1 / 12  Insurance Authorization Period: 4/24/2025 to 4/24/2026  Date of Evaluation: 4/29/2025  Plan of Care Certification: 4/29/2025 to 6/11/2025     Time In: 1330   Time Out: 1430  Total Time: 60   Total Billable Time: 60    Intake Outcome Measure for FOTO Survey    Therapist reviewed FOTO scores for Javier Anderson on 4/29/2025.   FOTO report - see Media section or FOTO account episode details.     Intake Score: 34%    Precautions       Standard    Subjective   History of Present Illness  Javier is a 34 y.o. female who reports to occupational therapy with a chief concern of Functional limitations of right hand.     The patient reports a medical diagnosis of Right hand weakness.  Patient reports a surgery of See sx hx  below.          Dominant Hand: Right    Activities of Daily Living  Social history was obtained from Patient.    General Prior Level of Function Comments: Independent  General Current Level of Function Comments: limited             Currently independent with activities of daily living? No     Difficulty tying shoes, writing, opening bottles, "I can harly use my right hand. Reports that she drops thing.         Pain     Patient reports a current pain level of 8/10. Pain at best is reported as 6/10. Pain at worst is reported as 10/10.   Location: At amputation site.  Clinical Progression (since onset): Unchanged  Pain-Relieving Factors: Other (Comment)  Other Pain-Relieving Factors: "When I try to use my hand."  "Not constant" "It will come and go." "I get frustrated." "       Employment  Employment Status: Not employed          Past Medical History/Physical Systems Review:   Javier Anderson  has a past medical history of COVID-19.    Javier Anderson  has a past surgical history that includes Eye surgery; Dilation and curettage of uterus; Finger tendon repair (Right, 6/23/2023); Finger amputation (Right, 2/23/2024); and Finger amputation (Right, 4/17/2024).    Javier has a current medication list which includes the following prescription(s): acetaminophen, acetaminophen, amoxicillin, azithromycin, benzocaine-menthol, gabapentin, hydrocodone-acetaminophen, ibuprofen, ibuprofen, ibuprofen, medroxyprogesterone, naloxone, oxycodone-acetaminophen, oxycodone-acetaminophen, promethazine hcl, and promethazine-dextromethorphan, and the following Facility-Administered Medications: fentanyl, haloperidol lactate, methocarbamol, midazolam, ondansetron, and sodium chloride 0.9%.    Review of patient's allergies indicates:  No Known Allergies     Objective       Digit 2 - Index Finger Active Range of Motion  Right Index Finger   Extension (deg) Flexion (deg) Pain   MCP 0 80     PIP 0 98     DIP 0 21     CRUZ: 199      Digit 3 - Middle Finger Active Range of Motion  Right Middle Finger   Extension (deg) Flexion (deg) Pain   MCP 0 90     PIP 0 56     DIP         CRUZ:        Digit 4 - Ring Finger Active Range of Motion  Right Ring Finger   Extension (deg) Flexion (deg) Pain   MCP 0 80     PIP 0 92     DIP 0 28     CRUZ: 200      Digit 5 - Little Finger Active Range of Motion  Right Little Finger   Extension (deg) Flexion (deg) Pain   MCP 0 83     PIP 0 103     DIP 0 4     CRUZ: 190                        Right  Strength  Right Hand Dynamometer Position: 2  Elbow Position Forearm Position Trial 1 (lbs) Trial 2  (lbs) Trial 3  (lbs) Average  (lbs) Pain   Flexed Neutral 15               Left  Strength  Left Hand Dynamometer Position: 2  Elbow Position Forearm Position Trial 1 (lbs) Trial 2 (lbs)  Trial 3 (lbs) Average (lbs) Pain   Flexed Neutral 39               Five-Position  Strength Measure   Setting 1 (lbs) Setting 2 (lbs) Setting 3 (lbs) Setting 4 (lbs) Setting 5 (lbs)   Right 7 15 18 10 19   Left                 Right Pinch Strength   Trial 1 (lbs) Trial 2 (lbs) Trial 3 (lbs) Average (lbs) Pain   Lateral (Key Pinch) 8           Three Point (Three Jaw Enrrique)             Two Point (Tip to Tip) 8               Left Pinch Strength   Trial 1 (lbs) Trial 2 (lbs) Trial 3 (lbs) Average (lbs) Pain   Lateral (Key Pinch) 16           Three Point (Three Jaw Enrrique)             Two Point (Tip to Tip) 9                 /Pinch Details  5-Rung plot reveals abnormal curve progression. Abnormal movement patterns noted during  assessment with patient shaking the dynamometer at times.           Time Entry(in minutes):  OT Re-Evaluation Time Entry: 60    Assessment & Plan   Assessment  Javier presents with a condition of Low complexity.   Presentation of Symptoms: Stable  Will Comorbidities Impact Care: No       ADL Limitations : Dressing, Personal hygiene and grooming, Personal device care  Functional Limitations: Activity tolerance, Carrying objects, Fine motor coordination, Manipulating objects, Pain with ADLs/IADLs, Proprioception, Range of motion, Sensory processing                    Patient Goal for Therapy (OT): Return to prior level of function,  Prognosis: Fair  Prognosis Details: Duration of condition.       Patient's spiritual, cultural, and educational needs considered and patient agreeable to plan of care and goals.           Goals:   Active       Long Term Goals       Pt will report a pain level of 2 out of 10 with ADLs/IADLs        Start:  04/30/25    Expected End:  06/11/25             Pt will demo improved FOTO score by 40 points.         Start:  04/30/25    Expected End:  06/11/25             Pt will return to prior level of function for ADLs /IADLs        Start:  04/30/25    Expected End:   06/11/25               Short Term Goals        Pt will report a pain level of 2 out of 10 with use in light ADLs.        Start:  04/30/25    Expected End:  05/21/25            Pt will demo improved FOTO score by 20 points.        Start:  04/30/25    Expected End:  05/21/25            Pt will reports independence in light ADL's with use of the involved Hand/wrist/UE        Start:  04/30/25    Expected End:  05/21/25             Pt will demo full fist       Start:  04/30/25    Expected End:  06/11/25                Linda Castaneda OT

## 2025-04-30 PROBLEM — M79.641 PAIN IN RIGHT HAND: Status: ACTIVE | Noted: 2025-04-30

## 2025-05-05 ENCOUNTER — CLINICAL SUPPORT (OUTPATIENT)
Dept: REHABILITATION | Facility: HOSPITAL | Age: 35
End: 2025-05-05
Payer: OTHER GOVERNMENT

## 2025-05-05 DIAGNOSIS — M79.641 PAIN IN RIGHT HAND: Primary | ICD-10-CM

## 2025-05-05 PROCEDURE — 97530 THERAPEUTIC ACTIVITIES: CPT | Mod: CO

## 2025-05-05 PROCEDURE — 97022 WHIRLPOOL THERAPY: CPT | Mod: CO

## 2025-05-05 NOTE — PROGRESS NOTES
Occupational Therapy Visit    Patient Name: Javier Anderson  MRN: [id number]  YOB: 1990  Encounter Date: 5/5/2025    Therapy Diagnosis:   Encounter Diagnosis   Name Primary?    Pain in right hand Yes     Physician: Ruel Johnson MD    Physician Orders: Eval and Treat  Medical Diagnosis: Right hand weakness    Visit # / Visits Authorized: 2 / 12  Insurance Authorization Period: 4/24/2025 to 4/24/2026  Date of Evaluation: 4/29/2025  Plan of Care Certification: 4/29/2025 to 6/11/2025     Time In: 1000   Time Out: 1045  Total Time (in minutes): 45   Total Billable Time (in minutes):      FOTO:  Intake Score:  %  Survey Score 2:  %  Survey Score 3:  %         Subjective   still having sensation issues.    not rated, no c/o    Objective            Treatment:  Therapeutic Activity  TA 1: towel walks x 3 min  TA 2: octy x 3 min  TA 3: yellow flexbar wf/we twists, frowns, smiles x 20 ea  TA 4: yellow power web pushes and pulls x20  TA 5: in hand manip with small pegs, remove with PHG silver 1st ring  TA 6: marble maze x 2 min  TA 7: alphabet ball x 1 set  Modalities  Fluidotherapy: Fluidotherapy: To involved hand for 10 min, continuous air, 115 deg, air speed 50 to decrease pain, edema & scar tissue, sensory re- education, and increased tissue extensibility prior to therex    Time Entry(in minutes):  Whirlpool Time Entry: 10  Therapeutic Activity Time Entry: 35    Assessment & Plan   Assessment: poor attn to task to todays tx.  Evaluation/Treatment Tolerance: Patient tolerated treatment well    Patient will continue to benefit from skilled outpatient occupational therapy to address the deficits listed in the problem list box on initial evaluation, provide pt/family education and to maximize pt's level of independence in the home and community environment.     Patient's spiritual, cultural, and educational needs considered and patient agreeable to plan of care and goals.       Client Care conference  completed with evaluating therapist in regards to this patients POC as evidenced by co signature of supervising therapist.      Plan: cont to address OT goals as tolerated    Goals:   Active       Long Term Goals       Pt will report a pain level of 2 out of 10 with ADLs/IADLs  (Progressing)       Start:  04/30/25    Expected End:  06/11/25             Pt will demo improved FOTO score by 40 points.   (Progressing)       Start:  04/30/25    Expected End:  06/11/25             Pt will return to prior level of function for ADLs /IADLs  (Progressing)       Start:  04/30/25    Expected End:  06/11/25               Short Term Goals        Pt will report a pain level of 2 out of 10 with use in light ADLs.  (Progressing)       Start:  04/30/25    Expected End:  05/21/25            Pt will demo improved FOTO score by 20 points.  (Progressing)       Start:  04/30/25    Expected End:  05/21/25            Pt will reports independence in light ADL's with use of the involved Hand/wrist/UE  (Progressing)       Start:  04/30/25    Expected End:  05/21/25             Pt will demo full fist (Progressing)       Start:  04/30/25    Expected End:  06/11/25                JUAN ALBERTO Alva/NINO

## 2025-05-09 NOTE — PROGRESS NOTES
Outpatient Rehab    Occupational Therapy Visit    Patient Name: Javier Anderson  MRN: [id number]  YOB: 1990  Encounter Date: 5/12/2025    Therapy Diagnosis:   Encounter Diagnoses   Name Primary?    Pain in right hand Yes    Pain of finger of right hand      Physician: Ruel Johnson MD    Physician Orders: Eval and Treat  Medical Diagnosis: Right hand weakness    Visit # / Visits Authorized: 3 / 12  Insurance Authorization Period: 4/24/2025 to 4/24/2026  Surgical Procedure and Date: 6/23/23,   1. Irrigation and sharp excisional debridement right long finger with debridement of portions of nonviable skin, subcutaneous tissue, tendon, and bone  2. Removal of foreign material right long finger open wounds  3. Extensor tendon repair in zone 1 right long finger  4. Escharotomy right long finger  5. Transarticular extension pinning of the DIP joint of the right long finger  6. Placement of Integra skin substitute to the right long finger, 2 distinct wounds  7. Nail plate removal right long finger  Onset Date: 6/22/23  Evaluation Date: 7/27/2023     Time In: 0955   Time Out: 1045  Total Time (in minutes): 50   Total Billable Time (in minutes):      FOTO:  Intake Score:  %  Survey Score 2:  %  Survey Score 3:  %         Subjective   difficulty turning things.    not rated, no c/o    Objective            Treatment:  Therapeutic Activity  TA 1: towel walks x 3 min  TA 2: octy x 3 min  TA 3: red flexbar wf/we/rd/ud twists, frowns, smiles x 20 ea  TA 4: red power web pushes and pulls x20  TA 5: in hand manip with small pegs, remove with PHG silver 1st ring  TA 6: true balance x 2 min  TA 8: in hand manip with coins  TA 9: liberty stick x 3 min  TA 10: vibration massage for sensory oj  Modalities  Fluidotherapy: Fluidotherapy: To involved hand for 10 min, continuous air, 115 deg, air speed 50 to decrease pain, edema & scar tissue, sensory re- education, and increased tissue extensibility prior to therex    Time  Entry(in minutes):  Whirlpool Time Entry: 10  Therapeutic Activity Time Entry: 40    Assessment & Plan   Assessment: mild fatigue with strengthening tasks  Evaluation/Treatment Tolerance: Patient tolerated treatment well    Patient will continue to benefit from skilled outpatient occupational therapy to address the deficits listed in the problem list box on initial evaluation, provide pt/family education and to maximize pt's level of independence in the home and community environment.     Patient's spiritual, cultural, and educational needs considered and patient agreeable to plan of care and goals.           Plan: cont to address OT goals as tolerated    Goals:   Active       Long Term Goals       Pt will report a pain level of 2 out of 10 with ADLs/IADLs  (Progressing)       Start:  04/30/25    Expected End:  06/11/25             Pt will demo improved FOTO score by 40 points.   (Progressing)       Start:  04/30/25    Expected End:  06/11/25             Pt will return to prior level of function for ADLs /IADLs  (Progressing)       Start:  04/30/25    Expected End:  06/11/25               Short Term Goals        Pt will report a pain level of 2 out of 10 with use in light ADLs.  (Progressing)       Start:  04/30/25    Expected End:  05/21/25            Pt will demo improved FOTO score by 20 points.  (Progressing)       Start:  04/30/25    Expected End:  05/21/25            Pt will reports independence in light ADL's with use of the involved Hand/wrist/UE  (Progressing)       Start:  04/30/25    Expected End:  05/21/25             Pt will demo full fist (Progressing)       Start:  04/30/25    Expected End:  06/11/25                PALLAVI Alva

## 2025-05-12 ENCOUNTER — CLINICAL SUPPORT (OUTPATIENT)
Dept: REHABILITATION | Facility: HOSPITAL | Age: 35
End: 2025-05-12
Payer: OTHER GOVERNMENT

## 2025-05-12 DIAGNOSIS — M79.644 PAIN OF FINGER OF RIGHT HAND: ICD-10-CM

## 2025-05-12 DIAGNOSIS — M79.641 PAIN IN RIGHT HAND: Primary | ICD-10-CM

## 2025-05-12 PROCEDURE — 97022 WHIRLPOOL THERAPY: CPT | Mod: CO

## 2025-05-12 PROCEDURE — 97530 THERAPEUTIC ACTIVITIES: CPT | Mod: CO

## 2025-05-19 ENCOUNTER — DOCUMENTATION ONLY (OUTPATIENT)
Dept: REHABILITATION | Facility: HOSPITAL | Age: 35
End: 2025-05-19
Payer: MEDICAID

## 2025-05-19 NOTE — PROGRESS NOTES
No Show Note/Documentation    Patient: Javier Anderson  Date of Session: 5/19/2025  Diagnosis: No diagnosis found.  MRN: [id number]    Javier Anderson did not attend his/her scheduled therapy appointment today. She did not call to cancel nor reschedule.  No charges have been posted today.       PALLAVI Alva  5/19/2025

## 2025-05-26 ENCOUNTER — CLINICAL SUPPORT (OUTPATIENT)
Dept: REHABILITATION | Facility: HOSPITAL | Age: 35
End: 2025-05-26
Payer: OTHER GOVERNMENT

## 2025-05-26 DIAGNOSIS — M79.641 PAIN IN RIGHT HAND: Primary | ICD-10-CM

## 2025-05-26 PROCEDURE — 97018 PARAFFIN BATH THERAPY: CPT

## 2025-05-26 PROCEDURE — 97530 THERAPEUTIC ACTIVITIES: CPT

## 2025-05-26 NOTE — PROGRESS NOTES
"  Outpatient Rehab    Occupational Therapy Progress Note    Patient Name: Javier Anderson  MRN: [id number]  YOB: 1990  Encounter Date: 5/26/2025    Therapy Diagnosis:   Encounter Diagnosis   Name Primary?    Pain in right hand Yes     Physician: Ruel Johnson MD    Physician Orders: Eval and Treat  Medical Diagnosis: Right hand weakness    Visit # / Visits Authorized: 4 / 12  Insurance Authorization Period: 4/24/2025 to 4/24/2026  Date of Evaluation: 4/29/2025  Plan of Care Certification: 4/30/2025 to 6/11/2025      Time In: 0937   Time Out: 1036  Total Time (in minutes): 59   Total Billable Time (in minutes): 55    FOTO:  Intake Score: 34%  Survey Score 2:  %  Survey Score 3:  %    Precautions:       Subjective   Reports that her hand "gives out" sometimes. "Loses feeling" and things "fall out" sometimes. "It has its moments..    7.5    Objective            Treatment:  Therapeutic Activity  TA 2: octy x 3 min  TA 3: red flexbar wf/we/rd/ud twists, frowns, smiles x 20 ea  TA 4: red power web pushes and pulls x20  TA 5: BTE: 504 wrist ext, wrist flex, 302 wrist rd, wrist ud, 162 lat pinch, 162   45 to 60 sec each with mild resistance  TA 6: true balance x 3 min  TA 8: in hand manip with coins  TA 9: liberty stick x 3 min with 1.1 kg ball    Time Entry(in minutes):  Paraffin Bath Time Entry: 10  Therapeutic Activity Time Entry: 45    Assessment & Plan   Assessment: Advanced to BTE. Javier displayed abnormal movement patterns and co-contrating of opposing muscle at times.  Javier continues to report high level of pain:: 7.5/10. Reports that her hand "gives out". "goes numb" and "drope things" at times. Unclease etiology of this.       The patient will continue to benefit from skilled outpatient occupational therapy in order to address the deficits listed in the problem list on the initial evaluation, provide patient and family education, and maximize the patients level of independence in the home and " community environments.     The patient's spiritual, cultural, and educational needs were considered, and the patient is agreeable to the plan of care and goals.           Plan: cont to address OT goals as tolerated    Goals:   Active       Long Term Goals       Pt will report a pain level of 2 out of 10 with ADLs/IADLs  (Progressing)       Start:  04/30/25    Expected End:  06/11/25             Pt will demo improved FOTO score by 40 points.   (Progressing)       Start:  04/30/25    Expected End:  06/11/25             Pt will return to prior level of function for ADLs /IADLs  (Progressing)       Start:  04/30/25    Expected End:  06/11/25               Short Term Goals        Pt will report a pain level of 2 out of 10 with use in light ADLs.  (Progressing)       Start:  04/30/25    Expected End:  05/21/25            Pt will demo improved FOTO score by 20 points.  (Progressing)       Start:  04/30/25    Expected End:  05/21/25            Pt will reports independence in light ADL's with use of the involved Hand/wrist/UE  (Progressing)       Start:  04/30/25    Expected End:  05/21/25             Pt will demo full fist (Progressing)       Start:  04/30/25    Expected End:  06/11/25                Linda Castaneda OT

## 2025-06-01 ENCOUNTER — HOSPITAL ENCOUNTER (EMERGENCY)
Facility: HOSPITAL | Age: 35
Discharge: HOME | End: 2025-06-01
Attending: EMERGENCY MEDICINE
Payer: MEDICAID

## 2025-06-01 VITALS
SYSTOLIC BLOOD PRESSURE: 120 MMHG | TEMPERATURE: 98 F | HEART RATE: 74 BPM | BODY MASS INDEX: 32.13 KG/M2 | HEIGHT: 68 IN | DIASTOLIC BLOOD PRESSURE: 79 MMHG | RESPIRATION RATE: 19 BRPM | WEIGHT: 212 LBS | OXYGEN SATURATION: 100 %

## 2025-06-01 DIAGNOSIS — M62.838 NECK MUSCLE SPASM: Primary | ICD-10-CM

## 2025-06-01 LAB
B-HCG UR QL: NEGATIVE
CTP QC/QA: YES

## 2025-06-01 PROCEDURE — 81025 URINE PREGNANCY TEST: CPT | Mod: ER | Performed by: EMERGENCY MEDICINE

## 2025-06-01 PROCEDURE — 81025 URINE PREGNANCY TEST: CPT | Mod: ER

## 2025-06-01 PROCEDURE — 99284 EMERGENCY DEPT VISIT MOD MDM: CPT | Mod: 25,ER

## 2025-06-01 PROCEDURE — 63600175 PHARM REV CODE 636 W HCPCS: Mod: ER | Performed by: EMERGENCY MEDICINE

## 2025-06-01 PROCEDURE — 96372 THER/PROPH/DIAG INJ SC/IM: CPT | Performed by: EMERGENCY MEDICINE

## 2025-06-01 PROCEDURE — 25000003 PHARM REV CODE 250: Mod: ER | Performed by: EMERGENCY MEDICINE

## 2025-06-01 RX ORDER — DEXAMETHASONE SODIUM PHOSPHATE 4 MG/ML
8 INJECTION, SOLUTION INTRA-ARTICULAR; INTRALESIONAL; INTRAMUSCULAR; INTRAVENOUS; SOFT TISSUE
Status: COMPLETED | OUTPATIENT
Start: 2025-06-01 | End: 2025-06-01

## 2025-06-01 RX ORDER — LIDOCAINE 50 MG/G
1 PATCH TOPICAL
Status: DISCONTINUED | OUTPATIENT
Start: 2025-06-01 | End: 2025-06-02 | Stop reason: HOSPADM

## 2025-06-01 RX ORDER — HYDROXYZINE PAMOATE 25 MG/1
25 CAPSULE ORAL
Status: COMPLETED | OUTPATIENT
Start: 2025-06-01 | End: 2025-06-01

## 2025-06-01 RX ORDER — LIDOCAINE 50 MG/G
1 PATCH TOPICAL DAILY
Qty: 10 PATCH | Refills: 0 | Status: SHIPPED | OUTPATIENT
Start: 2025-06-01

## 2025-06-01 RX ORDER — KETOROLAC TROMETHAMINE 30 MG/ML
15 INJECTION, SOLUTION INTRAMUSCULAR; INTRAVENOUS
Status: COMPLETED | OUTPATIENT
Start: 2025-06-01 | End: 2025-06-01

## 2025-06-01 RX ORDER — DICLOFENAC SODIUM 75 MG/1
75 TABLET, DELAYED RELEASE ORAL 2 TIMES DAILY
Qty: 60 TABLET | Refills: 0 | Status: SHIPPED | OUTPATIENT
Start: 2025-06-01

## 2025-06-01 RX ORDER — DICLOFENAC SODIUM 75 MG/1
75 TABLET, DELAYED RELEASE ORAL 2 TIMES DAILY
Qty: 60 TABLET | Refills: 0 | Status: SHIPPED | OUTPATIENT
Start: 2025-06-01 | End: 2025-06-01

## 2025-06-01 RX ORDER — LIDOCAINE 50 MG/G
1 PATCH TOPICAL DAILY
Qty: 10 PATCH | Refills: 0 | Status: SHIPPED | OUTPATIENT
Start: 2025-06-01 | End: 2025-06-01

## 2025-06-01 RX ADMIN — LIDOCAINE 1 PATCH: 50 PATCH TOPICAL at 11:06

## 2025-06-01 RX ADMIN — DEXAMETHASONE SODIUM PHOSPHATE 8 MG: 4 INJECTION INTRA-ARTICULAR; INTRALESIONAL; INTRAMUSCULAR; INTRAVENOUS; SOFT TISSUE at 11:06

## 2025-06-01 RX ADMIN — HYDROXYZINE PAMOATE 25 MG: 25 CAPSULE ORAL at 11:06

## 2025-06-01 RX ADMIN — KETOROLAC TROMETHAMINE 15 MG: 30 INJECTION, SOLUTION INTRAMUSCULAR; INTRAVENOUS at 11:06

## 2025-06-02 NOTE — ED PROVIDER NOTES
Encounter Date: 6/1/2025       History     Chief Complaint   Patient presents with    Neck Pain     Endorses onset of muscle spasms to right neck which started Tuesday, which has now progressed to bilat. Neck pain with limited ROM at this time. Endorses heating pad use but no med use PTA.     HPI  35 y.o.   Co bilateral neck spasms atraumatic x 2 days  No hand sx  Able to walk  No headache  No h/o this  Tried heating pad  Worse with movement  Mod  Nr    Review of patient's allergies indicates:  No Known Allergies  Past Medical History:   Diagnosis Date    COVID-19      Past Surgical History:   Procedure Laterality Date    DILATION AND CURETTAGE OF UTERUS      EYE SURGERY      FINGER AMPUTATION Right 2/23/2024    Procedure: AMPUTATION, FINGER - right long finger through P2;  Surgeon: Ruel Johnson MD;  Location: [facility] OR;  Service: Orthopedics;  Laterality: Right;    FINGER AMPUTATION Right 4/17/2024    Procedure: PARTIAL AMPUTATION, FINGER - right long finger;  Surgeon: Ruel Johnson MD;  Location: [facility] OR;  Service: Orthopedics;  Laterality: Right;    FINGER TENDON REPAIR Right 6/23/2023    Procedure: REPAIR, TENDON, FINGER - Right Long finger I&D, poss tendon repair, poss skin graft vs integra;  Surgeon: Ruel Johnson MD;  Location: [facility] OR;  Service: Orthopedics;  Laterality: Right;     Family History   Problem Relation Name Age of Onset    Diabetes Mother      Diabetes Maternal Grandmother      Diabetes Paternal Grandmother       Social History[1]  Review of Systems  All systems were reviewed/examined and were negative except as noted in the HPI.    Physical Exam     Initial Vitals [06/01/25 2130]   BP Pulse Resp Temp SpO2   120/79 74 19 97.8 °F (36.6 °C) 100 %      MAP       --         Physical Exam    General: the patient is awake, alert, and in no apparent distress.  Head: normocephalic and atraumatic, sclera are clear  Neck: supple without meningismus    Nt midline  Chest: no respiratory  distress  Heart: regular rate and rhythm  Hands nvi m/u/r n  Extremities: warm and well perfused  Skin: warm and dry  Psych conversant  Neuro: awake, alert, moving all extremities    ED Course   Procedures  Labs Reviewed   POCT URINE PREGNANCY       Result Value    POC Preg Test, Ur Negative       Acceptable Yes            Imaging Results    None          Medications   ketorolac injection 15 mg (15 mg Intramuscular Given 6/1/25 2330)   hydrOXYzine pamoate capsule 25 mg (25 mg Oral Given 6/1/25 2329)   dexAMETHasone injection 8 mg (8 mg Intramuscular Given 6/1/25 2329)     Medical Decision Making  Amount and/or Complexity of Data Reviewed  Labs: ordered.    Risk  Prescription drug management.                  Medical Decision Making:    This is an emergent evaluation of a patient presenting to the ED.  Nursing notes were reviewed.  Hcg neg  I personally reviewed and interpreted the laboratory results.    I decided to obtain and review old medical records, which showed: outpatient care    Evaluation for Emergency Medical Condition  The patient received a medical screening exam and within a reasonable degree of clinical confidence an emergency medical condition has not been identified.  The patient is instructed on proper follow up and return precautions to the ED.      Frederic Alegria MD, CHAS                        Clinical Impression:  Final diagnoses:  [M62.838] Neck muscle spasm (Primary)          ED Disposition Condition    Discharge Stable          ED Prescriptions       Medication Sig Dispense Start Date End Date Auth. Provider    LIDOcaine (LIDODERM) 5 %  (Status: Discontinued) Place 1 patch onto the skin once daily. Remove & Discard patch within 12 hours or as directed by MD 10 patch 6/1/2025 6/1/2025 Jacques Alegria MD    diclofenac (VOLTAREN) 75 MG EC tablet  (Status: Discontinued) Take 1 tablet (75 mg total) by mouth 2 (two) times daily. 60 tablet 6/1/2025 6/1/2025 Jacques Alegria MD     diclofenac (VOLTAREN) 75 MG EC tablet Take 1 tablet (75 mg total) by mouth 2 (two) times daily. 60 tablet 6/1/2025 -- Jacques Alegria MD    LIDOcaine (LIDODERM) 5 % Place 1 patch onto the skin once daily. Remove & Discard patch within 12 hours or as directed by MD 10 patch 6/1/2025 -- Jacques Alegria MD          Follow-up Information       Follow up With Specialties Details Why Contact [facility]    [facility] -  Schedule an appointment as soon as possible for a visit   [address] [place] [address]  [phone]            Discharged to home in stable condition, return to ED warnings given, follow up and patient care instructions given.      Frederic Alegria MD, CHAS, FACEP  Department of Emergency Medicine           [1]   Social History  Tobacco Use    Smoking status: Never    Smokeless tobacco: Never   Substance Use Topics    Alcohol use: Yes     Comment: less than one drink per week    Drug use: No        Jacques Alegria MD  06/04/25 0555

## 2025-06-10 ENCOUNTER — CLINICAL SUPPORT (OUTPATIENT)
Dept: REHABILITATION | Facility: HOSPITAL | Age: 35
End: 2025-06-10
Payer: OTHER GOVERNMENT

## 2025-06-10 DIAGNOSIS — M79.641 PAIN IN RIGHT HAND: ICD-10-CM

## 2025-06-10 DIAGNOSIS — T14.90XD HEALING WOUND: ICD-10-CM

## 2025-06-10 DIAGNOSIS — M79.644 PAIN OF FINGER OF RIGHT HAND: Primary | ICD-10-CM

## 2025-06-10 DIAGNOSIS — M25.60 RANGE OF MOTION DEFICIT: ICD-10-CM

## 2025-06-10 PROCEDURE — 97022 WHIRLPOOL THERAPY: CPT

## 2025-06-10 PROCEDURE — 97112 NEUROMUSCULAR REEDUCATION: CPT

## 2025-06-11 ENCOUNTER — CLINICAL SUPPORT (OUTPATIENT)
Dept: REHABILITATION | Facility: HOSPITAL | Age: 35
End: 2025-06-11
Payer: OTHER GOVERNMENT

## 2025-06-11 DIAGNOSIS — M79.641 PAIN IN RIGHT HAND: Primary | ICD-10-CM

## 2025-06-11 PROCEDURE — 97530 THERAPEUTIC ACTIVITIES: CPT

## 2025-06-11 PROCEDURE — 97022 WHIRLPOOL THERAPY: CPT

## 2025-06-11 NOTE — PROGRESS NOTES
"  Outpatient Rehab    Occupational Therapy Progress Note : Updated Plan of Care    Patient Name: Javier Anderson  MRN: [id number]  YOB: 1990  Encounter Date: 6/11/2025    Therapy Diagnosis:   Encounter Diagnosis   Name Primary?    Pain in right hand Yes     Physician: Ruel Johnson MD    Physician Orders: Eval and Treat  Medical Diagnosis: Right hand weakness  Surgical Diagnosis: See sx hx  below   Surgical Date: Not applicable for this Episode    Visit # / Visits Authorized: 8 / 12  Insurance Authorization Period: 4/24/2025 to 4/24/2026  Date of Evaluation: 4/29/2025   Plan of Care Certification: 4/30/2025 to 6/11/2025      Time In: 1038   Time Out: 1120  Total Time (in minutes): 42   Total Billable Time (in minutes): 42    FOTO:  Intake Score:  %  Survey Score 2:  %  Survey Score 3:  %    Precautions:       Subjective   C/o tingling in the entire right hand at times."It just comes." Reports that she is dropping things randomly..    7.5    Objective      Digit 2 - Index Finger Active Range of Motion  Right Index Finger   Extension (deg) Flexion (deg) Pain   MCP 0 84     PIP 0 98     DIP 0 25     CRUZ: 207 (+8)      Digit 3 - Middle Finger Active Range of Motion  Right Middle Finger   Extension (deg) Flexion (deg) Pain   MCP 0 97     PIP 0 52 (-4)     DIP         CRUZ:        Digit 4 - Ring Finger Active Range of Motion  Right Ring Finger   Extension (deg) Flexion (deg) Pain   MCP 0 87     PIP 0 105     DIP 0 52     CRUZ: 244      Digit 5 - Little Finger Active Range of Motion  Right Little Finger   Extension (deg) Flexion (deg) Pain   MCP 0 88     PIP 0 98     DIP 0 48     CRUZ: 234                        Five-Position  Strength Measure   Setting 1 (lbs) Setting 2 (lbs) Setting 3 (lbs) Setting 4 (lbs) Setting 5 (lbs)   Right 6 9 9 10 5   Left                   /Pinch Details  5-Rung plot reveals abnormal curve progression. Abnormal movement patterns noted during  assessment with patient " shaking the dynamometer at times. Avg rung 3= 3.6 which is lower than that achieved on rung 3 of the 5-rung test.            Treatment:  Therapeutic Activity  TA 1: Reassessment of AROM,  strength, pain,  status  TA 2: BTE: 504 wrist ext, wrist flex, 302 wrist rd, wrist ud, 162 lat pinch, 162  45 to 60 sec each with mild resistance  Modalities  Fluidotherapy: Fluidotherapy: To involved hand for 10 min, continuous air, 115 deg, air speed 50 to decrease pain, edema & scar tissue, sensory re- education, and increased tissue extensibility prior to therex    Time Entry(in minutes):  Whirlpool Time Entry: 10  Therapeutic Activity Time Entry: 32    Assessment & Plan       Javier shows some increase in arom of the fingers, however, strength has regressed. 5-Rung plot reveals abnormal curve progression. Abnormal movement patterns noted during  assessment with patient shaking the dynamometer at times. Avg rung 3= 3.6 which is lower than that achieved on rung 3 of the 5-rung test.     The patient will possibly benefit from skilled outpatient occupational therapy in order to address the deficits listed in the problem list on the initial evaluation, provide patient and family education, and maximize the patients level of independence in the home and community environments. Consult with physicina.     The patient's spiritual, cultural, and educational needs were considered, and the patient is agreeable to the plan of care and goals.          Goals:   Active       Long Term Goals       Pt will report a pain level of 2 out of 10 with ADLs/IADLs  (Progressing)       Start:  04/30/25    Expected End:  07/15/25             Pt will demo improved FOTO score by 40 points.   (Progressing)       Start:  04/30/25    Expected End:  07/15/25             Pt will return to prior level of function for ADLs /IADLs  (Progressing)       Start:  04/30/25    Expected End:  07/15/25               Short Term Goals        Pt will report a  pain level of 2 out of 10 with use in light ADLs.  (Progressing)       Start:  04/30/25    Expected End:  07/15/25            Pt will demo improved FOTO score by 20 points.  (Progressing)       Start:  04/30/25    Expected End:  07/15/25            Pt will reports independence in light ADL's with use of the involved Hand/wrist/UE  (Progressing)       Start:  04/30/25    Expected End:  07/15/25             Pt will demo full fist (Progressing)       Start:  04/30/25    Expected End:  07/15/25                Linda Castaneda OT

## 2025-06-11 NOTE — PROGRESS NOTES
Outpatient Rehab    Occupational Therapy Visit    Patient Name: Javier Anderson  MRN: [id number]  YOB: 1990  Encounter Date: 6/10/2025    Therapy Diagnosis:   Encounter Diagnoses   Name Primary?    Pain of finger of right hand Yes    Healing wound     Range of motion deficit     Pain in right hand      Physician: Ruel Johnson MD    Physician Orders: Eval and Treat  Medical Diagnosis: Right hand weakness  Surgical Diagnosis: See sx hx  below   Surgical Date: Not applicable for this Episode    Visit # / Visits Authorized: 6 / 12  Insurance Authorization Period: 4/24/2025 to 4/24/2026  Date of Evaluation: 4/29/2025  Plan of Care Certification: 4/30/2025 to 6/11/2025      Time In: 0945   Time Out: 1030  Total Time (in minutes): 45   Total Billable Time (in minutes):      FOTO:  Intake Score:  %  Survey Score 2:  %  Survey Score 3:  %    Precautions:       Subjective      Pain reported as 7/10.      Objective            Treatment:  Balance/Neuromuscular Re-Education  NMR 1: in hand manipulation with coins (using LF with thumb) 2 sets  NMR 2: liberty stick yellow ball holding green (5 laps)  NMR 3: BTE (wrist, FA, , pinch)  Modalities  Fluidotherapy: Fluidotherapy: To involved hand for 10 min, continuous air, 115 deg, air speed 50 to decrease pain, edema & scar tissue, sensory re- education, and increased tissue extensibility prior to therex    Time Entry(in minutes):       Assessment & Plan   Assessment:    Evaluation/Treatment Tolerance: Patient tolerated treatment well    The patient will continue to benefit from skilled outpatient occupational therapy in order to address the deficits listed in the problem list on the initial evaluation, provide patient and family education, and maximize the patients level of independence in the home and community environments.     The patient's spiritual, cultural, and educational needs were considered, and the patient is agreeable to the plan of care and goals.            Plan:      Goals:   Active       Long Term Goals       Pt will report a pain level of 2 out of 10 with ADLs/IADLs  (Not Progressing)       Start:  04/30/25    Expected End:  06/11/25             Pt will demo improved FOTO score by 40 points.   (Not Progressing)       Start:  04/30/25    Expected End:  06/11/25             Pt will return to prior level of function for ADLs /IADLs  (Not Progressing)       Start:  04/30/25    Expected End:  06/11/25               Short Term Goals        Pt will report a pain level of 2 out of 10 with use in light ADLs.  (Not Progressing)       Start:  04/30/25    Expected End:  05/21/25            Pt will demo improved FOTO score by 20 points.  (Not Progressing)       Start:  04/30/25    Expected End:  05/21/25            Pt will reports independence in light ADL's with use of the involved Hand/wrist/UE  (Not Progressing)       Start:  04/30/25    Expected End:  05/21/25             Pt will demo full fist (Not Progressing)       Start:  04/30/25    Expected End:  06/11/25                Camilla Covarrubias OT

## 2025-06-16 ENCOUNTER — CLINICAL SUPPORT (OUTPATIENT)
Dept: REHABILITATION | Facility: HOSPITAL | Age: 35
End: 2025-06-16
Payer: OTHER GOVERNMENT

## 2025-06-16 DIAGNOSIS — M79.641 PAIN IN RIGHT HAND: Primary | ICD-10-CM

## 2025-06-16 PROCEDURE — 97022 WHIRLPOOL THERAPY: CPT | Mod: CO

## 2025-06-16 PROCEDURE — 97530 THERAPEUTIC ACTIVITIES: CPT | Mod: CO

## 2025-06-16 NOTE — PROGRESS NOTES
Outpatient Rehab    Occupational Therapy Visit    Patient Name: Javier Anderson  MRN: [id number]  YOB: 1990  Encounter Date: 6/16/2025    Therapy Diagnosis:   Encounter Diagnosis   Name Primary?    Pain in right hand Yes     Physician: Ruel Johnson MD    Physician Orders: Eval and Treat  Medical Diagnosis: Right hand weakness  Surgical Diagnosis: See sx hx  below   Surgical Date: Not applicable for this Episode  Days Since Last Surgery: Not applicable for this Episode    Visit # / Visits Authorized: 7 / 12  Insurance Authorization Period: 4/24/2025 to 4/24/2026  Date of Evaluation: 4/29/2025  Plan of Care Certification: 4/30/2025 to 6/11/2025      Time In: 0943   Time Out: 1027  Total Time (in minutes): 44   Total Billable Time (in minutes):      FOTO:  Intake Score:  %  Survey Score 2:  %  Survey Score 3:  %    Precautions:       Subjective   pt reports still has decr strength, unable to give specific examples.  Pain reported as 0/10.      Objective            Treatment:  Therapeutic Activity  TA 2: BTE: 504 wrist ext, wrist flex, 302 wrist rd, wrist ud, 162  45 to 60 sec each with mild resistance  TA 3: red flexbar wf/we/rd/ud twists, frowns, smiles x 20 ea  TA 4: red power web pushes and pulls x20  TA 5: wrist PRE with tan ball (1.1 lb) 3 x 10  TA 9: liberty stick x 3 min with 1.1 kg ball  TA 10: in hand manip with small pegs, remove from board with PHG silver first rung, 25#  Modalities  Fluidotherapy: Fluidotherapy: To involved hand for 10 min, continuous air, 115 deg, air speed 50 to decrease pain, edema & scar tissue, sensory re- education, and increased tissue extensibility prior to therex    Time Entry(in minutes):  Whirlpool Time Entry: 10  Therapeutic Activity Time Entry: 34    Assessment & Plan   Assessment: Pt tolerated session with minimal rest breaks. Last session pt  strength avg measured at about 4#, however today was able to tolerate PHG gripping activity with 25# PHG and  only one rest break. Pt with little attn to task today and not engaged in therapy.   Evaluation/Treatment Tolerance: Patient tolerated treatment well    The patient will continue to benefit from skilled outpatient occupational therapy in order to address the deficits listed in the problem list on the initial evaluation, provide patient and family education, and maximize the patients level of independence in the home and community environments.     The patient's spiritual, cultural, and educational needs were considered, and the patient is agreeable to the plan of care and goals.           Plan: cont to address OT goals as tolerated    Goals:   Active       Long Term Goals       Pt will report a pain level of 2 out of 10 with ADLs/IADLs  (Progressing)       Start:  04/30/25    Expected End:  06/11/25             Pt will demo improved FOTO score by 40 points.   (Progressing)       Start:  04/30/25    Expected End:  06/11/25             Pt will return to prior level of function for ADLs /IADLs  (Progressing)       Start:  04/30/25    Expected End:  06/11/25               Short Term Goals        Pt will report a pain level of 2 out of 10 with use in light ADLs.  (Progressing)       Start:  04/30/25    Expected End:  05/21/25            Pt will demo improved FOTO score by 20 points.  (Progressing)       Start:  04/30/25    Expected End:  05/21/25            Pt will reports independence in light ADL's with use of the involved Hand/wrist/UE  (Progressing)       Start:  04/30/25    Expected End:  05/21/25             Pt will demo full fist (Progressing)       Start:  04/30/25    Expected End:  06/11/25                PALLAVI Alva    Supervising OT, Camilla Covarrubias, was readily available to answer questions about the client's intervention at the time of the provision of services

## 2025-06-23 ENCOUNTER — CLINICAL SUPPORT (OUTPATIENT)
Dept: REHABILITATION | Facility: HOSPITAL | Age: 35
End: 2025-06-23
Payer: OTHER GOVERNMENT

## 2025-06-23 DIAGNOSIS — R29.898 RIGHT HAND WEAKNESS: ICD-10-CM

## 2025-06-23 DIAGNOSIS — M79.641 PAIN IN RIGHT HAND: Primary | ICD-10-CM

## 2025-06-23 PROCEDURE — 97022 WHIRLPOOL THERAPY: CPT | Mod: CO

## 2025-06-23 PROCEDURE — 97530 THERAPEUTIC ACTIVITIES: CPT | Mod: CO

## 2025-06-23 NOTE — PROGRESS NOTES
Outpatient Rehab    Occupational Therapy Visit    Patient Name: Javier Anderson  MRN: [id number]  YOB: 1990  Encounter Date: 6/23/2025    Therapy Diagnosis:   Encounter Diagnoses   Name Primary?    Right hand weakness     Pain in right hand Yes     Physician: Ruel Johnson MD    Physician Orders: Eval and Treat  Medical Diagnosis: Right hand weakness  Right hand weakness  Surgical Diagnosis: See sx hx  below   Surgical Date: Not applicable for this Episode  Days Since Last Surgery: Not applicable for this Episode    Visit # / Visits Authorized: 8 / 12  Insurance Authorization Period: 4/24/2025 to 4/24/2026  Date of Evaluation: 4/29/2025  Plan of Care Certification: 4/30/2025 to 6/11/2025      Time In: 1115   Time Out: 1200  Total Time (in minutes): 45   Total Billable Time (in minutes): 37    FOTO:  Intake Score:  %  Survey Score 2:  %  Survey Score 3:  %    Precautions:       Subjective   Hit her hand on a fan, is swollen and painful.  Pain reported as 8/10.      Objective            Treatment:  Therapeutic Activity  TA 1: towel walks x 3 min  TA 3: red flexbar wf/we/rd/ud twists, frowns, smiles x 20 ea  TA 4: red power web pushes - stopped due to pain  TA 7: isospheres x 3 min  TA 8: in hand manip with coins x 2 sets  Modalities  Cryotherapy (Minutes\Location): Pt recieved icepack X8 minutes post treatment  Fluidotherapy: Fluidotherapy: To involved hand for 10 min, continuous air, 115 deg, air speed 50 to decrease pain, edema & scar tissue, sensory re- education, and increased tissue extensibility prior to therex    Time Entry(in minutes):  [employer] Time Entry: 10  Therapeutic Activity Time Entry: 27    Assessment & Plan   Assessment: Pt with report of incr pain today due to hitting her hand on a fan. Focused on light activities today. Ed on use of rest and ice, if pain does not resolve will contact the doctor.  Evaluation/Treatment Tolerance: Patient limited by pain    The patient will continue to  benefit from skilled outpatient occupational therapy in order to address the deficits listed in the problem list on the initial evaluation, provide patient and family education, and maximize the patients level of independence in the home and community environments.     The patient's spiritual, cultural, and educational needs were considered, and the patient is agreeable to the plan of care and goals.           Plan: cont to address OT goals as tolerated    Goals:   Active       Long Term Goals       Pt will report a pain level of 2 out of 10 with ADLs/IADLs  (Progressing)       Start:  04/30/25    Expected End:  06/11/25             Pt will demo improved FOTO score by 40 points.   (Progressing)       Start:  04/30/25    Expected End:  06/11/25             Pt will return to prior level of function for ADLs /IADLs  (Progressing)       Start:  04/30/25    Expected End:  06/11/25               Short Term Goals        Pt will report a pain level of 2 out of 10 with use in light ADLs.  (Progressing)       Start:  04/30/25    Expected End:  05/21/25            Pt will demo improved FOTO score by 20 points.  (Progressing)       Start:  04/30/25    Expected End:  05/21/25            Pt will reports independence in light ADL's with use of the involved Hand/wrist/UE  (Progressing)       Start:  04/30/25    Expected End:  05/21/25             Pt will demo full fist (Progressing)       Start:  04/30/25    Expected End:  06/11/25                PALLAVI Alva  Supervising OT, Linda Castaneda, was readily available to answer questions about the client's intervention at the time of the provision of services

## 2025-06-24 DIAGNOSIS — M79.641 RIGHT HAND PAIN: Primary | ICD-10-CM

## (undated) DEVICE — PAD CAST SPECIALIST STRL 4

## (undated) DEVICE — BANDAGE MATRIX HK LOOP 2IN 5YD

## (undated) DEVICE — DRAPE C-ARM MINI DISP

## (undated) DEVICE — DRESSING XEROFORM NONADH 1X8IN

## (undated) DEVICE — SLING ARM MEDIUM FOAM STRAP

## (undated) DEVICE — BANDAGE MATRIX HK LOOP 4IN 5YD

## (undated) DEVICE — COVER LIGHT HANDLE 80/CA

## (undated) DEVICE — TOURNIQUET SB QC DP 18X4IN

## (undated) DEVICE — CONTAINER SPECIMEN OR STER 4OZ

## (undated) DEVICE — IRRIGATION SET Y-TYPE TUR/BLAD

## (undated) DEVICE — STOCKINETTE DBL PLY ST 4X

## (undated) DEVICE — SUT VICRYL 4-0 RB1 27IN UD

## (undated) DEVICE — GLOVE BIOGEL SKINSENSE PI 7.5

## (undated) DEVICE — COVER CAMERA OPERATING ROOM

## (undated) DEVICE — GAUZE SPONGE 4X4 12PLY

## (undated) DEVICE — DRAPE STERI-DRAPE 1000 17X11IN

## (undated) DEVICE — UNDERPAD ULTRASORB 300LB 30X36

## (undated) DEVICE — Device

## (undated) DEVICE — DRAPE U SPLIT SHEET 54X76IN

## (undated) DEVICE — BANDAGE ESMARK ELASTIC ST 4X9

## (undated) DEVICE — PADDING CAST SYN STRL 2INX4YD

## (undated) DEVICE — SUT ETHILON 3-0 PS2 18 BLK

## (undated) DEVICE — GLOVE BIOGEL SKINSENSE PI 7.0

## (undated) DEVICE — SOL IRR SOD CHL .9% POUR

## (undated) DEVICE — SPLINT PLASTER EXT FAST 4X15

## (undated) DEVICE — SLING ARM LARGE FOAM STRAP

## (undated) DEVICE — SPONGE COTTON TRAY 4X4IN

## (undated) DEVICE — SUT 2-0 12-18IN SILK

## (undated) DEVICE — SUT 5-0 CHROMIC GUT / P-3

## (undated) DEVICE — SUT ETHILON 4-0 PS2 18 BLK

## (undated) DEVICE — UNDERGLOVES BIOGEL PI SIZE 8

## (undated) DEVICE — TOURNI-COT LARGE

## (undated) DEVICE — SWAB AEROBIC CULTURETTE

## (undated) DEVICE — SET BASIN 48X48IN 6000ML RING